# Patient Record
Sex: FEMALE | Race: WHITE | Employment: OTHER | URBAN - METROPOLITAN AREA
[De-identification: names, ages, dates, MRNs, and addresses within clinical notes are randomized per-mention and may not be internally consistent; named-entity substitution may affect disease eponyms.]

---

## 2018-09-11 ENCOUNTER — OFFICE VISIT (OUTPATIENT)
Dept: FAMILY MEDICINE CLINIC | Age: 68
End: 2018-09-11

## 2018-09-11 VITALS
BODY MASS INDEX: 28.94 KG/M2 | HEART RATE: 66 BPM | DIASTOLIC BLOOD PRESSURE: 84 MMHG | SYSTOLIC BLOOD PRESSURE: 128 MMHG | HEIGHT: 61 IN | WEIGHT: 153.3 LBS | TEMPERATURE: 98.4 F | RESPIRATION RATE: 18 BRPM | OXYGEN SATURATION: 96 %

## 2018-09-11 DIAGNOSIS — G47.00 INSOMNIA, UNSPECIFIED TYPE: Primary | ICD-10-CM

## 2018-09-11 RX ORDER — ZOLPIDEM TARTRATE 10 MG/1
10 TABLET ORAL
Qty: 30 TAB | Refills: 2 | Status: SHIPPED | OUTPATIENT
Start: 2018-09-11 | End: 2018-09-11 | Stop reason: SDUPTHER

## 2018-09-11 RX ORDER — ZOLPIDEM TARTRATE 10 MG/1
10 TABLET ORAL
Qty: 30 TAB | Refills: 2 | Status: SHIPPED | OUTPATIENT
Start: 2018-09-11 | End: 2018-09-11 | Stop reason: ALTCHOICE

## 2018-09-11 RX ORDER — TOPIRAMATE 200 MG/1
TABLET ORAL
COMMUNITY
Start: 2017-09-14 | End: 2018-09-28

## 2018-09-11 RX ORDER — RIZATRIPTAN BENZOATE 5 MG/1
TABLET, ORALLY DISINTEGRATING ORAL
COMMUNITY
Start: 2018-06-15 | End: 2019-06-05 | Stop reason: ALTCHOICE

## 2018-09-11 RX ORDER — ZOLPIDEM TARTRATE 10 MG/1
TABLET ORAL
COMMUNITY
Start: 2018-03-10 | End: 2018-09-11 | Stop reason: SDUPTHER

## 2018-09-11 RX ORDER — ZOLPIDEM TARTRATE 10 MG/1
10 TABLET ORAL
Qty: 30 TAB | Refills: 2 | Status: SHIPPED | OUTPATIENT
Start: 2018-09-11 | End: 2018-12-06 | Stop reason: SDUPTHER

## 2018-09-11 NOTE — MR AVS SNAPSHOT
[address] 
[phone] Patient: Twyla Ibarra MRN: [id number] :1950 Visit Information Date & Time Provider Department Dept. Phone Encounter #  
 2018  9:00 AM Braydon Parmar MD [facility] 144 [phone] [id number] Follow-up Instructions Return in about 3 months (around 2018). Your Appointments 2018 11:20 AM  
New Patient with Lexx Cat MD  
[address] [facility] ([facility]) Appt Note: NP; ref heidy Chan for juan pablo; [employer]; per pt no ref req [address] [facility] 99 [phone] 9191 [facility] [id number] Upcoming Health Maintenance Date Due Hepatitis C Screening 1950 DTaP/Tdap/Td series (1 - Tdap) 3/6/1969 ZOSTER VACCINE AGE 60> 2008 FOBT Q 1 YEAR AGE 50-75 10/12/2012 GLAUCOMA SCREENING Q2Y 3/6/2013 Bone Densitometry (Dexa) Screening 3/6/2013 Pneumococcal 65+ High/Highest Risk (2 of 2 - PPSV23) 2015 BREAST CANCER SCRN MAMMOGRAM 6/15/2018 Influenza Age 5 to Adult 2018 MEDICARE YEARLY EXAM 2018 Allergies as of 2018  Review Complete On: 2018 By: Nancy Garcia LPN Severity Noted Reaction Type Reactions Macrodantin [Nitrofurantoin Macrocrystalline]  2012    Other (comments) Low WBC Nitrofurantoin  10/03/2017    Unable to Obtain Nsaids (Non-steroidal Anti-inflammatory Drug)  2014    Other (comments) GI bleed. Other Plant, Animal, Environmental  2014    Other (comments) Mold, dust, cats, dog allergies. Tolmetin  10/03/2017    Unknown (comments) Toradol [Ketorolac Tromethamine]  2012    Rash Current Immunizations  Reviewed on 6/10/2012 Name Date  Influenza Vaccine Split 10/14/2011  4:26 PM  
 Pneumococcal Conjugate (PCV-13) 10/12/2015 Not reviewed this visit You Were Diagnosed With   
  
 Codes Comments Insomnia, unspecified type    -  Primary ICD-10-CM: G47.00 ICD-9-CM: 780.52 Vitals BP Pulse Temp Resp Height(growth percentile) Weight(growth percentile) 128/84 (BP 1 Location: Left arm, BP Patient Position: Sitting) 66 98.4 °F (36.9 °C) (Oral) 18 5' 1\" (1.549 m) 153 lb 4.8 oz (69.5 kg) SpO2 BMI OB Status Smoking Status 96% 28.97 kg/m2 Hysterectomy Never Smoker Vitals History BMI and BSA Data Body Mass Index Body Surface Area  
 28.97 kg/m 2 1.73 m 2 Preferred Pharmacy Pharmacy Name Phone [address], [place] [address] AT [facility] OF  [facility] [phone] Your Updated Medication List  
  
   
This list is accurate as of 9/11/18 10:41 AM.  Always use your most recent med list.  
  
  
  
  
 bisacodyl 5 mg Tab Take 10 mg by mouth as needed. CO Q-10 200 mg capsule Generic drug:  coenzyme Q-10 Take 200 mg by mouth daily. ezetimibe 10 mg tablet Commonly known as:  Narciso Kathleen Take 1 Tab by mouth daily. FLAXSEED OIL PO Take 2,600 mg by mouth daily. gemfibrozil 600 mg tablet Commonly known as:  LOPID Take 600 mg by mouth two (2) times a day. krill oil 500 mg Cap Take 500 mg by mouth daily. metoprolol tartrate 25 mg tablet Commonly known as:  LOPRESSOR Take 25 mg by mouth daily. MULTIVITAMIN PO Take  by mouth. Takes one po daily PRILOSEC OTC PO Take  by mouth as needed. PROBIOTIC PO Take  by mouth. Takes one po daily. rizatriptan 5 mg rapid dissolve tablet Commonly known as:  MAXALT-MLT  
DIS 1 T ON THE TONGUE 1 TIME PRF MIGRAINE HA  1 T QD PRN MIGRAINE  
  
 topiramate 200 mg tablet Commonly known as:  TOPAMAX TK 1 T PO BID ULTRAM 50 mg tablet Generic drug:  traMADol Take 50 mg by mouth two (2) times a day. 2 times daily and as needed 4 hrs later. zolpidem 10 mg tablet Commonly known as:  AMBIEN Take 1 Tab by mouth nightly. Max Daily Amount: 10 mg.  
  
 ZyrTEC 10 mg tablet Generic drug:  cetirizine Take 10 mg by mouth daily. Prescriptions Printed Refills  
 zolpidem (AMBIEN) 10 mg tablet 2 Sig: Take 1 Tab by mouth nightly. Max Daily Amount: 10 mg.  
 Class: Print Route: Oral  
  
Follow-up Instructions Return in about 3 months (around 12/11/2018). Patient Instructions Insomnia: Care Instructions Your Care Instructions Insomnia is the inability to sleep well. It is a common problem for most people at some time. Insomnia may make it hard for you to get to sleep, stay asleep, or sleep as long as you need to. This can make you tired and grouchy during the day. It can also make you forgetful, less effective at work, and unhappy. Insomnia can be caused by conditions such as depression or anxiety. Pain can also affect your ability to sleep. When these problems are solved, the insomnia usually clears up. But sometimes bad sleep habits can cause insomnia. If insomnia is affecting your work or your enjoyment of life, you can take steps to improve your sleep. Follow-up care is a key part of your treatment and safety. Be sure to make and go to all appointments, and call your doctor if you are having problems. It's also a good idea to know your test results and keep a list of the medicines you take. How can you care for yourself at home? What to avoid · Do not have drinks with caffeine, such as coffee or black tea, for 8 hours before bed. · Do not smoke or use other types of tobacco near bedtime. Nicotine is a stimulant and can keep you awake. · Avoid drinking alcohol late in the evening, because it can cause you to wake in the middle of the night. · Do not eat a big meal close to bedtime. If you are hungry, eat a light snack. · Do not drink a lot of water close to bedtime, because the need to urinate may wake you up during the night. · Do not read or watch TV in bed. Use the bed only for sleeping and sexual activity. What to try · Go to bed at the same time every night, and wake up at the same time every morning. Do not take naps during the day. · Keep your bedroom quiet, dark, and cool. · Sleep on a comfortable pillow and mattress. · If watching the clock makes you anxious, turn it facing away from you so you cannot see the time. · If you worry when you lie down, start a worry book. Well before bedtime, write down your worries, and then set the book and your concerns aside. · Try meditation or other relaxation techniques before you go to bed. · If you cannot fall asleep, get up and go to another room until you feel sleepy. Do something relaxing. Repeat your bedtime routine before you go to bed again. · Make your house quiet and calm about an hour before bedtime. Turn down the lights, turn off the TV, log off the computer, and turn down the volume on music. This can help you relax after a busy day. When should you call for help? Watch closely for changes in your health, and be sure to contact your doctor if: 
  · Your efforts to improve your sleep do not work.  
  · Your insomnia gets worse.  
  · You have been feeling down, depressed, or hopeless or have lost interest in things that you usually enjoy. Where can you learn more? Go to http://jeniffer-rogelio.info/. Enter P513 in the search box to learn more about \"Insomnia: Care Instructions. \" Current as of: October 10, 2017 Content Version: 11.7 © 1338-1523 [employer]. Care instructions adapted under license by [employer] (which disclaims liability or warranty for this information).  If you have questions about a medical condition or this instruction, always ask your healthcare professional. [address] any warranty or liability for your use of this information. Insomnia: Care Instructions Your Care Instructions Insomnia is the inability to sleep well. It is a common problem for most people at some time. Insomnia may make it hard for you to get to sleep, stay asleep, or sleep as long as you need to. This can make you tired and grouchy during the day. It can also make you forgetful, less effective at work, and unhappy. Insomnia can be caused by conditions such as depression or anxiety. Pain can also affect your ability to sleep. When these problems are solved, the insomnia usually clears up. But sometimes bad sleep habits can cause insomnia. If insomnia is affecting your work or your enjoyment of life, you can take steps to improve your sleep. Follow-up care is a key part of your treatment and safety. Be sure to make and go to all appointments, and call your doctor if you are having problems. It's also a good idea to know your test results and keep a list of the medicines you take. How can you care for yourself at home? What to avoid · Do not have drinks with caffeine, such as coffee or black tea, for 8 hours before bed. · Do not smoke or use other types of tobacco near bedtime. Nicotine is a stimulant and can keep you awake. · Avoid drinking alcohol late in the evening, because it can cause you to wake in the middle of the night. · Do not eat a big meal close to bedtime. If you are hungry, eat a light snack. · Do not drink a lot of water close to bedtime, because the need to urinate may wake you up during the night. · Do not read or watch TV in bed. Use the bed only for sleeping and sexual activity. What to try · Go to bed at the same time every night, and wake up at the same time every morning. Do not take naps during the day. · Keep your bedroom quiet, dark, and cool. · Sleep on a comfortable pillow and mattress. · If watching the clock makes you anxious, turn it facing away from you so you cannot see the time. · If you worry when you lie down, start a worry book. Well before bedtime, write down your worries, and then set the book and your concerns aside. · Try meditation or other relaxation techniques before you go to bed. · If you cannot fall asleep, get up and go to another room until you feel sleepy. Do something relaxing. Repeat your bedtime routine before you go to bed again. · Make your house quiet and calm about an hour before bedtime. Turn down the lights, turn off the TV, log off the computer, and turn down the volume on music. This can help you relax after a busy day. When should you call for help? Watch closely for changes in your health, and be sure to contact your doctor if: 
  · Your efforts to improve your sleep do not work.  
  · Your insomnia gets worse.  
  · You have been feeling down, depressed, or hopeless or have lost interest in things that you usually enjoy. Where can you learn more? Go to http://jeniffer-rogelio.info/. Enter P513 in the search box to learn more about \"Insomnia: Care Instructions. \" Current as of: October 10, 2017 Content Version: 11.7 © 8288-2000 [employer]. Care instructions adapted under license by [employer] (which disclaims liability or warranty for this information). If you have questions about a medical condition or this instruction, always ask your healthcare professional. [place] [address] any warranty or liability for your use of this information. Introducing [facility]! [facility] introduces Twinklr patient portal. Now you can access parts of your medical record, email your doctor's office, and request medication refills online. 1. In your internet browser, go to [email or web address] 2. Click on the First Time User? Click Here link in the Sign In box. You will see the New Member Sign Up page. 3. Enter your [employer] Access Code exactly as it appears below. You will not need to use this code after youve completed the sign-up process. If you do not sign up before the expiration date, you must request a new code. · [employer] Access Code: [email or web address] Expires: 12/10/2018  9:05 AM 
 
4. Enter the last four digits of your Social Security Number (xxxx) and Date of Birth (mm/dd/yyyy) as indicated and click Submit. You will be taken to the next sign-up page. 5. Create a [employer] ID. This will be your [employer] login ID and cannot be changed, so think of one that is secure and easy to remember. 6. Create a [employer] password. You can change your password at any time. 7. Enter your Password Reset Question and Answer. This can be used at a later time if you forget your password. 8. Enter your e-mail address. You will receive e-mail notification when new information is available in [address] 9. Click Sign Up. You can now view and download portions of your medical record. 10. Click the Download Summary menu link to download a portable copy of your medical information. If you have questions, please visit the Frequently Asked Questions section of the [employer] website. Remember, [employer] is NOT to be used for urgent needs. For medical emergencies, dial 911. Now available from your iPhone and Android! Please provide this summary of care documentation to your next provider. Your primary care clinician is listed as PROVIDER UNKNOWN. If you have any questions after today's visit, please call [phone].

## 2018-09-11 NOTE — PATIENT INSTRUCTIONS

## 2018-09-11 NOTE — PROGRESS NOTES
[facility]  Office [phone], Fax [phone] Subjective:  
 
CC: medication refill History provided by patient HPI: 
Twyla Ibarra is a 77 y.o. WHITE OR  female with history of Insmonia presenting for medication refill Insomnia : Patient reports that she has been taking Ambien 10 mg PO daily approx for the past 3 years. 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 States that she had seen psychiatrist and psychologist in the past and has been on different medication. Also reports chronic pain but had stopped taking Tramadol \" weeks\" ago. 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 Patient reports she has good family support and she is very 'happy person. Currently taking care of her  who was diagnosed with cancer. Patient reports that Ambien 10 mg helps her to get 8 hours of sleep at night time. Denies dizziness, weakness or LOC. Last dose of Ambien is last night. Review of Systems Constitutional: Negative for chills and fever. HENT: Negative for sore throat. Eyes: Negative for blurred vision and double vision. Respiratory: Negative for shortness of breath. Cardiovascular: Negative for chest pain and palpitations. Gastrointestinal: Negative for abdominal pain, nausea and vomiting. Musculoskeletal: Positive for joint pain. Negative for falls. Neurological: Positive for headaches. Negative for focal weakness, seizures and loss of consciousness. Psychiatric/Behavioral: Negative for depression and suicidal ideas. The patient has insomnia. Past Medical History:  
Diagnosis Date  Aneurysm ([address]  Arthritis   
 back  Chronic kidney disease BUN/creatinine elevated - being evaluated/may be due to diovan  Chronic pain   
 back  Chronic renal failure  Hyperlipidemia  Hypertension  Lumbar stenosis  Migraine  Other ill-defined conditions(799.89)   
 wheezing with allergies  Other ill-defined conditions(799.89) GI bleed - stomach - NSAID use  Overactive bladder  PUD (peptic ulcer disease)  Stenosis of lumbosacral spine  Thromboembolus ([facility] [address] PE  
 
 
Current Outpatient Prescriptions on File Prior to Visit Medication Sig Dispense Refill  metoprolol (LOPRESSOR) 25 mg tablet Take 25 mg by mouth daily.  coenzyme Q-10 (CO Q-10) 200 mg capsule Take 200 mg by mouth daily.  LACTOBACILLUS ACIDOPHILUS (PROBIOTIC PO) Take  by mouth. Takes one po daily.  MULTIVITAMIN PO Take  by mouth. Takes one po daily  krill oil 500 mg cap Take 500 mg by mouth daily.  cetirizine (ZYRTEC) 10 mg tablet Take 10 mg by mouth daily.  ezetimibe (ZETIA) 10 mg tablet Take 1 Tab by mouth daily. 30 Tab 0  
 gemfibrozil (LOPID) 600 mg tablet Take 600 mg by mouth two (2) times a day.  OMEPRAZOLE MAGNESIUM (PRILOSEC OTC PO) Take  by mouth as needed.  FLAXSEED OIL PO Take 2,600 mg by mouth daily.  bisacodyl 5 mg tab Take 10 mg by mouth as needed.  traMADol (ULTRAM) 50 mg tablet Take 50 mg by mouth two (2) times a day. 2 times daily and as needed 4 hrs later. No current facility-administered medications on file prior to visit. Allergies Allergen Reactions  Macrodantin [Nitrofurantoin Macrocrystalline] Other (comments) Low WBC  Nitrofurantoin Unable to Obtain  Nsaids (Non-Steroidal Anti-Inflammatory Drug) Other (comments) GI bleed.  Other Plant, Animal, Environmental Other (comments) Mold, dust, cats, dog allergies.  Tolmetin Unknown (comments)  Toradol [Ketorolac Tromethamine] Rash Past Surgical History:  
Procedure Laterality Date  HX HYSTERECTOMY  HX LUMBAR LAMINECTOMY  HX ORTHOPAEDIC  2009  
 spinal fusion/has not had a lumbar laminectomy  HX ORTHOPAEDIC Bilateral   
 bunionectomy  HX ORTHOPAEDIC Bilateral   
 carpal tunnel release  HX TONSILLECTOMY T & A  
 HX UROLOGICAL  2012  
 bladder repair Social History Social History  Marital status:  Spouse name: N/A  
 Number of children: N/A  
 Years of education: N/A Occupational History  Not on file. Social History Main Topics  Smoking status: Never Smoker  Smokeless tobacco: Never Used  Alcohol use No  
 Drug use: No  
 Sexual activity: Yes  
  Partners: Male Other Topics Concern  Not on file Social History Narrative Patient Active Problem List  
Diagnosis Code  Hypertension I10  
 Hyperlipidemia E78.5  Chronic pain G89.29  
 Leukocytosis, unspecified D72.829  
 Anemia, unspecified D64.9  Dehydration E86.0  Renal failure, acute (HCC) N17.9  Altered mental status R41.82  Stenosis of lumbosacral spine M48.07  
 Hyponatremia E87.1  Hyperkalemia E87.5  Acidosis E87.2  Metabolic encephalopathy P07.85 Objective:  
Vitals - reviewed Visit Vitals  /84 (BP 1 Location: Left arm, BP Patient Position: Sitting)  Pulse 66  Temp 98.4 °F (36.9 °C) (Oral)  Resp 18  Ht 5' 1\" (1.549 m)  Wt 153 lb 4.8 oz (69.5 kg)  SpO2 96%  BMI 28.97 kg/m2 Physical Exam  
Constitutional: She is oriented to person, place, and time. She appears well-developed and well-nourished. HENT:  
Head: Normocephalic and atraumatic. Eyes: Conjunctivae and EOM are normal. Pupils are equal, round, and reactive to light. Neck: Normal range of motion. Neck supple. Cardiovascular: Normal rate, regular rhythm and normal heart sounds. Pulmonary/Chest: Effort normal and breath sounds normal.  
Abdominal: Soft. Bowel sounds are normal.  
Musculoskeletal: Normal range of motion. Neurological: She is alert and oriented to person, place, and time. Psychiatric: She has a normal mood and affect.  Her behavior is normal.  
 
 
 
 
 
 Assessment and orders:  
Diagnoses and all orders for this visit: 
 
1. Insomnia, unspecified type:  reviewed: last refill for Ambien on 08/06 consistent with last visit here at [facility]. UDS on 09/04 positive for Zolpidem, and Topamax ( for headache) otherwise unremarkable. Tramadol not detected. Discussed the need to be tapered off Ambien and discussed the reduction of dose to 5 mg based on guidelines. Patient aware and stated that only the 10 mg Zolpidem works for insomnia. Had tried different medications including Melatonin and Trazodone in the past without any significant help. No signs of Depression. Declined to see Psychiatrist. Has an appointment with sleep medicine next month. -     zolpidem (AMBIEN) 10 mg tablet; Take 1 Tab by mouth nightly. Max Daily Amount: 10 mg. 
-     Consider weaning of Ambient to 5 mg at next visit. Pt was discussed with Dr. Dwayne Edwards (attending physician). I have reviewed patient medical and social history and medications. I have reviewed pertinent labs results and other data. I have discussed the diagnosis with the patient and the intended plan as seen in the above orders. The patient has received an after-visit summary and questions were answered concerning future plans. I have discussed medication side effects and warnings with the patient as well. Ruiz Vergara MD 
Resident [facility] 09/12/18

## 2018-09-25 RX ORDER — METOPROLOL TARTRATE 25 MG/1
25 TABLET, FILM COATED ORAL 2 TIMES DAILY
Qty: 180 TAB | Refills: 1 | Status: SHIPPED | OUTPATIENT
Start: 2018-09-25 | End: 2019-03-27 | Stop reason: SDUPTHER

## 2018-09-28 RX ORDER — GABAPENTIN 300 MG/1
300 CAPSULE ORAL AS NEEDED
COMMUNITY
End: 2019-09-05

## 2018-09-28 RX ORDER — TOPIRAMATE 200 MG/1
200 TABLET ORAL 2 TIMES DAILY
COMMUNITY
End: 2022-01-28 | Stop reason: ALTCHOICE

## 2018-09-28 RX ORDER — TOLMETIN SODIUM 400 MG
400 CAPSULE ORAL 3 TIMES DAILY
COMMUNITY
End: 2018-09-28

## 2018-09-28 RX ORDER — CHOLECALCIFEROL (VITAMIN D3) 125 MCG
2000 CAPSULE ORAL DAILY
COMMUNITY

## 2018-09-29 ENCOUNTER — ANESTHESIA EVENT (OUTPATIENT)
Dept: ENDOSCOPY | Age: 68
End: 2018-09-29
Payer: MEDICARE

## 2018-09-29 NOTE — ANESTHESIA PREPROCEDURE EVALUATION
Anesthetic History No history of anesthetic complications Review of Systems / Medical History Patient summary reviewed, nursing notes reviewed and pertinent labs reviewed Pulmonary Asthma : well controlled Comments: Hx PE Neuro/Psych Headaches Cardiovascular Hypertension Hyperlipidemia GI/Hepatic/Renal 
  
 
 
Renal disease: CRI Endo/Other Arthritis and anemia Other Findings Physical Exam 
 
Airway Mallampati: II 
TM Distance: > 6 cm Neck ROM: normal range of motion Mouth opening: Normal 
 
 Cardiovascular Regular rate and rhythm,  S1 and S2 normal,  no murmur, click, rub, or gallop Dental 
 
Dentition: Caps/crowns Pulmonary Breath sounds clear to auscultation Abdominal 
GI exam deferred Other Findings Anesthetic Plan ASA: 3 Anesthesia type: MAC Induction: Intravenous Anesthetic plan and risks discussed with: Patient

## 2018-10-01 ENCOUNTER — HOSPITAL ENCOUNTER (OUTPATIENT)
Age: 68
Setting detail: OUTPATIENT SURGERY
Discharge: HOME | End: 2018-10-01
Attending: SPECIALIST | Admitting: SPECIALIST
Payer: MEDICARE

## 2018-10-01 ENCOUNTER — ANESTHESIA (OUTPATIENT)
Dept: ENDOSCOPY | Age: 68
End: 2018-10-01
Payer: MEDICARE

## 2018-10-01 VITALS
HEIGHT: 61 IN | DIASTOLIC BLOOD PRESSURE: 58 MMHG | BODY MASS INDEX: 28.89 KG/M2 | RESPIRATION RATE: 18 BRPM | OXYGEN SATURATION: 98 % | WEIGHT: 153 LBS | HEART RATE: 58 BPM | TEMPERATURE: 97.9 F | SYSTOLIC BLOOD PRESSURE: 97 MMHG

## 2018-10-01 PROCEDURE — 76060000032 HC ANESTHESIA 0.5 TO 1 HR: Performed by: SPECIALIST

## 2018-10-01 PROCEDURE — 77030027957 HC TBNG IRR ENDOGTR BUSS -B: Performed by: SPECIALIST

## 2018-10-01 PROCEDURE — 76040000007: Performed by: SPECIALIST

## 2018-10-01 PROCEDURE — 74011250636 HC RX REV CODE- 250/636

## 2018-10-01 RX ORDER — SODIUM CHLORIDE 9 MG/ML
INJECTION, SOLUTION INTRAVENOUS
Status: DISCONTINUED | OUTPATIENT
Start: 2018-10-01 | End: 2018-10-01 | Stop reason: HOSPADM

## 2018-10-01 RX ORDER — SODIUM CHLORIDE 0.9 % (FLUSH) 0.9 %
5-10 SYRINGE (ML) INJECTION EVERY 8 HOURS
Status: DISCONTINUED | OUTPATIENT
Start: 2018-10-01 | End: 2018-10-01 | Stop reason: HOSPADM

## 2018-10-01 RX ORDER — SODIUM CHLORIDE 9 MG/ML
50 INJECTION, SOLUTION INTRAVENOUS CONTINUOUS
Status: DISCONTINUED | OUTPATIENT
Start: 2018-10-01 | End: 2018-10-01 | Stop reason: HOSPADM

## 2018-10-01 RX ORDER — LIDOCAINE HYDROCHLORIDE 20 MG/ML
INJECTION, SOLUTION EPIDURAL; INFILTRATION; INTRACAUDAL; PERINEURAL AS NEEDED
Status: DISCONTINUED | OUTPATIENT
Start: 2018-10-01 | End: 2018-10-01 | Stop reason: HOSPADM

## 2018-10-01 RX ORDER — SODIUM CHLORIDE 0.9 % (FLUSH) 0.9 %
5-10 SYRINGE (ML) INJECTION AS NEEDED
Status: DISCONTINUED | OUTPATIENT
Start: 2018-10-01 | End: 2018-10-01 | Stop reason: HOSPADM

## 2018-10-01 RX ORDER — EPINEPHRINE 0.1 MG/ML
1 INJECTION INTRACARDIAC; INTRAVENOUS
Status: DISCONTINUED | OUTPATIENT
Start: 2018-10-01 | End: 2018-10-01 | Stop reason: HOSPADM

## 2018-10-01 RX ORDER — LIDOCAINE HYDROCHLORIDE 20 MG/ML
INJECTION, SOLUTION EPIDURAL; INFILTRATION; INTRACAUDAL; PERINEURAL AS NEEDED
Status: DISCONTINUED | OUTPATIENT
Start: 2018-10-01 | End: 2018-10-01

## 2018-10-01 RX ORDER — DEXTROMETHORPHAN/PSEUDOEPHED 2.5-7.5/.8
1.2 DROPS ORAL
Status: DISCONTINUED | OUTPATIENT
Start: 2018-10-01 | End: 2018-10-01 | Stop reason: HOSPADM

## 2018-10-01 RX ORDER — ATROPINE SULFATE 0.1 MG/ML
0.5 INJECTION INTRAVENOUS
Status: DISCONTINUED | OUTPATIENT
Start: 2018-10-01 | End: 2018-10-01 | Stop reason: HOSPADM

## 2018-10-01 RX ORDER — PROPOFOL 10 MG/ML
INJECTION, EMULSION INTRAVENOUS AS NEEDED
Status: DISCONTINUED | OUTPATIENT
Start: 2018-10-01 | End: 2018-10-01 | Stop reason: HOSPADM

## 2018-10-01 RX ORDER — FENTANYL CITRATE 50 UG/ML
200 INJECTION, SOLUTION INTRAMUSCULAR; INTRAVENOUS
Status: DISCONTINUED | OUTPATIENT
Start: 2018-10-01 | End: 2018-10-01 | Stop reason: HOSPADM

## 2018-10-01 RX ORDER — FLUMAZENIL 0.1 MG/ML
0.2 INJECTION INTRAVENOUS
Status: DISCONTINUED | OUTPATIENT
Start: 2018-10-01 | End: 2018-10-01 | Stop reason: HOSPADM

## 2018-10-01 RX ORDER — LORAZEPAM 2 MG/ML
2 INJECTION INTRAMUSCULAR AS NEEDED
Status: DISCONTINUED | OUTPATIENT
Start: 2018-10-01 | End: 2018-10-01 | Stop reason: HOSPADM

## 2018-10-01 RX ORDER — NALOXONE HYDROCHLORIDE 0.4 MG/ML
0.4 INJECTION, SOLUTION INTRAMUSCULAR; INTRAVENOUS; SUBCUTANEOUS
Status: DISCONTINUED | OUTPATIENT
Start: 2018-10-01 | End: 2018-10-01 | Stop reason: HOSPADM

## 2018-10-01 RX ORDER — MIDAZOLAM HYDROCHLORIDE 1 MG/ML
.25-1 INJECTION, SOLUTION INTRAMUSCULAR; INTRAVENOUS
Status: DISCONTINUED | OUTPATIENT
Start: 2018-10-01 | End: 2018-10-01 | Stop reason: HOSPADM

## 2018-10-01 RX ADMIN — LIDOCAINE HYDROCHLORIDE 40 MG: 20 INJECTION, SOLUTION EPIDURAL; INFILTRATION; INTRACAUDAL; PERINEURAL at 10:26

## 2018-10-01 RX ADMIN — SODIUM CHLORIDE: 9 INJECTION, SOLUTION INTRAVENOUS at 10:21

## 2018-10-01 RX ADMIN — PROPOFOL 100 MG: 10 INJECTION, EMULSION INTRAVENOUS at 10:37

## 2018-10-01 RX ADMIN — PROPOFOL 100 MG: 10 INJECTION, EMULSION INTRAVENOUS at 10:26

## 2018-10-01 NOTE — ROUTINE PROCESS
Nicholas Phelps  1950  [id number]    Situation:  Verbal report received from: Naseem Johansen RN  Procedure: Procedure(s):  COLONOSCOPY    Background:    Preoperative diagnosis: ALTERED BOWEL FUNCTION/BLOATING SYMPTOMS/PAINFUL RECTAL BLEED  Postoperative diagnosis: 1.- Constipation    :  Dr. Josiane Allison  Assistant(s): Endoscopy Technician-1: Mary Alice Ovalle  Endoscopy RN-1: Anatoliy Peña RN    Specimens: * No specimens in log *  H. Pylori  no    Assessment:  Intra-procedure medications   Anesthesia gave intra-procedure sedation and medications, see anesthesia flow sheet yes    Intravenous fluids: NS@ KVO     Vital signs stable     Abdominal assessment: round and soft     Recommendation:  Discharge patient per MD order.   Family or Friend   Permission to share finding with family or friend yes

## 2018-10-01 NOTE — PROGRESS NOTES

## 2018-10-01 NOTE — H&P
Pre-endoscopy H and P for Colonoscopy    The patient was seen and examined. Date of last colonoscopy: 2014, Polyps  No      The airway was assessed and documented. The problem list, past medical history, and medications were reviewed. Patient Active Problem List   Diagnosis Code    Hypertension I10    Hyperlipidemia E78.5    Chronic pain G89.29    Leukocytosis, unspecified D72.829    Anemia, unspecified D64.9    Dehydration E86.0    Renal failure, acute (HCC) N17.9    Altered mental status R41.82    Stenosis of lumbosacral spine M48.07    Hyponatremia E87.1    Hyperkalemia E87.5    Acidosis I03.3    Metabolic encephalopathy N00.56     Social History     Social History    Marital status:      Spouse name: N/A    Number of children: N/A    Years of education: N/A     Occupational History    Not on file.      Social History Main Topics    Smoking status: Never Smoker    Smokeless tobacco: Never Used    Alcohol use Yes      Comment: very, very rare    Drug use: No    Sexual activity: Yes     Partners: Male     Other Topics Concern    Not on file     Social History Narrative     Past Medical History:   Diagnosis Date    Aneurysm ([facility] [address]     splenic artery aneurysm - no longer needs to be followed up - will never be a problem    Arthritis     back    Chronic kidney disease     BUN/creatinine elevated - being evaluated/may be due to diovan - occasional BUN and creatinine elevated but is mostly normal    Chronic pain     back    Chronic renal failure     Hyperlipidemia     Hypertension     Ill-defined condition     walks with a cane    Ill-defined condition     cardiac calcium test - some build up/stress test is \"fine\" per pt    Lumbar stenosis     Migraine     Other ill-defined conditions(799.89)     wheezing with allergies    Other ill-defined conditions(799.89)     GI bleed - stomach - NSAID use    Overactive bladder     PUD (peptic ulcer disease)     Stenosis of lumbosacral spine     Thromboembolus [facility])     PE     The patient has a family history of colon ca    Prior to Admission Medications   Prescriptions Last Dose Informant Patient Reported? Taking? LACTOBACILLUS ACIDOPHILUS (PROBIOTIC PO)   Yes Yes   Sig: Take  by mouth. Takes one po daily. MULTIVITAMIN PO   Yes Yes   Sig: Take  by mouth. Takes one po daily   acetaminophen (TYLENOL EXTRA STRENGTH PO)   Yes Yes   Sig: Take  by mouth. Takes 2 po as needed for leg or back pain. cetirizine (ZYRTEC) 10 mg tablet   Yes Yes   Sig: Take 10 mg by mouth daily. cholecalciferol, vitamin D3, 2,000 unit tab   Yes Yes   Sig: Take 2,000 Units by mouth daily. ezetimibe (ZETIA) 10 mg tablet   No Yes   Sig: Take 1 Tab by mouth daily. gabapentin (NEURONTIN) 300 mg capsule   Yes Yes   Sig: Take 300 mg by mouth nightly. gemfibrozil (LOPID) 600 mg tablet   Yes Yes   Sig: Take 600 mg by mouth two (2) times a day. metoprolol tartrate (LOPRESSOR) 25 mg tablet   No Yes   Sig: Take 1 Tab by mouth two (2) times a day. rizatriptan (MAXALT-MLT) 5 mg rapid dissolve tablet   Yes Yes   Sig: DIS 1 T ON THE TONGUE 1 TIME PRF MIGRAINE HA  1 T QD PRN MIGRAINE   topiramate (TOPAMAX) 200 mg tablet   Yes Yes   Sig: Take 200 mg by mouth daily. traMADol (ULTRAM) 50 mg tablet   Yes Yes   Sig: Take 50 mg by mouth daily as needed for Pain (back pain). turmeric (CURCUMIN)   Yes Yes   Sig: Take 600 mg by mouth daily. zolpidem (AMBIEN) 10 mg tablet   No Yes   Sig: Take 1 Tab by mouth nightly. Max Daily Amount: 10 mg. Facility-Administered Medications: None         The review of systems is:  negative for shortness of breath or chest pain      The heart, lungs and mental status were satisfactory for the administration of MAC sedation and for the procedure. Mallampati score: See Anesthesia. I discussed with the patient the objectives, risks, consequences and alternatives to the procedure.       Plan: Endoscopic procedure with MAC sedation.     Jt Landers MD  10/1/2018  8:16 AM

## 2018-10-01 NOTE — IP AVS SNAPSHOT
Summary of Care Report The Summary of Care report has been created to help improve care coordination. Users with access to [employer] or [address] (Web-based application) may access additional patient information including the Discharge Summary. If you are not currently a [address] user and need more information, please call the number listed below in the [place] [address] section and ask to be connected with Medical Records. Facility Information Name Address Phone [address] [facility] [address] [id number] [phone] Patient Information Patient Name Sex JEFF Aguilar Ask ([id number]) Female 1950 Discharge Information Admitting Provider Service Area Unit Ashutosh Rudolph MD /  [facility] [phone] Discharge Provider Discharge Date/Time Discharge Disposition Destination (none) (none) (none) (none) Patient Language Language ENGLISH [13] Hospital Problems as of 10/1/2018  Reviewed: 10/11/2011  7:35 PM by Jadon Figueroa MD  
 None Non-Hospital Problems as of 10/1/2018  Reviewed: 10/11/2011  7:35 PM by Jadon Figueroa MD  
  
  
  
 Class Noted - Resolved Last Modified Active Problems Hypertension  Unknown - Present 2012 Entered by Jadon Figueroa MD  
  Hyperlipidemia  Unknown - Present 2012 Entered by Jadon Figueroa MD  
  Chronic pain  Unknown - Present 2012 Entered by Jadon Figueroa MD  
  Leukocytosis, unspecified  10/11/2011 - Present 2012 Entered by Jadon Figueroa MD  
  Anemia, unspecified  10/11/2011 - Present 2012 Entered by Jadon Figueroa MD  
  Dehydration  10/11/2011 - Present 2012 Entered by Jadon Figueroa MD  
  Renal failure, acute ([facility] [address]  10/11/2011 - Present 2012   Entered by Jadon Figueroa MD  
 Altered mental status  10/11/2011 - Present 6/11/2012 Entered by Jack Naidu MD  
  Stenosis of lumbosacral spine  Unknown - Present 6/11/2012 Entered by Jack Naidu MD  
  Hyponatremia  6/9/2012 - Present 6/11/2012 Entered by Jack Naidu MD  
  Hyperkalemia  6/9/2012 - Present 6/11/2012 Entered by Jack Naidu MD  
  Acidosis  6/9/2012 - Present 6/11/2012 Entered by Jack Naidu MD  
  Metabolic encephalopathy  3/5/1019 - Present 6/11/2012 Entered by Jack Naidu MD  
  
You are allergic to the following Allergen Reactions Macrodantin (Nitrofurantoin Macrocrystalline) Other (comments) Low WBC Nitrofurantoin Other (comments) LOW WBC - 2000 Nsaids (Non-Steroidal Anti-Inflammatory Drug) Other (comments) GI bleed. Other Plant, Animal, Environmental Other (comments) Mold, dust, cats, dog allergies. ENVIRONMENTAL ALLERGIES. Tolmetin Unknown (comments) Toradol (Ketorolac Tromethamine) Rash Current Discharge Medication List  
  
CONTINUE these medications which have NOT CHANGED Dose & Instructions Dispensing Information Comments  
 cholecalciferol (vitamin D3) 2,000 unit Tab Dose:  2000 Units Take 2,000 Units by mouth daily. Refills:  0  
   
 CURCUMIN Dose:  600 mg Take 600 mg by mouth daily. Refills:  0  
   
 ezetimibe 10 mg tablet Commonly known as:  [place] Costain Dose:  10 mg Take 1 Tab by mouth daily. Quantity:  30 Tab Refills:  0  
   
 gabapentin 300 mg capsule Commonly known as:  NEURONTIN Dose:  300 mg Take 300 mg by mouth nightly. Refills:  0  
   
 gemfibrozil 600 mg tablet Commonly known as:  LOPID Dose:  600 mg Take 600 mg by mouth two (2) times a day. Refills:  0  
   
 metoprolol tartrate 25 mg tablet Commonly known as:  LOPRESSOR Dose:  25 mg Take 1 Tab by mouth two (2) times a day. Quantity:  180 Tab Refills:  1 MULTIVITAMIN PO Take  by mouth. Takes one po daily Refills:  0 PROBIOTIC PO Take  by mouth. Takes one po daily. Refills:  0  
   
 rizatriptan 5 mg rapid dissolve tablet Commonly known as:  MAXALT-MLT  
 DIS 1 T ON THE TONGUE 1 TIME PRF MIGRAINE HA  1 T QD PRN MIGRAINE Refills:  0  
   
 TOPAMAX 200 mg tablet Generic drug:  topiramate Dose:  200 mg Take 200 mg by mouth daily. Refills:  0  
   
 TYLENOL EXTRA STRENGTH PO Take  by mouth. Takes 2 po as needed for leg or back pain. Refills:  0  
   
 ULTRAM 50 mg tablet Generic drug:  traMADol Dose:  50 mg Take 50 mg by mouth daily as needed for Pain (back pain). Refills:  0  
   
 zolpidem 10 mg tablet Commonly known as:  AMBIEN Dose:  10 mg Take 1 Tab by mouth nightly. Max Daily Amount: 10 mg.  
 Quantity:  30 Tab Refills:  2 ZyrTEC 10 mg tablet Generic drug:  cetirizine Dose:  10 mg Take 10 mg by mouth daily. Refills:  0 Current Immunizations Name Date Influenza Vaccine Split 10/14/2011 Pneumococcal Conjugate (PCV-13) 10/12/2015 Surgery Information ID Date/Time Status Primary Surgeon All Procedures Location [id number] 10/1/2018 0930 Unposted Tan Rose MD COLONOSCOPY [facility] ENDOSCOPY Follow-up Information None Discharge Instructions [facility] [id number] 
1950 COLON DISCHARGE INSTRUCTIONS Discomfort: 
Redness at IV site- apply warm compress to area; if redness or soreness persist- contact your physician There may be a slight amount of blood passed from the rectum Gaseous discomfort- walking, belching will help relieve any discomfort You may not operate a vehicle for 12 hours You may not engage in an occupation involving machinery or appliances for rest of today You may not drink alcoholic beverages for at least 12 hours Avoid making any critical decisions for at least 24 hour DIET: 
 Regular diet.  however -  remember your colon is empty and a heavy meal will produce gas. Avoid these foods:  vegetables, fried / greasy foods, carbonated drinks for today. ACTIVITY: 
You may resume your normal daily activities it is recommended that you spend the remainder of the day resting -  avoid any strenuous activity. CALL M.D. ANY SIGN OF: Increasing pain, nausea, vomiting Abdominal distension (swelling) New increased bleeding (oral or rectal) Fever (chills) Pain in chest area Bloody discharge from nose or mouth Shortness of breath You may  take any Advil, Aspirin, Ibuprofen, Motrin, Aleve, Goodys, or any similar pain or arthritis products or Tylenol as needed for pain. Follow-up Instructions: 
 Call Dr. Daija Mcclain Office telephone for problems or questions [phone] 
 
  - For colon cancer screening in this average-risk patient, colonoscopy may be repeated in 10 years. - Follow up in office for recurrent bleeding or constipation [employer] Activation Thank you for requesting access to [employer]. Please follow the instructions below to securely access and download your online medical record. [employer] allows you to send messages to your doctor, view your test results, renew your prescriptions, schedule appointments, and more. How Do I Sign Up? 1. In your internet browser, go to [email or web address] 
2. Click on the First Time User? Click Here link in the Sign In box. You will be redirect to the New Member Sign Up page. 3. Enter your [employer] Access Code exactly as it appears below. You will not need to use this code after youve completed the sign-up process. If you do not sign up before the expiration date, you must request a new code. [employer] Access Code: [email or web address] Expires: 12/10/2018  9:05 AM (This is the date your [employer] access code will ) 4.  Enter the last four digits of your Social Security Number (xxxx) and Date of Birth (mm/dd/yyyy) as indicated and click Submit. You will be taken to the next sign-up page. 5. Create a DossierView ID. This will be your [employer] login ID and cannot be changed, so think of one that is secure and easy to remember. 6. Create a [employer] password. You can change your password at any time. 7. Enter your Password Reset Question and Answer. This can be used at a later time if you forget your password. 8. Enter your e-mail address. You will receive e-mail notification when new information is available in [address] 9. Click Sign Up. You can now view and download portions of your medical record. 10. Click the Download Summary menu link to download a portable copy of your medical information. Additional Information If you have questions, please visit the Frequently Asked Questions section of the [employer] website at [email or web address] Remember, [employer] is NOT to be used for urgent needs. For medical emergencies, dial 911. Chart Review Routing History No Routing History on File

## 2018-10-01 NOTE — PROCEDURES
Colonoscopy Procedure Note    Indications:   Constipation, Lower GI bleeding  Referring Physician: PROVIDER UNKNOWN   Anesthesia/Sedation:MAC  Endoscopist:  Dr. Morillo Staff  Assistant:  Endoscopy Technician-1: Aubrie Kennedy  Endoscopy RN-1: Dakota Terrazas RN    Preoperative diagnosis: ALTERED BOWEL FUNCTION/BLOATING SYMPTOMS/PAINFUL RECTAL BLEED    Postoperative diagnosis: 1.- Constipation      Procedure in Detail:  Informed consent was obtained for the procedure, including sedation. Risks of perforation, hemorrhage, adverse drug reaction, and aspiration were discussed. The patient was placed in the left lateral decubitus position. Based on the pre-procedure assessment, including review of the patient's medical history, medications, allergies, and review of systems, she had been deemed to be an appropriate candidate for moderate sedation; she was therefore sedated with the medications listed above. The patient was monitored continuously with ECG tracing, pulse oximetry, blood pressure monitoring, and direct observations. A rectal examination was performed. The [id number] was inserted into the rectum and advanced under direct vision to the terminal ileum. The quality of the colonic preparation was excellent. A careful inspection was made as the colonoscope was withdrawn, including a retroflexed view of the rectum; findings and interventions are described below. Findings:   Rectum: normal, no significant hemorrhoid disease  Sigmoid: normal, mild diverticulosis  Descending Colon: normal  Transverse Colon: normal  Ascending Colon: normal  Cecum: normal  Terminal Ileum: normal    Specimens:     none    EBL: None    Complications: None; patient tolerated the procedure well. Recommendations:     - For colon cancer screening in this average-risk patient, colonoscopy may be repeated in 10 years.      - Follow up in office for recurrent bleeding or constipation    Signed By: Claudio Ramirez MD                        October 1, 2018

## 2018-10-01 NOTE — ANESTHESIA POSTPROCEDURE EVALUATION
Post-Anesthesia Evaluation and Assessment Patient: Nuzhat Lassiter MRN: [id number]  SSN: [id number] YOB: 1950  Age: 77 y.o. Sex: female Cardiovascular Function/Vital Signs Visit Vitals  BP 97/58  Pulse (!) 58  Temp 36.6 °C (97.9 °F)  Resp 18  Ht 5' 1\" (1.549 m)  Wt 69.4 kg (153 lb)  SpO2 98%  Breastfeeding No  
 BMI 28.91 kg/m2 Patient is status post MAC anesthesia for Procedure(s): 
COLONOSCOPY. Nausea/Vomiting: None Postoperative hydration reviewed and adequate. Pain: 
Pain Scale 1: Numeric (0 - 10) (10/01/18 1125) Pain Intensity 1: 0 (10/01/18 1125) Managed Neurological Status: At baseline Mental Status and Level of Consciousness: Arousable Pulmonary Status:  
O2 Device: Room air (10/01/18 1125) Adequate oxygenation and airway patent Complications related to anesthesia: None Post-anesthesia assessment completed. No concerns Signed By: Colleen Perales MD   
 October 1, 2018

## 2018-10-01 NOTE — DISCHARGE INSTRUCTIONS
Gifty Manning  [id number]  1950    COLON DISCHARGE INSTRUCTIONS  Discomfort:  Redness at IV site- apply warm compress to area; if redness or soreness persist- contact your physician  There may be a slight amount of blood passed from the rectum  Gaseous discomfort- walking, belching will help relieve any discomfort  You may not operate a vehicle for 12 hours  You may not engage in an occupation involving machinery or appliances for rest of today  You may not drink alcoholic beverages for at least 12 hours  Avoid making any critical decisions for at least 24 hour  DIET:   Regular diet. - however -  remember your colon is empty and a heavy meal will produce gas. Avoid these foods:  vegetables, fried / greasy foods, carbonated drinks for today. ACTIVITY:  You may resume your normal daily activities it is recommended that you spend the remainder of the day resting -  avoid any strenuous activity. CALL M.D. ANY SIGN OF:  Increasing pain, nausea, vomiting  Abdominal distension (swelling)  New increased bleeding (oral or rectal)  Fever (chills)  Pain in chest area  Bloody discharge from nose or mouth  Shortness of breath    You may  take any Advil, Aspirin, Ibuprofen, Motrin, Aleve, Goodys, or any similar pain or arthritis products or Tylenol as needed for pain. Follow-up Instructions:   Call Dr. Brittney Howard telephone for problems or questions [phone]      - For colon cancer screening in this average-risk patient, colonoscopy may be repeated in 10 years. - Follow up in office for recurrent bleeding or constipation    [employer] Activation    Thank you for requesting access to [employer]. Please follow the instructions below to securely access and download your online medical record. [employer] allows you to send messages to your doctor, view your test results, renew your prescriptions, schedule appointments, and more. How Do I Sign Up? 1.  In your internet browser, go to [email or web address]  2. Click on the First Time User? Click Here link in the Sign In box. You will be redirect to the New Member Sign Up page. 3. Enter your [employer] Access Code exactly as it appears below. You will not need to use this code after youve completed the sign-up process. If you do not sign up before the expiration date, you must request a new code. [employer] Access Code: [email or web address]  Expires: 12/10/2018  9:05 AM (This is the date your [employer] access code will )    4. Enter the last four digits of your Social Security Number (xxxx) and Date of Birth (mm/dd/yyyy) as indicated and click Submit. You will be taken to the next sign-up page. 5. Create a [employer] ID. This will be your [employer] login ID and cannot be changed, so think of one that is secure and easy to remember. 6. Create a [employer] password. You can change your password at any time. 7. Enter your Password Reset Question and Answer. This can be used at a later time if you forget your password. 8. Enter your e-mail address. You will receive e-mail notification when new information is available in [address] 9. Click Sign Up. You can now view and download portions of your medical record. 10. Click the Download Summary menu link to download a portable copy of your medical information. Additional Information    If you have questions, please visit the Frequently Asked Questions section of the [employer] website at [email or web address] Remember, [employer] is NOT to be used for urgent needs. For medical emergencies, dial 911.

## 2018-10-01 NOTE — IP AVS SNAPSHOT
[address] 
[phone] Patient: Carlos Reyna MRN: [id number] :1950 About your hospitalization You were admitted on:  2018 You last received care in the:  [facility] ENDOSCOPY You were discharged on:  2018 Why you were hospitalized Your primary diagnosis was:  Not on File Follow-up Information None Your Scheduled Appointments  11:20 AM EDT New Patient with Citlaly Ignacio MD  
[address] [facility]  
 [address] [place] [address] Carine Joe [id number] [phone] Discharge Orders None A check michell indicates which time of day the medication should be taken. My Medications CONTINUE taking these medications Instructions Each Dose to Equal  
 Morning Noon Evening Bedtime  
 cholecalciferol (vitamin D3) 2,000 unit Tab Your last dose was: Your next dose is: Take 2,000 Units by mouth daily. 2000 Units CURCUMIN Your last dose was: Your next dose is: Take 600 mg by mouth daily. 600 mg  
    
   
   
   
  
 ezetimibe 10 mg tablet Commonly known as:  Milly Bloom Your last dose was: Your next dose is: Take 1 Tab by mouth daily. 10 mg  
    
   
   
   
  
 gabapentin 300 mg capsule Commonly known as:  NEURONTIN Your last dose was: Your next dose is: Take 300 mg by mouth nightly. 300 mg  
    
   
   
   
  
 gemfibrozil 600 mg tablet Commonly known as:  LOPID Your last dose was: Your next dose is: Take 600 mg by mouth two (2) times a day. 600 mg  
    
   
   
   
  
 metoprolol tartrate 25 mg tablet Commonly known as:  LOPRESSOR Your last dose was: Your next dose is: Take 1 Tab by mouth two (2) times a day. 25 mg  
    
   
   
   
  
 MULTIVITAMIN PO Your last dose was: Your next dose is: Take  by mouth. Takes one po daily PROBIOTIC PO Your last dose was: Your next dose is: Take  by mouth. Takes one po daily. rizatriptan 5 mg rapid dissolve tablet Commonly known as:  MAXALT-MLT Your last dose was: Your next dose is: DIS 1 T ON THE TONGUE 1 TIME PRF MIGRAINE HA  1 T QD PRN MIGRAINE  
     
   
   
   
  
 TOPAMAX 200 mg tablet Generic drug:  topiramate Your last dose was: Your next dose is: Take 200 mg by mouth daily. 200 mg  
    
   
   
   
  
 TYLENOL EXTRA STRENGTH PO Your last dose was: Your next dose is: Take  by mouth. Takes 2 po as needed for leg or back pain. ULTRAM 50 mg tablet Generic drug:  traMADol Your last dose was: Your next dose is: Take 50 mg by mouth daily as needed for Pain (back pain). 50 mg  
    
   
   
   
  
 zolpidem 10 mg tablet Commonly known as:  AMBIEN Your last dose was: Your next dose is: Take 1 Tab by mouth nightly. Max Daily Amount: 10 mg.  
 10 mg  
    
   
   
   
  
 ZyrTEC 10 mg tablet Generic drug:  cetirizine Your last dose was: Your next dose is: Take 10 mg by mouth daily. 10 mg Opioid Education Prescription Opioids: What You Need to Know: 
 
Prescription opioids can be used to help relieve moderate-to-severe pain and are often prescribed following a surgery or injury, or for certain health conditions. These medications can be an important part of treatment but also come with serious risks. Opioids are strong pain medicines.  Examples include hydrocodone, oxycodone, fentanyl, and morphine. Heroin is an example of an illegal opioid. It is important to work with your health care provider to make sure you are getting the safest, most effective care. WHAT ARE THE RISKS AND SIDE EFFECTS OF OPIOID USE? Prescription opioids carry serious risks of addiction and overdose, especially with prolonged use. An opioid overdose, often marked by slow breathing, can cause sudden death. The use of prescription opioids can have a number of side effects as well, even when taken as directed. · Tolerance-meaning you might need to take more of a medication for the same pain relief · Physical dependence-meaning you have symptoms of withdrawal when the medication is stopped. Withdrawal symptoms can include nausea, sweating, chills, diarrhea, stomach cramps, and muscle aches. Withdrawal can last up to several weeks, depending on which drug you took and how long you took it. · Increased sensitivity to pain · Constipation · Nausea, vomiting, and dry mouth · Sleepiness and dizziness · Confusion · Depression · Low levels of testosterone that can result in lower sex drive, energy, and strength · Itching and sweating RISKS ARE GREATER WITH:      
· History of drug misuse, substance use disorder, or overdose · Mental health conditions (such as depression or anxiety) · Sleep apnea · Older age (72 years or older) · Pregnancy Avoid alcohol while taking prescription opioids. Also, unless specifically advised by your health care provider, medications to avoid include: · Benzodiazepines (such as Xanax or Valium) · Muscle relaxants (such as Soma or Flexeril) · Hypnotics (such as Ambien or Lunesta) · Other prescription opioids KNOW YOUR OPTIONS Talk to your health care provider about ways to manage your pain that don't involve prescription opioids. Some of these options may actually work better and have fewer risks and side effects.  Consult your physician before adding or stopping any medications, treatments, or physical activity. Options may include: 
· Pain relievers such as acetaminophen, ibuprofen, and naproxen · Some medications that are also used for depression or seizures · Physical therapy and exercise · Counseling to help patients learn how to cope better with triggers of pain and stress. · Application of heat or cold compress · Massage therapy · Relaxation techniques Be Informed Make sure you know the name of your medication, how much and how often to take it, and its potential risks & side effects. IF YOU ARE PRESCRIBED OPIOIDS FOR PAIN: 
· Never take opioids in greater amounts or more often than prescribed. Remember the goal is not to be pain-free but to manage your pain at a tolerable level. · Follow up with your primary care provider to: · Work together to create a plan on how to manage your pain. · Talk about ways to help manage your pain that don't involve prescription opioids. · Talk about any and all concerns and side effects. · Help prevent misuse and abuse. · Never sell or share prescription opioids · Help prevent misuse and abuse. · Store prescription opioids in a secure place and out of reach of others (this may include visitors, children, friends, and family). · Safely dispose of unused/unwanted prescription opioids: Find your community drug take-back program or your pharmacy mail-back program, or flush them down the toilet, following guidance from the Food and Drug Administration (www.fda.gov/Drugs/ResourcesForYou). · Visit www.cdc.gov/drugoverdose to learn about the risks of opioid abuse and overdose. · If you believe you may be struggling with addiction, tell your health care provider and ask for guidance or call [address] [place] [address] at [phone]. Discharge Instructions Carlos Reyna [id number] 
1950 COLON DISCHARGE INSTRUCTIONS Discomfort: 
Redness at IV site- apply warm compress to area; if redness or soreness persist- contact your physician There may be a slight amount of blood passed from the rectum Gaseous discomfort- walking, belching will help relieve any discomfort You may not operate a vehicle for 12 hours You may not engage in an occupation involving machinery or appliances for rest of today You may not drink alcoholic beverages for at least 12 hours Avoid making any critical decisions for at least 24 hour DIET: 
 Regular diet.  however -  remember your colon is empty and a heavy meal will produce gas. Avoid these foods:  vegetables, fried / greasy foods, carbonated drinks for today. ACTIVITY: 
You may resume your normal daily activities it is recommended that you spend the remainder of the day resting -  avoid any strenuous activity. CALL M.D. ANY SIGN OF: Increasing pain, nausea, vomiting Abdominal distension (swelling) New increased bleeding (oral or rectal) Fever (chills) Pain in chest area Bloody discharge from nose or mouth Shortness of breath You may  take any Advil, Aspirin, Ibuprofen, Motrin, Aleve, Goodys, or any similar pain or arthritis products or Tylenol as needed for pain. Follow-up Instructions: 
 Call Dr. Felicia Thomas Office telephone for problems or questions [phone] 
 
  - For colon cancer screening in this average-risk patient, colonoscopy may be repeated in 10 years. - Follow up in office for recurrent bleeding or constipation [employer] Activation Thank you for requesting access to [employer]. Please follow the instructions below to securely access and download your online medical record. [employer] allows you to send messages to your doctor, view your test results, renew your prescriptions, schedule appointments, and more. How Do I Sign Up? 1. In your internet browser, go to [email or web address] 
 2. Click on the First Time User? Click Here link in the Sign In box. You will be redirect to the New Member Sign Up page. 3. Enter your [employer] Access Code exactly as it appears below. You will not need to use this code after youve completed the sign-up process. If you do not sign up before the expiration date, you must request a new code. [employer] Access Code: [email or web address] Expires: 12/10/2018  9:05 AM (This is the date your [employer] access code will ) 4. Enter the last four digits of your Social Security Number (xxxx) and Date of Birth (mm/dd/yyyy) as indicated and click Submit. You will be taken to the next sign-up page. 5. Create a [employer] ID. This will be your [employer] login ID and cannot be changed, so think of one that is secure and easy to remember. 6. Create a [employer] password. You can change your password at any time. 7. Enter your Password Reset Question and Answer. This can be used at a later time if you forget your password. 8. Enter your e-mail address. You will receive e-mail notification when new information is available in [address] 9. Click Sign Up. You can now view and download portions of your medical record. 10. Click the Download Summary menu link to download a portable copy of your medical information. Additional Information If you have questions, please visit the Frequently Asked Questions section of the [employer] website at [email or web address] Remember, [employer] is NOT to be used for urgent needs. For medical emergencies, dial 911. Introducing [facility]! [facility] introduces [employer] patient portal. Now you can access parts of your medical record, email your doctor's office, and request medication refills online. 1. In your internet browser, go to [email or web address] 2. Click on the First Time User? Click Here link in the Sign In box. You will see the New Member Sign Up page. 3. Enter your [employer] Access Code exactly as it appears below. You will not need to use this code after youve completed the sign-up process. If you do not sign up before the expiration date, you must request a new code. · [employer] Access Code: [email or web address] Expires: 12/10/2018  9:05 AM 
 
4. Enter the last four digits of your Social Security Number (xxxx) and Date of Birth (mm/dd/yyyy) as indicated and click Submit. You will be taken to the next sign-up page. 5. Create a [employer] ID. This will be your [employer] login ID and cannot be changed, so think of one that is secure and easy to remember. 6. Create a [employer] password. You can change your password at any time. 7. Enter your Password Reset Question and Answer. This can be used at a later time if you forget your password. 8. Enter your e-mail address. You will receive e-mail notification when new information is available in [address] 9. Click Sign Up. You can now view and download portions of your medical record. 10. Click the Download Summary menu link to download a portable copy of your medical information. If you have questions, please visit the Frequently Asked Questions section of the [employer] website. Remember, [employer] is NOT to be used for urgent needs. For medical emergencies, dial 911. Now available from your iPhone and Android! Introducing Jack Singleton As a [employer] patient, I wanted to make you aware of our electronic visit tool called Jack Wolfrajinder. [employer] 24/7 allows you to connect within minutes with a medical provider 24 hours a day, seven days a week via a mobile device or tablet or logging into a secure website from your computer. You can access Jack Singleton from anywhere in the [place].  
 
A virtual visit might be right for you when you have a simple condition and feel like you just dont want to get out of bed, or cant get away from work for an appointment, when your regular [employer] provider is not available (evenings, weekends or holidays), or when youre out of town and need minor care. Electronic visits cost only $49 and if the [employer] 24/7 provider determines a prescription is needed to treat your condition, one can be electronically transmitted to a nearby pharmacy*. Please take a moment to enroll today if you have not already done so. The enrollment process is free and takes just a few minutes. To enroll, please download the [employer] 24/7 imtiaz to your tablet or phone, or visit [email or web address] to enroll on your computer. And, as an [address] [place] [address] patient with a [employer] account, the results of your visits will be scanned into your electronic medical record and your primary care provider will be able to view the scanned results. We urge you to continue to see your regular [employer] provider for your ongoing medical care. And while your primary care provider may not be the one available when you seek a [employer] virtual visit, the peace of mind you get from getting a real diagnosis real time can be priceless. For more information on [employer], view our Frequently Asked Questions (FAQs) at [email or web address]. Sincerely, 
 
Zahira Dickinson MD 
Chief Medical Officer [facility] Smiley Abad *:  certain medications cannot be prescribed via [employer] Providers Seen During Your Hospitalization Provider Specialty Primary office phone Heath Parker MD Gastroenterology [phone] Your Primary Care Physician (PCP) Primary Care Physician Office Phone Office Fax UNKNOWN, PROVIDER ** None ** ** None ** You are allergic to the following Allergen Reactions Macrodantin (Nitrofurantoin Macrocrystalline) Other (comments) Low WBC Nitrofurantoin Other (comments) LOW WBC - 2000 Nsaids (Non-Steroidal Anti-Inflammatory Drug) Other (comments) GI bleed. Other Plant, Animal, Environmental Other (comments) Mold, dust, cats, dog allergies. ENVIRONMENTAL ALLERGIES. Tolmetin Unknown (comments) Toradol (Ketorolac Tromethamine) Rash Recent Documentation Height Weight Breastfeeding? BMI OB Status Smoking Status 1.549 m 69.4 kg No 28.91 kg/m2 Hysterectomy Never Smoker Emergency Contacts Name Discharge Info Relation Home Work Mobile Nikhil Donaldson DISCHARGE CAREGIVER [3] Friend [5]   [phone] Dejaleonor Guerrero  Child [2] [phone] Karson Migueln [3] [phone] Patient Belongings The following personal items are in your possession at time of discharge: 
  Dental Appliances: None  Visual Aid: Glasses Please provide this summary of care documentation to your next provider. Signatures-by signing, you are acknowledging that this After Visit Summary has been reviewed with you and you have received a copy. Patient Signature:  ____________________________________________________________ Date:  ____________________________________________________________  
  
Harriet [place] Provider Signature:  ____________________________________________________________ Date:  ____________________________________________________________

## 2018-11-05 RX ORDER — GEMFIBROZIL 600 MG/1
600 TABLET, FILM COATED ORAL 2 TIMES DAILY
Qty: 60 TAB | Refills: 5 | Status: SHIPPED | OUTPATIENT
Start: 2018-11-05 | End: 2018-11-05 | Stop reason: SDUPTHER

## 2018-11-05 RX ORDER — GEMFIBROZIL 600 MG/1
TABLET, FILM COATED ORAL
Qty: 180 TAB | Refills: 5 | Status: SHIPPED | OUTPATIENT
Start: 2018-11-05 | End: 2019-12-06 | Stop reason: SDUPTHER

## 2018-11-05 RX ORDER — EZETIMIBE 10 MG/1
10 TABLET ORAL DAILY
Qty: 30 TAB | Refills: 5 | Status: SHIPPED | OUTPATIENT
Start: 2018-11-05 | End: 2018-11-05 | Stop reason: SDUPTHER

## 2018-11-05 RX ORDER — EZETIMIBE 10 MG/1
TABLET ORAL
Qty: 90 TAB | Refills: 5 | Status: SHIPPED | OUTPATIENT
Start: 2018-11-05 | End: 2019-12-09 | Stop reason: SDUPTHER

## 2018-12-06 ENCOUNTER — OFFICE VISIT (OUTPATIENT)
Dept: FAMILY MEDICINE CLINIC | Age: 68
End: 2018-12-06

## 2018-12-06 ENCOUNTER — DOCUMENTATION ONLY (OUTPATIENT)
Dept: FAMILY MEDICINE CLINIC | Age: 68
End: 2018-12-06

## 2018-12-06 VITALS
SYSTOLIC BLOOD PRESSURE: 120 MMHG | HEART RATE: 70 BPM | HEIGHT: 61 IN | OXYGEN SATURATION: 99 % | WEIGHT: 157 LBS | TEMPERATURE: 98.5 F | DIASTOLIC BLOOD PRESSURE: 77 MMHG | BODY MASS INDEX: 29.64 KG/M2 | RESPIRATION RATE: 16 BRPM

## 2018-12-06 DIAGNOSIS — G47.00 INSOMNIA, UNSPECIFIED TYPE: ICD-10-CM

## 2018-12-06 RX ORDER — ZOLPIDEM TARTRATE 5 MG/1
5 TABLET ORAL
Qty: 30 TAB | Refills: 0 | Status: CANCELLED | OUTPATIENT
Start: 2018-12-09 | End: 2019-01-08

## 2018-12-06 RX ORDER — ZOLPIDEM TARTRATE 5 MG/1
5 TABLET ORAL
Qty: 30 TAB | Refills: 0 | Status: CANCELLED | OUTPATIENT
Start: 2019-02-09 | End: 2019-03-11

## 2018-12-06 RX ORDER — ZOLPIDEM TARTRATE 10 MG/1
10 TABLET ORAL
Qty: 30 TAB | Refills: 2 | Status: SHIPPED | OUTPATIENT
Start: 2018-12-08 | End: 2019-03-08 | Stop reason: SDUPTHER

## 2018-12-06 RX ORDER — ZOLPIDEM TARTRATE 5 MG/1
5 TABLET ORAL
Qty: 30 TAB | Refills: 0 | Status: CANCELLED | OUTPATIENT
Start: 2019-01-09 | End: 2019-02-08

## 2018-12-06 RX ORDER — CHOLECALCIFEROL (VITAMIN D3) 125 MCG
CAPSULE ORAL
COMMUNITY
End: 2019-03-08 | Stop reason: SDUPTHER

## 2018-12-06 NOTE — PROGRESS NOTES
Chief Complaint Patient presents with  Medication Refill AMBIEN REFILL Health Maintenance Due Topic  Hepatitis C Screening  DTaP/Tdap/Td series (1 - Tdap)  Shingrix Vaccine Age 50> (1 of 2)  FOBT Q 1 YEAR AGE 50-75  GLAUCOMA SCREENING Q2Y  Bone Densitometry (Dexa) Screening  Pneumococcal 65+ High/Highest Risk (2 of 2 - PPSV23)  BREAST CANCER SCRN MAMMOGRAM   
 Influenza Age 5 to Adult  MEDICARE YEARLY EXAM   
 
 
Wt Readings from Last 3 Encounters:  
12/06/18 157 lb (71.2 kg) 10/01/18 153 lb (69.4 kg) 09/11/18 153 lb 4.8 oz (69.5 kg) Temp Readings from Last 3 Encounters:  
12/06/18 98.5 °F (36.9 °C) (Oral) 10/01/18 97.9 °F (36.6 °C)  
09/11/18 98.4 °F (36.9 °C) (Oral) BP Readings from Last 3 Encounters:  
12/06/18 120/77  
10/01/18 97/58  
09/11/18 128/84 Pulse Readings from Last 3 Encounters:  
12/06/18 70  
10/01/18 (!) 58  
09/11/18 66 Learning Assessment: 
:  
 
Learning Assessment 9/11/2018 PRIMARY LEARNER Patient PRIMARY LANGUAGE ENGLISH  
LEARNER PREFERENCE PRIMARY READING  
ANSWERED BY patient RELATIONSHIP SELF Depression Screening: 
:  
 
PHQ over the last two weeks 12/6/2018 Little interest or pleasure in doing things Not at all Feeling down, depressed, irritable, or hopeless Not at all Total Score PHQ 2 0 Fall Risk Assessment: 
:  
 
Fall Risk Assessment, last 12 mths 12/6/2018 Able to walk? Yes Fall in past 12 months? No  
 
 
Abuse Screening: 
:  
 
Abuse Screening Questionnaire 9/11/2018 Do you ever feel afraid of your partner? Yvette Rota Are you in a relationship with someone who physically or mentally threatens you? Yvette Rota Is it safe for you to go home? Jessica Ledesma Coordination of Care Questionnaire: 
:  
 
1) Have you been to an emergency room, urgent care clinic since your last visit? NO Hospitalized since your last visit?  NO          
 
 2) Have you seen or consulted any other health care providers outside of [address] [place] [address] since your last visit? NO 
  (Include any pap smears or colon screenings in this section.)  COLONOSCOPY  2018 3) Do you have an Advance Directive on file? YES Patient is accompanied by self I have received verbal consent from Fidel Britt to discuss any/all medical information while they are present in the room.

## 2018-12-06 NOTE — PROGRESS NOTES
I have reviewed the notes, assessments, and/or procedures performed, I concur with the residents documentation of Sanjay Estrada.

## 2018-12-06 NOTE — PROGRESS NOTES
12/6/2018 Chief Complaint Patient presents with  Medication Refill AMBIEN REFILL  
 
 
HPI: 
Hailey Bellamy is a 77 y.o. female who presents to clinic today for controlled medication follow up. They were last seen on 9/11/18. Patient currently has diagnosis of insomnia. Patient's current medication includes Ambien 10mg qHS. Patient has a controlled medication contract with Dr. Rl Diallo. Patient states they are taking medications as prescribed and have not missed any doses of medications. Patient's symptoms have been well controlled. Psychiatric, addiction and substance abuse history of the patient/family:PTSD. Patient has been sleeping >8 hrs with medication. Has tried 5mg again, and has had worsening sleep when doing that. Patients past medical, surgical and family histories were reviewed. Allergies and Medications reviewed and updated. Past Medical History:  
Diagnosis Date  Aneurysm ([address]   
 splenic artery aneurysm - no longer needs to be followed up - will never be a problem  Arthritis   
 back  Chronic kidney disease BUN/creatinine elevated - being evaluated/may be due to diovan - occasional BUN and creatinine elevated but is mostly normal  
 Chronic pain   
 back  Chronic renal failure  Hyperlipidemia  Hypertension  Ill-defined condition   
 walks with a cane  Ill-defined condition   
 cardiac calcium test - some build up/stress test is \"fine\" per pt  Lumbar stenosis  Migraine  Other ill-defined conditions(799.89)   
 wheezing with allergies  Other ill-defined conditions(799.89) GI bleed - stomach - NSAID use  Overactive bladder  PUD (peptic ulcer disease)  Stenosis of lumbosacral spine  Thromboembolus ([address]  PE  
 
 
 
 
Current Outpatient Medications:  
  cholecalciferol, vitamin D3, 2,000 unit tab, Take  by mouth., Disp: , Rfl:  
  multivitamin (MULTIPLE VITAMINS PO), Take  by mouth., Disp: , Rfl:  
   Lactobacillus acidophilus (ACIDOPHILUS PO), Take  by mouth., Disp: , Rfl:  
  [START ON 12/8/2018] zolpidem (AMBIEN) 10 mg tablet, Take 1 Tab by mouth nightly. Max Daily Amount: 10 mg., Disp: 30 Tab, Rfl: 2 
  gemfibrozil (LOPID) 600 mg tablet, TAKE 1 TABLET BY MOUTH TWICE DAILY, Disp: 180 Tab, Rfl: 5 
  ezetimibe (ZETIA) 10 mg tablet, TAKE 1 TABLET BY MOUTH DAILY, Disp: 90 Tab, Rfl: 5 
  topiramate (TOPAMAX) 200 mg tablet, Take 200 mg by mouth daily. , Disp: , Rfl:  
  gabapentin (NEURONTIN) 300 mg capsule, Take 300 mg by mouth nightly., Disp: , Rfl:  
  cholecalciferol, vitamin D3, 2,000 unit tab, Take 2,000 Units by mouth daily. , Disp: , Rfl:  
  turmeric (CURCUMIN), Take 600 mg by mouth daily. , Disp: , Rfl:  
  acetaminophen (TYLENOL EXTRA STRENGTH PO), Take  by mouth. Takes 2 po as needed for leg or back pain., Disp: , Rfl:  
  metoprolol tartrate (LOPRESSOR) 25 mg tablet, Take 1 Tab by mouth two (2) times a day., Disp: 180 Tab, Rfl: 1   rizatriptan (MAXALT-MLT) 5 mg rapid dissolve tablet, DIS 1 T ON THE TONGUE 1 TIME PRF MIGRAINE HA  1 T QD PRN MIGRAINE, Disp: , Rfl:  
  LACTOBACILLUS ACIDOPHILUS (PROBIOTIC PO), Take  by mouth. Takes one po daily. , Disp: , Rfl:  
  MULTIVITAMIN PO, Take  by mouth. Takes one po daily, Disp: , Rfl:  
  cetirizine (ZYRTEC) 10 mg tablet, Take 10 mg by mouth daily. , Disp: , Rfl:  
 
 
Review of Systems - (Positive ROS in BOLD) Constitutional: fever, chills, fatigue Respiratory: cough, shortness of breath or wheezing Cardiovascular: chest pain or palpitations Gastrointestinal: abdominal pain, constipation, diarrhea, nausea, vomiting Neurological: dizziness or headaches Dermatological: rashes, skin lesions Vitals: 
Vitals:  
 12/06/18 0930 BP: 120/77 Pulse: 70 Resp: 16 Temp: 98.5 °F (36.9 °C) TempSrc: Oral  
SpO2: 99% Weight: 157 lb (71.2 kg) Height: 5' 1\" (1.549 m) Body mass index is 29.66 kg/m². Objective General: Patient alert and oriented and in NAD HEENT: PER/EOMI, no conjunctival pallor or scleral icterus. No thyromegaly or cervical lymphadenopathy Heart: Regular rate and rhythm, No murmurs, rubs or gallops. Lungs: Clear to auscultation bilaterally, no wheezing, rales or rhonchi Abd: +BS, non-tender, non-distended Ext: No edema Skin: No rashes or lesions noted on exposed skin Neuro: AAOx3 Psych: Appropriate mood and affect Last UDS on 9/4 showed zolpidem and Topamax Assessment and Plan: 
1. Insomnia, unspecified type This is a chronic problem that is stable. Per review of available records and patients , there are not signs of overuse, misuse, diversion, or concerning side effects. Today we reviewed: the risk of overdose, addiction, and dependency the goals of treatment (improve functionality, quality of life, and pain) tapering to a lower dose  The following changes were made to the patients current treatment plan: nothing, medications refilled. Advised that she can continue to try and wean down to 5mg, discussed FDA guidelines, she has no morning drowsiness and good quality of life. No UDS needed today 
 
- zolpidem (AMBIEN) 10 mg tablet; Take 1 Tab by mouth nightly. Max Daily Amount: 10 mg. Dispense: 30 Tab; Refill: 2. Prescribed by Dr. Saint Clair I have discussed the diagnosis with the patient and the intended plan as seen in the above orders. She has expressed understanding. The patient has received an after-visit summary and questions were answered concerning future plans. I have discussed medication side effects and warnings with the patient as well. Follow-up Disposition: 
Return in about 3 months (around 3/6/2019) for Insomnia follow up.  
 
Electronically Signed: Kiel Chery MD

## 2019-02-11 ENCOUNTER — TELEPHONE (OUTPATIENT)
Dept: FAMILY MEDICINE CLINIC | Age: 69
End: 2019-02-11

## 2019-02-13 ENCOUNTER — TELEPHONE (OUTPATIENT)
Dept: FAMILY MEDICINE CLINIC | Age: 69
End: 2019-02-13

## 2019-03-08 ENCOUNTER — OFFICE VISIT (OUTPATIENT)
Dept: FAMILY MEDICINE CLINIC | Age: 69
End: 2019-03-08

## 2019-03-08 VITALS
OXYGEN SATURATION: 98 % | RESPIRATION RATE: 18 BRPM | SYSTOLIC BLOOD PRESSURE: 138 MMHG | BODY MASS INDEX: 30.21 KG/M2 | HEIGHT: 61 IN | DIASTOLIC BLOOD PRESSURE: 81 MMHG | TEMPERATURE: 98.6 F | HEART RATE: 71 BPM | WEIGHT: 160 LBS

## 2019-03-08 DIAGNOSIS — G47.00 INSOMNIA, UNSPECIFIED TYPE: Primary | ICD-10-CM

## 2019-03-08 DIAGNOSIS — I10 ESSENTIAL HYPERTENSION: ICD-10-CM

## 2019-03-08 RX ORDER — ZOLPIDEM TARTRATE 10 MG/1
10 TABLET ORAL
Qty: 30 TAB | Refills: 2 | Status: SHIPPED | OUTPATIENT
Start: 2019-03-08 | End: 2019-06-05 | Stop reason: SDUPTHER

## 2019-03-08 NOTE — PROGRESS NOTES
3/8/2019   Chief Complaint   Patient presents with    Medication Refill       HPI:  Cari Mccabe is a 68 y.o. female who presents to clinic today for controlled medication follow up. They were last seen on 12/6/18. Patient currently has diagnosis of insomnia. Patient's current medication includes Ambien 10mg. Patient has a controlled medication contract with the clinic on 8/6/18. Patient states they are taking medications as prescribed, tries to sleep without taking it, but if having trouble will take Ambien, last pill was last night. Patient states she has tried to wean down to 5mg in the past, and this has brought back her symptoms of insmonia, so she has stayed at 10mg dosage. Patient's symptoms have been well controlled. Psychiatric, addiction and substance abuse history of the patient:None. She occasionally uses alcohol, never with ambien, no substance abuse    Patients past medical, surgical and family histories were reviewed and updated  Allergies and Medications reviewed and updated. Current Outpatient Medications:     zolpidem (AMBIEN) 10 mg tablet, Take 1 Tab by mouth nightly as needed for Sleep. Max Daily Amount: 10 mg., Disp: 30 Tab, Rfl: 2    KRILL OIL PO, Take  by mouth., Disp: , Rfl:     multivitamin (MULTIPLE VITAMINS PO), Take  by mouth., Disp: , Rfl:     Lactobacillus acidophilus (ACIDOPHILUS PO), Take  by mouth., Disp: , Rfl:     gemfibrozil (LOPID) 600 mg tablet, TAKE 1 TABLET BY MOUTH TWICE DAILY, Disp: 180 Tab, Rfl: 5    ezetimibe (ZETIA) 10 mg tablet, TAKE 1 TABLET BY MOUTH DAILY, Disp: 90 Tab, Rfl: 5    topiramate (TOPAMAX) 200 mg tablet, Take 200 mg by mouth daily. , Disp: , Rfl:     gabapentin (NEURONTIN) 300 mg capsule, Take 300 mg by mouth nightly., Disp: , Rfl:     cholecalciferol, vitamin D3, 2,000 unit tab, Take 2,000 Units by mouth daily. , Disp: , Rfl:     turmeric (CURCUMIN), Take 600 mg by mouth daily. , Disp: , Rfl:     acetaminophen (TYLENOL EXTRA STRENGTH PO), Take  by mouth. Takes 2 po as needed for leg or back pain., Disp: , Rfl:     metoprolol tartrate (LOPRESSOR) 25 mg tablet, Take 1 Tab by mouth two (2) times a day., Disp: 180 Tab, Rfl: 1    rizatriptan (MAXALT-MLT) 5 mg rapid dissolve tablet, DIS 1 T ON THE TONGUE 1 TIME PRF MIGRAINE HA  1 T QD PRN MIGRAINE, Disp: , Rfl:     MULTIVITAMIN PO, Take  by mouth. Takes one po daily, Disp: , Rfl:     cetirizine (ZYRTEC) 10 mg tablet, Take 10 mg by mouth daily. , Disp: , Rfl:       Review of Systems -   (Positive ROS in BOLD)    (Positive ROS in BOLD)    Constitutional: fever, chills, fatigue  Respiratory: cough, shortness of breath or wheezing  Cardiovascular: chest pain or palpitations  GI: Abd pain, nausea, vomiting      Vitals:  Vitals:    03/08/19 1019   BP: 138/81   Pulse: 71   Resp: 18   Temp: 98.6 °F (37 °C)   TempSrc: Oral   SpO2: 98%   Weight: 160 lb (72.6 kg)   Height: 5' 1\" (1.549 m)     Body mass index is 30.23 kg/m². Objective  General: Patient in NAD, sitting comfortably  HEENT: EOMI, no conjunctival pallor or scleral icterus. Cardiovascular: Regular rate and rhythm, No murmurs, rubs or gallops. No LE edema  Respiratory: Lungs clear to auscultation bilaterally, no wheezing, rales or rhonchi. No accessory muscle use. GI:  Normoactive BS. No TTP in all 4 quadrants  Psych: Normal mood and affect, good judgement, appropriate insight    Last UDS 09/2018, showed zolpidem, no other ilicit substances        Assessment and Plan:  1. Insomnia, unspecified type    - zolpidem (AMBIEN) 10 mg tablet; Take 1 Tab by mouth nightly as needed for Sleep. Max Daily Amount: 10 mg. Dispense: 30 Tab; Refill: 2      This is a chronic problem that is stable. Per review of available records and patients , there are not signs of overuse, misuse, diversion, or concerning side effects.  Today we reviewed:the risk of overdose, addiction, and dependency, proper storage and disposal of medications,  the goals of treatment (improve functionality, quality of life), tapering to a lower dose. The following changes were made to the patients current treatment plan: nothing, medications refilled. Discussed FDA guidelines and risks of females taking 10mg dose, states she has no morning drowsiness or grogginess, an understands risks of continuing to take this dose, but for her benefits of improvement of quality oflife, outweigh risks of side effects based on her usage of medication the past few years. She will continue to try and taper medication    Also of note, pt overdue for annual medicare wellness visit, will order labs and have patient schedule annual exam within the month      I have discussed the diagnosis with the patient and the intended plan as seen in the above orders. She has expressed understanding. The patient has received an after-visit summary and questions were answered concerning future plans. I have discussed medication side effects and warnings with the patient as well. Follow-up Disposition:  Return in about 3 months (around 6/8/2019) for Insomnia F/U.     Electronically Signed: Sarahi Rai MD

## 2019-03-08 NOTE — PROGRESS NOTES
1. Have you been to the ER, urgent care clinic since your last visit? Hospitalized since your last visit? No    2. Have you seen or consulted any other health care providers outside of the [address] [place] [address] since your last visit? Include any pap smears or colon screening.  No

## 2019-03-16 LAB
ALBUMIN SERPL-MCNC: 5 G/DL (ref 3.5–4.8)
ALBUMIN/CREAT UR: 24 MG/G CREAT (ref 0–30)
ALBUMIN/GLOB SERPL: 1.9 {RATIO} (ref 1.2–2.2)
ALP SERPL-CCNC: 102 IU/L (ref 39–117)
ALT SERPL-CCNC: 15 IU/L (ref 0–32)
AST SERPL-CCNC: 23 IU/L (ref 0–40)
BILIRUB SERPL-MCNC: 0.4 MG/DL (ref 0–1.2)
BUN SERPL-MCNC: 19 MG/DL (ref 8–27)
BUN/CREAT SERPL: 20 (ref 12–28)
CALCIUM SERPL-MCNC: 10 MG/DL (ref 8.7–10.3)
CHLORIDE SERPL-SCNC: 103 MMOL/L (ref 96–106)
CHOLEST SERPL-MCNC: 230 MG/DL (ref 100–199)
CO2 SERPL-SCNC: 21 MMOL/L (ref 20–29)
CREAT SERPL-MCNC: 0.96 MG/DL (ref 0.57–1)
CREAT UR-MCNC: 22.9 MG/DL
GLOBULIN SER CALC-MCNC: 2.7 G/DL (ref 1.5–4.5)
GLUCOSE SERPL-MCNC: 96 MG/DL (ref 65–99)
HDLC SERPL-MCNC: 66 MG/DL
LDLC SERPL CALC-MCNC: 149 MG/DL (ref 0–99)
MICROALBUMIN UR-MCNC: 5.5 UG/ML
POTASSIUM SERPL-SCNC: 4.9 MMOL/L (ref 3.5–5.2)
PROT SERPL-MCNC: 7.7 G/DL (ref 6–8.5)
SODIUM SERPL-SCNC: 139 MMOL/L (ref 134–144)
TRIGL SERPL-MCNC: 75 MG/DL (ref 0–149)
VLDLC SERPL CALC-MCNC: 15 MG/DL (ref 5–40)

## 2019-03-29 RX ORDER — METOPROLOL TARTRATE 25 MG/1
TABLET, FILM COATED ORAL
Qty: 180 TAB | Refills: 0 | Status: SHIPPED | OUTPATIENT
Start: 2019-03-29 | End: 2019-06-25 | Stop reason: SDUPTHER

## 2019-05-23 ENCOUNTER — HOSPITAL ENCOUNTER (OUTPATIENT)
Dept: MAMMOGRAPHY | Age: 69
Discharge: HOME | End: 2019-05-23
Attending: FAMILY MEDICINE
Payer: MEDICARE

## 2019-05-23 DIAGNOSIS — Z12.31 VISIT FOR SCREENING MAMMOGRAM: ICD-10-CM

## 2019-05-23 PROCEDURE — 77067 SCR MAMMO BI INCL CAD: CPT

## 2019-06-05 ENCOUNTER — OFFICE VISIT (OUTPATIENT)
Dept: FAMILY MEDICINE CLINIC | Age: 69
End: 2019-06-05

## 2019-06-05 VITALS
BODY MASS INDEX: 29.83 KG/M2 | RESPIRATION RATE: 14 BRPM | OXYGEN SATURATION: 96 % | SYSTOLIC BLOOD PRESSURE: 113 MMHG | HEIGHT: 61 IN | TEMPERATURE: 98 F | DIASTOLIC BLOOD PRESSURE: 80 MMHG | HEART RATE: 85 BPM | WEIGHT: 158 LBS

## 2019-06-05 DIAGNOSIS — G47.00 INSOMNIA, UNSPECIFIED TYPE: ICD-10-CM

## 2019-06-05 RX ORDER — ELETRIPTAN HYDROBROMIDE 40 MG/1
40 TABLET, FILM COATED ORAL
COMMUNITY
End: 2021-05-12 | Stop reason: ALTCHOICE

## 2019-06-05 RX ORDER — ZOLPIDEM TARTRATE 10 MG/1
10 TABLET ORAL
Qty: 30 TAB | Refills: 2 | Status: SHIPPED | OUTPATIENT
Start: 2019-06-05 | End: 2019-09-05 | Stop reason: SDUPTHER

## 2019-06-05 NOTE — PROGRESS NOTES
Assessment and Plan    1. Insomnia, unspecified type  Continue current dose. Benefit includes migraine prevention  Warned of amnestic events, she will discuss with family. - zolpidem (AMBIEN) 10 mg tablet; Take 1 Tab by mouth nightly as needed for Sleep. Max Daily Amount: 10 mg. Dispense: 30 Tab; Refill: 2      Follow-up and Dispositions    · Return in about 3 months (around 9/5/2019) for Medication follow up. Diagnosis and plan discussed with patient who verbillized understanding. History of present Zofia Lake is a 68 y.o. female presenting for Medication Refill (Ambien)      Insomnia  Taking ambien regularly  Has not gotten relief with 5 mg size  She has increased migraine frequency without the med  No side effects    Review of Systems   Neurological: Negative for headaches. Psychiatric/Behavioral: Negative for depression and hallucinations. The patient does not have insomnia.           Past Medical History:   Diagnosis Date    Aneurysm [facility])     splenic artery aneurysm - no longer needs to be followed up - will never be a problem    Arthritis     back    Chronic kidney disease     BUN/creatinine elevated - being evaluated/may be due to diovan - occasional BUN and creatinine elevated but is mostly normal    Chronic pain     back    Chronic renal failure     Hyperlipidemia     Hypertension     Ill-defined condition     walks with a cane    Ill-defined condition     cardiac calcium test - some build up/stress test is \"fine\" per pt    Lumbar stenosis     Migraine     Other ill-defined conditions(799.89)     wheezing with allergies    Other ill-defined conditions(799.89)     GI bleed - stomach - NSAID use    Overactive bladder     PUD (peptic ulcer disease)     Stenosis of lumbosacral spine     Thromboembolus ([address]     PE     Past Surgical History:   Procedure Laterality Date    COLONOSCOPY N/A 10/1/2018    COLONOSCOPY performed by Pushpa Collazo [facility] at [facility]    HX HYSTERECTOMY      HX ORTHOPAEDIC  2009    spinal fusion/has not had a lumbar laminectomy    HX ORTHOPAEDIC Bilateral     bunionectomy - bilateral once and unilateral once    HX ORTHOPAEDIC Bilateral     carpal tunnel release    HX TONSILLECTOMY      T & A    HX UROLOGICAL  2012    bladder repair     Family History   Problem Relation Age of Onset    Cancer Father         bladder    Cancer Brother         BCCA    Stroke Mother     Cancer Maternal Uncle         leukemia    Breast Cancer Paternal Aunt     Stroke Maternal Grandmother     Colon Cancer Paternal Grandfather     Cancer Maternal Uncle         throat     Social History     Socioeconomic History    Marital status:      Spouse name: Not on file    Number of children: Not on file    Years of education: Not on file    Highest education level: Not on file   Occupational History    Not on file   Social Needs    Financial resource strain: Not on file    Food insecurity:     Worry: Not on file     Inability: Not on file    Transportation needs:     Medical: Not on file     Non-medical: Not on file   Tobacco Use    Smoking status: Never Smoker    Smokeless tobacco: Never Used   Substance and Sexual Activity    Alcohol use: Yes     Comment: very, very rare    Drug use: No    Sexual activity: Yes     Partners: Male   Lifestyle    Physical activity:     Days per week: Not on file     Minutes per session: Not on file    Stress: Not on file   Relationships    Social connections:     Talks on phone: Not on file     Gets together: Not on file     Attends Scientologist service: Not on file     Active member of club or organization: Not on file     Attends meetings of clubs or organizations: Not on file     Relationship status: Not on file    Intimate partner violence:     Fear of current or ex partner: Not on file     Emotionally abused: Not on file     Physically abused: Not on file     Forced sexual activity: Not on file   Other Topics Concern    Not on file   Social History Narrative    Not on file         Current Outpatient Medications   Medication Sig Dispense Refill    zolpidem (AMBIEN) 10 mg tablet Take 1 Tab by mouth nightly as needed for Sleep. Max Daily Amount: 10 mg. 30 Tab 2    eletriptan (RELPAX) 20 mg tablet Take 20 mg by mouth once as needed. may repeat in 2 hours if necessary      metoprolol tartrate (LOPRESSOR) 25 mg tablet TAKE 1 TABLET BY MOUTH TWICE DAILY 180 Tab 0    KRILL OIL PO Take  by mouth.  multivitamin (MULTIPLE VITAMINS PO) Take  by mouth.  Lactobacillus acidophilus (ACIDOPHILUS PO) Take  by mouth.  gemfibrozil (LOPID) 600 mg tablet TAKE 1 TABLET BY MOUTH TWICE DAILY 180 Tab 5    ezetimibe (ZETIA) 10 mg tablet TAKE 1 TABLET BY MOUTH DAILY 90 Tab 5    topiramate (TOPAMAX) 200 mg tablet Take 200 mg by mouth two (2) times a day.  gabapentin (NEURONTIN) 300 mg capsule Take 300 mg by mouth as needed.  cholecalciferol, vitamin D3, 2,000 unit tab Take 2,000 Units by mouth daily.  turmeric (CURCUMIN) Take 600 mg by mouth daily.  acetaminophen (TYLENOL EXTRA STRENGTH PO) Take  by mouth. Takes 2 po as needed for leg or back pain.  MULTIVITAMIN PO Take  by mouth. Takes one po daily      cetirizine (ZYRTEC) 10 mg tablet Take 10 mg by mouth daily. Allergies   Allergen Reactions    Macrodantin [Nitrofurantoin Macrocrystalline] Other (comments)     Low WBC    Nitrofurantoin Other (comments)     LOW WBC - 2000    Nsaids (Non-Steroidal Anti-Inflammatory Drug) Other (comments)     GI bleed.  Other Plant, Animal, Environmental Other (comments)     Mold, dust, cats, dog allergies. ENVIRONMENTAL ALLERGIES.     Tolmetin Unknown (comments)    Toradol [Ketorolac Tromethamine] Rash       Vitals:    06/05/19 1549   BP: 113/80   Pulse: 85   Resp: 14   Temp: 98 °F (36.7 °C)   TempSrc: Oral   SpO2: 96%   Weight: 158 lb (71.7 kg)   Height: 5' 1\" (1.549 m)     Body mass index is 29.85 kg/m². Objective  General: Patient alert and oriented and in NAD  Neuro: AAOx3, normal gait and speech. No gross neurologic deficits. Psych: Appropriate mood and affect, no homicidal or suicidal ideation, no obsessions, delusions or hallucinations, normal psychomotor status.

## 2019-06-05 NOTE — PATIENT INSTRUCTIONS

## 2019-06-05 NOTE — PROGRESS NOTES
Hailey Green is a 68 y.o. female      Chief Complaint   Patient presents with    Medication Refill     Ambien       1. Have you been to the ER, urgent care clinic since your last visit? Hospitalized since your last visit? No    2. Have you seen or consulted any other health care providers outside of the [address] [place] [address] since your last visit? Include any pap smears or colon screening.  No

## 2019-06-27 RX ORDER — METOPROLOL TARTRATE 25 MG/1
TABLET, FILM COATED ORAL
Qty: 180 TAB | Refills: 0 | Status: SHIPPED | OUTPATIENT
Start: 2019-06-27 | End: 2019-07-16 | Stop reason: SDUPTHER

## 2019-07-18 RX ORDER — METOPROLOL TARTRATE 25 MG/1
TABLET, FILM COATED ORAL
Qty: 180 TAB | Refills: 0 | Status: SHIPPED | OUTPATIENT
Start: 2019-07-18 | End: 2019-10-15 | Stop reason: SDUPTHER

## 2019-09-05 ENCOUNTER — OFFICE VISIT (OUTPATIENT)
Dept: FAMILY MEDICINE CLINIC | Age: 69
End: 2019-09-05

## 2019-09-05 VITALS
BODY MASS INDEX: 30.58 KG/M2 | OXYGEN SATURATION: 97 % | TEMPERATURE: 98.6 F | SYSTOLIC BLOOD PRESSURE: 111 MMHG | HEIGHT: 61 IN | RESPIRATION RATE: 16 BRPM | DIASTOLIC BLOOD PRESSURE: 75 MMHG | WEIGHT: 162 LBS | HEART RATE: 62 BPM

## 2019-09-05 DIAGNOSIS — I10 ESSENTIAL HYPERTENSION: ICD-10-CM

## 2019-09-05 DIAGNOSIS — Z13.31 SCREENING FOR DEPRESSION: ICD-10-CM

## 2019-09-05 DIAGNOSIS — E78.5 HYPERLIPIDEMIA, UNSPECIFIED HYPERLIPIDEMIA TYPE: ICD-10-CM

## 2019-09-05 DIAGNOSIS — Z11.59 NEED FOR HEPATITIS C SCREENING TEST: ICD-10-CM

## 2019-09-05 DIAGNOSIS — M89.9 DISORDER OF BONE AND CARTILAGE: ICD-10-CM

## 2019-09-05 DIAGNOSIS — J30.1 NON-SEASONAL ALLERGIC RHINITIS DUE TO POLLEN: ICD-10-CM

## 2019-09-05 DIAGNOSIS — E66.9 CLASS 1 OBESITY WITH SERIOUS COMORBIDITY IN ADULT, UNSPECIFIED BMI, UNSPECIFIED OBESITY TYPE: ICD-10-CM

## 2019-09-05 DIAGNOSIS — Z13.39 SCREENING FOR ALCOHOLISM: ICD-10-CM

## 2019-09-05 DIAGNOSIS — E03.9 ACQUIRED HYPOTHYROIDISM: ICD-10-CM

## 2019-09-05 DIAGNOSIS — Z01.818 PREOPERATIVE EXAMINATION: ICD-10-CM

## 2019-09-05 DIAGNOSIS — Z00.00 MEDICARE ANNUAL WELLNESS VISIT, SUBSEQUENT: Primary | ICD-10-CM

## 2019-09-05 DIAGNOSIS — G47.00 INSOMNIA, UNSPECIFIED TYPE: ICD-10-CM

## 2019-09-05 DIAGNOSIS — M94.9 DISORDER OF BONE AND CARTILAGE: ICD-10-CM

## 2019-09-05 RX ORDER — MONTELUKAST SODIUM 10 MG/1
10 TABLET ORAL DAILY
Qty: 90 TAB | Refills: 1 | Status: SHIPPED | OUTPATIENT
Start: 2019-09-05 | End: 2020-03-12

## 2019-09-05 RX ORDER — ZOLPIDEM TARTRATE 10 MG/1
10 TABLET ORAL
Qty: 30 TAB | Refills: 2 | Status: SHIPPED | OUTPATIENT
Start: 2019-09-05 | End: 2019-12-11 | Stop reason: SDUPTHER

## 2019-09-05 NOTE — PATIENT INSTRUCTIONS
Medicare Wellness Visit, Female     The best way to live healthy is to have a lifestyle where you eat a well-balanced diet, exercise regularly, limit alcohol use, and quit all forms of tobacco/nicotine, if applicable. Regular preventive services are another way to keep healthy. Preventive services (vaccines, screening tests, monitoring & exams) can help personalize your care plan, which helps you manage your own care. Screening tests can find health problems at the earliest stages, when they are easiest to treat. [facility] follows the current, evidence-based guidelines published by the [facility] Spike Bryce ([facility]) when recommending preventive services for our patients. Because we follow these guidelines, sometimes recommendations change over time as research supports it. (For example, mammograms used to be recommended annually. Even though Medicare will still pay for an annual mammogram, the newer guidelines recommend a mammogram every two years for women of average risk.)  Of course, you and your doctor may decide to screen more often for some diseases, based on your risk and your health status. Preventive services for you include:  - Medicare offers their members a free annual wellness visit, which is time for you and your primary care provider to discuss and plan for your preventive service needs. Take advantage of this benefit every year!  -All adults over the age of 72 should receive the recommended pneumonia vaccines. Current USPSTF guidelines recommend a series of two vaccines for the best pneumonia protection.   -All adults should have a flu vaccine yearly and a tetanus vaccine every 10 years. All adults age 61 and older should receive a shingles vaccine once in their lifetime.    -A bone mass density test is recommended when a woman turns 65 to screen for osteoporosis. This test is only recommended one time, as a screening.  Some providers will use this same test as a disease monitoring tool if you already have osteoporosis. -All adults age 38-68 who are overweight should have a diabetes screening test once every three years.   -Other screening tests and preventive services for persons with diabetes include: an eye exam to screen for diabetic retinopathy, a kidney function test, a foot exam, and stricter control over your cholesterol.   -Cardiovascular screening for adults with routine risk involves an electrocardiogram (ECG) at intervals determined by your doctor.   -Colorectal cancer screenings should be done for adults age 54-65 with no increased risk factors for colorectal cancer. There are a number of acceptable methods of screening for this type of cancer. Each test has its own benefits and drawbacks. Discuss with your doctor what is most appropriate for you during your annual wellness visit. The different tests include: colonoscopy (considered the best screening method), a fecal occult blood test, a fecal DNA test, and sigmoidoscopy. -Breast cancer screenings are recommended every other year for women of normal risk, age 54-69.  -Cervical cancer screenings for women over age 72 are only recommended with certain risk factors.   -All adults born between [facility] should be screened once for Hepatitis C. Here is a list of your current Health Maintenance items (your personalized list of preventive services) with a due date:  Health Maintenance Due   Topic Date Due    Hepatitis C Test  1950    DTaP/Tdap/Td  (1 - Tdap) 03/06/1969    Shingles Vaccine (1 of 2) 03/06/1998    Glaucoma Screening   03/06/2013    Bone Mineral Density   03/06/2013    Pneumococcal Vaccine (2 of 2 - PPSV23) 01/06/2017    Annual Well Visit  09/06/2018    Flu Vaccine  08/01/2019       Skin cancer prevention    It's possible to prevent skin cancer.   Current recommendations include avoiding sun in the peak hours of the day, wearing hats and long sleeves in the sun and using sun blocks regularly. Patient with sun damaged skin or previously skin cancer should get yearly skin exams. Also, there is very good evidence that Vitamin B3 in the form of Nicotinamide 500mg twice a day prevents non melanoma skin cancers and lowers the rate of those cancers 20-30% over time. Nicotinamide (which is the same as Niacinamide) is available on line and is relatively inexpensive. It is also found in many B complex vitamins. No prescription is necessary for Nicotinamide. The goal blood pressure for most patients is 140/90. The whole point of treating blood pressure is to prevent strokes, heart attacks and kidney damage. Persistently elevated blood pressure damages blood vessels and can lead to catastrophic heart problems and strokes. Recommendations for Hypertension (elevated blood pressure):    · Purchase a blood pressure cuff that goes around your upper arm and check blood pressure at least 3 times per week. Write down your numbers for review. Check blood pressure in the morning and evening. Rest for 5 minutes with feet elevated and arm resting on a table (at mid-chest level) when checking blood pressure    · Exercise 30-60 minutes most days of the week, preferably with a mix of cardiovascular and strength training. Exercise can improve blood pressure, even if you do not lose weight! · Limit alcohol intake to less than 3-4 drinks per week. · Avoid tobacco products    · Avoid illegal drugs especially amphetamines  · DASH diet - includes fruits, vegetables, fiber, and less than 2000 mg sodium (salt) daily. · Try to eat a low sugar diet. Sugar worsens diabetes and activates kidney hormones that raise blood pressure.    · Avoid non-steroidal inflammatory medications (NSAIDS) such as ibuprofen, advil, motrin, aleve, and naproxyn as these may increase blood pressure if used long-term    · Avoid OTC decongestants such as pseudopherine, phenylephrine, Afrin  · Take your blood pressure medicine (and aspirin if prescribed) religiously        Diabetes:  Blood sugar goals:  Hemoglobin A1c under 7  Fasting blood sugar   Blood sugar 2 hours after a meal under 180, 4 hours after a meal under 120  No hypoglycemia (sugars under 70 and symptomatic low sugars)    Blood sugar control with diet and exercise:  Exercise 45 minutes per day. This makes your insulin work better. It also allows insulin levels to fall helping with weight loss. Every night, try to fast from your evening meal to breakfast (at least 12 hours) without eating anything. This uses stored energy in your liver and makes insulin work better. Avoid simples sugars such as table sugar in drinks (sodas, lemonade, sweet tea, wine), desserts, candy. Also avoid fruit juices and high fructose corn syrup. Avoid frequent consumption of fruit especially grapes and bananas. A single serving of fruit daily is all you should have. Finally, drink less milk which has milk sugar in it. Control your starch intake. Men should have 3-4 carb portions per meal.  Women should have 2-3 carb portions per meal.  A carb serving is the equivalent of a slice of bread. Control your blood pressure and cholesterol. These problems are common in diabetes. AND, don't smoke. All of these problems contribute to heart disease and stroke risk. Get a yearly eye exam and flu shot. Make sure your vaccines are up to date particularly the pneumonia vaccines. Inspect your feet daily. Wear comfortable protective shoes and clean socks. Seek medical care if there are sore, calluses or numbness and burning of your feet. See your doctor at least every 6 months if you are on oral medicines or more often if the diabetic control is not at goal or if you are on insulin. Take your medicines religiously.

## 2019-09-05 NOTE — PROGRESS NOTES
Cooper Anderson is a 68 y.o. female      Chief Complaint   Patient presents with   [facility] Annual Wellness Visit         Other     Need cataract form filled out           1. Have you been to the ER, urgent care clinic since your last visit? Hospitalized since your last visit? No      2. Have you seen or consulted any other health care providers outside of the [address] [place] [address] since your last visit? Include any pap smears or colon screening.   No

## 2019-09-19 LAB
ALBUMIN SERPL-MCNC: 5.3 G/DL (ref 3.5–4.8)
ALBUMIN/CREAT UR: <13 MG/G CREAT (ref 0–30)
ALP SERPL-CCNC: 89 IU/L (ref 39–117)
ALT SERPL-CCNC: 16 IU/L (ref 0–32)
AST SERPL-CCNC: 31 IU/L (ref 0–40)
BILIRUB DIRECT SERPL-MCNC: 0.09 MG/DL (ref 0–0.4)
BILIRUB SERPL-MCNC: 0.4 MG/DL (ref 0–1.2)
BUN SERPL-MCNC: 30 MG/DL (ref 8–27)
BUN/CREAT SERPL: 31 (ref 12–28)
CALCIUM SERPL-MCNC: 10.4 MG/DL (ref 8.7–10.3)
CHLORIDE SERPL-SCNC: 102 MMOL/L (ref 96–106)
CHOLEST SERPL-MCNC: 214 MG/DL (ref 100–199)
CO2 SERPL-SCNC: 20 MMOL/L (ref 20–29)
CREAT SERPL-MCNC: 0.98 MG/DL (ref 0.57–1)
CREAT UR-MCNC: 23 MG/DL
GLUCOSE SERPL-MCNC: 89 MG/DL (ref 65–99)
HCV AB S/CO SERPL IA: <0.1 S/CO RATIO (ref 0–0.9)
HDLC SERPL-MCNC: 66 MG/DL
LDLC SERPL CALC-MCNC: 134 MG/DL (ref 0–99)
MICROALBUMIN UR-MCNC: <3 UG/ML
POTASSIUM SERPL-SCNC: 5.1 MMOL/L (ref 3.5–5.2)
PROT SERPL-MCNC: 7.7 G/DL (ref 6–8.5)
SODIUM SERPL-SCNC: 139 MMOL/L (ref 134–144)
TRIGL SERPL-MCNC: 68 MG/DL (ref 0–149)
TSH SERPL DL<=0.005 MIU/L-ACNC: 1.86 UIU/ML (ref 0.45–4.5)
VLDLC SERPL CALC-MCNC: 14 MG/DL (ref 5–40)

## 2019-11-13 ENCOUNTER — HOSPITAL ENCOUNTER (OUTPATIENT)
Dept: MAMMOGRAPHY | Age: 69
Discharge: HOME | End: 2019-11-13
Attending: FAMILY MEDICINE
Payer: MEDICARE

## 2019-11-13 DIAGNOSIS — M94.9 DISORDER OF BONE AND CARTILAGE: ICD-10-CM

## 2019-11-13 DIAGNOSIS — M89.9 DISORDER OF BONE AND CARTILAGE: ICD-10-CM

## 2019-11-13 PROCEDURE — 77080 DXA BONE DENSITY AXIAL: CPT

## 2019-12-09 RX ORDER — GEMFIBROZIL 600 MG/1
TABLET, FILM COATED ORAL
Qty: 180 TAB | Refills: 0 | Status: SHIPPED | OUTPATIENT
Start: 2019-12-09 | End: 2020-02-21

## 2019-12-10 RX ORDER — EZETIMIBE 10 MG/1
TABLET ORAL
Qty: 90 TAB | Refills: 0 | Status: SHIPPED | OUTPATIENT
Start: 2019-12-10 | End: 2020-02-21

## 2019-12-11 ENCOUNTER — OFFICE VISIT (OUTPATIENT)
Dept: FAMILY MEDICINE CLINIC | Age: 69
End: 2019-12-11

## 2019-12-11 VITALS
DIASTOLIC BLOOD PRESSURE: 85 MMHG | HEART RATE: 71 BPM | BODY MASS INDEX: 30.43 KG/M2 | OXYGEN SATURATION: 98 % | RESPIRATION RATE: 16 BRPM | SYSTOLIC BLOOD PRESSURE: 130 MMHG | WEIGHT: 161.2 LBS | TEMPERATURE: 98.5 F | HEIGHT: 61 IN

## 2019-12-11 DIAGNOSIS — M81.0 OSTEOPOROSIS, UNSPECIFIED OSTEOPOROSIS TYPE, UNSPECIFIED PATHOLOGICAL FRACTURE PRESENCE: Primary | ICD-10-CM

## 2019-12-11 DIAGNOSIS — G47.00 INSOMNIA, UNSPECIFIED TYPE: ICD-10-CM

## 2019-12-11 RX ORDER — EVOLOCUMAB 140 MG/ML
140 INJECTION, SOLUTION SUBCUTANEOUS EVERY 2 WEEKS
Refills: 6 | COMMUNITY
Start: 2019-10-30

## 2019-12-11 RX ORDER — ZOLPIDEM TARTRATE 10 MG/1
10 TABLET ORAL
Qty: 30 TAB | Refills: 2 | Status: SHIPPED | OUTPATIENT
Start: 2019-12-11 | End: 2020-03-11 | Stop reason: SDUPTHER

## 2019-12-11 RX ORDER — ALENDRONATE SODIUM 70 MG/1
70 TABLET ORAL
Qty: 13 TAB | Refills: 3 | Status: SHIPPED | OUTPATIENT
Start: 2019-12-11 | End: 2020-12-05 | Stop reason: SDUPTHER

## 2019-12-11 NOTE — PROGRESS NOTES
Identified pt with two pt identifiers(name and ). Reviewed record in preparation for visit and have obtained necessary documentation. Chief Complaint   Patient presents with    Medication Refill     Ambien    Other     BMD results from [address] Maintenance Due   Topic    DTaP/Tdap/Td series (1 - Tdap)    GLAUCOMA SCREENING Q2Y     Pneumococcal 65+ years (2 of 2 - PPSV23)       Coordination of Care Questionnaire:  :   1) Have you been to an emergency room, urgent care, or hospitalized since your last visit? If yes, where when, and reason for visit? Yes  [facility]       2. Have seen or consulted any other health care provider since your last visit? If yes, where when, and reason for visit?  No

## 2019-12-11 NOTE — PATIENT INSTRUCTIONS
Deciding About Bisphosphonate Medicine for Osteoporosis How can you decide about taking bisphosphonate medicine for osteoporosis? This information is right for you if you are a woman who has been through menopause. If that does not describe you, or if you're not sure, talk with your doctor about this decision. What is osteoporosis? Osteoporosis is a disease that affects your bones. It means you have bones that are thin and brittle and have lots of holes inside them like a sponge. This makes them easy to break. It also increases your risk for spine and hip fractures. These fractures may make it hard for you to live on your own. Bisphosphonates are the most common medicines used to prevent bone loss. Most of the time, you take them as pills. They slow the way bone dissolves and is absorbed by your body. They can increase bone thickness and strength. What are key points about this decision? · If you are at a higher risk of having a fracture, taking bisphosphonates is more likely to help you prevent a fracture. If your risk of a fracture is lower, it's less likely that these medicines will help you. · Bisphosphonates can cause problems with the jaw or thigh bone. But most women do not have these side effects. · Whether you take medicine or not, healthy habits can help protect your bones. Get enough calcium and vitamin D. Get regular weight-bearing exercise. Cut back on alcohol. And if you smoke, quit. Who is helped the most by bisphosphonates? For women who have been through menopause: · If you have osteoporosis (your T-score is -2.5 or less) or you have had a fracture, taking bisphosphonates lowers your risk of having a fracture. · If you haven't had a fracture and you have low bone density (your T-score is between -1.0 and -2.5, sometimes called osteopenia), taking bisphosphonates might lower your risk of having a fracture. This evidence is not as strong. What are the side effects of bisphosphonates? These medicines can have side effects, such as heartburn and belly pain. Certain bone problems have also been reported in women taking bisphosphonates. Out of 1,000 people, about 1 person has a bone side effect during a year of taking bisphosphonates. That means 999 out of 1,000 people do not have a bone side effect. These bone side effects include problems with the jaw bone (called osteonecrosis). They also might include a certain kind of fracture of the thigh bone (called an atypical fracture), but more research is needed to find out if taking bisphosphonates is a cause of these fractures. Your decision Thinking about the facts and your feelings can help you make a decision that is right for you. Be sure you understand the benefits and risks of your options, and think about what else you need to do before you make the decision. Where can you learn more? Go to http://jeniffer-rogelio.info/. Enter T780 in the search box to learn more about \"Deciding About Bisphosphonate Medicine for Osteoporosis. \" Current as of: November 7, 2018 Content Version: 12.2 © 5792-4862 [employer]. Care instructions adapted under license by [employer] (which disclaims liability or warranty for this information). If you have questions about a medical condition or this instruction, always ask your healthcare professional. [address] any warranty or liability for your use of this information. Osteoporosis: Care Instructions Your Care Instructions Osteoporosis causes bones to become thin and weak. It is much more common in women than in men. Osteoporosis may be very advanced before you know you have it. Sometimes the first sign is a broken bone in the hip, spine, or wrist or sudden pain in your middle or lower back. Follow-up care is a key part of your treatment and safety.  Be sure to make and go to all appointments, and call your doctor if you are having problems. It's also a good idea to know your test results and keep a list of the medicines you take. How can you care for yourself at home? · Your doctor may prescribe a bisphosphonate, such as risedronate (Actonel) or alendronate (Fosamax), for osteoporosis. If you are taking one of these medicines by mouth: 
? Take your medicine with a full glass of water when you first get up in the morning. ? Do not lie down, eat, drink a beverage, or take any other medicine for at least 30 minutes after taking the drug. This helps prevent stomach problems. ? Do not take your medicine late in the day if you forgot to take it in the morning. Skip it, and take the usual dose the next morning. ? If you have side effects, tell your doctor. He or she may prescribe another medicine. · Get enough calcium and vitamin D. The [place] [employer] recommends adults younger than age 46 need 1,000 mg of calcium and 600 IU of vitamin D each day. Women ages 46 to 79 need 1,200 mg of calcium and 600 IU of vitamin D each day. Men ages 46 to 79 need 1,000 mg of calcium and 600 IU of vitamin D each day. Adults 71 and older need 1,200 mg of calcium and 800 IU of vitamin D each day. ? Eat foods rich in calcium, like yogurt, cheese, milk, and dark green vegetables. This is a good way to get the calcium you need. You can get vitamin D from eggs, fatty fish, cereal, and milk. ? Talk to your doctor about taking a calcium plus vitamin D supplement. Be careful, though. Adults ages 23 to 48 should not get more than 2,500 mg of calcium and 4,000 IU of vitamin D each day, whether it is from supplements and/or food. Adults ages 46 and older should not get more than 2,000 mg of calcium and 4,000 IU of vitamin D each day from supplements and/or food. · Limit alcohol to 2 drinks a day for men and 1 drink a day for women. Too much alcohol can cause health problems. · Do not smoke. Smoking puts you at a much higher risk for osteoporosis. If you need help quitting, talk to your doctor about stop-smoking programs and medicines. These can increase your chances of quitting for good. · Get regular bone-building exercise. Weight-bearing and resistance exercises keep bones healthy by working the muscles and bones against gravity. Start out at an exercise level that feels right for you. Add a little at a time until you can do the following: ? Do 30 minutes of weight-bearing exercise on most days of the week. Walking, jogging, stair climbing, and dancing are good choices. ? Do resistance exercises with weights or elastic bands 2 to 3 days a week. · Reduce your risk of falls: 
? Wear supportive shoes with low heels and nonslip soles. ? Use a cane or walker, if you need it. Use shower chairs and bath benches. Put in handrails on stairways, around your shower or tub area, and near the toilet. ? Keep stairs, porches, and walkways well lit. Use night-lights. ? Remove throw rugs and other objects that are in the way. ? Avoid icy, wet, or slippery surfaces. ? Keep a cordless phone and a flashlight with new batteries by your bed. When should you call for help? Watch closely for changes in your health, and be sure to contact your doctor if you have any problems. Where can you learn more? Go to http://jeniffer-rogelio.info/. Enter K100 in the search box to learn more about \"Osteoporosis: Care Instructions. \" Current as of: November 7, 2018 Content Version: 12.2 © 6934-3840 [employer]. Care instructions adapted under license by [employer] (which disclaims liability or warranty for this information). If you have questions about a medical condition or this instruction, always ask your healthcare professional. [address] any warranty or liability for your use of this information.

## 2019-12-11 NOTE — PROGRESS NOTES
Assessment and Plan    1. Insomnia, unspecified type  Refill Ambien  - zolpidem (AMBIEN) 10 mg tablet; Take 1 Tab by mouth nightly as needed for Sleep. Max Daily Amount: 10 mg. Dispense: 30 Tab; Refill: 2    2. Osteoporosis, unspecified osteoporosis type, unspecified pathological fracture presence  DEXA shows she is osteoporotic  Discussed fall prevention, Vitamin D and Calcium, weight bearing exercise and start bisphosphonate. - alendronate (FOSAMAX) 70 mg tablet; Take 1 Tab by mouth every seven (7) days. Dispense: 13 Tab; Refill: 3    Most of today's 25 minute appointment spent discussing medication to be taken and dx of osteoporosis. Follow-up and Dispositions    · Return in about 3 months (around 3/11/2020) for Medication follow up. Diagnosis and plan discussed with patient who verbillized understanding. History of present Adryan Mao is a 68 y.o. female presenting for Medication Refill (Ambien) and Other (BMD results from [facility])    Needs refill of Ambien  No side effects  No other narcotics or scheduled meds    FU DEXA  Hip has T score of -2.5  Already on Vitamin D    Review of Systems   Gastrointestinal: Negative. Genitourinary: Negative. Musculoskeletal: Negative for back pain, falls, joint pain, myalgias and neck pain. Psychiatric/Behavioral: The patient does not have insomnia.           Past Medical History:   Diagnosis Date    Aneurysm [facility])     splenic artery aneurysm - no longer needs to be followed up - will never be a problem    Arthritis     back    Chronic pain     back    CKD (chronic kidney disease) stage 2, GFR 60-89 ml/min     Hyperlipidemia     Hypertension     Ill-defined condition     walks with a cane    Ill-defined condition     cardiac calcium test - some build up/stress test is \"fine\" per pt    Lumbar stenosis     Migraine     Other ill-defined conditions(799.89)     wheezing with allergies    Other ill-defined conditions(799.89)     GI bleed - stomach - NSAID use    Overactive bladder     PUD (peptic ulcer disease)     Stenosis of lumbosacral spine     Thromboembolus [facility])     PE     Past Surgical History:   Procedure Laterality Date    COLONOSCOPY N/A 10/1/2018    COL[facility] by Scherrie Najjar[facility] at [facility] ENDOSCOPY    HX HYSTERECTOMY      HX ORTHOPAEDIC  2009    spinal fusion/has not had a lumbar laminectomy    HX ORTHOPAEDIC Bilateral     bunionectomy - bilateral once and unilateral once    HX ORTHOPAEDIC Bilateral     carpal tunnel release    HX TONSILLECTOMY      T & A    HX UROLOGICAL  2012    bladder repair     Family History   Problem Relation Age of Onset    Cancer Father         bladder    Cancer Brother         BCCA    Stroke Mother     Cancer Maternal Uncle         leukemia    Breast Cancer Paternal Aunt     Stroke Maternal Grandmother     Colon Cancer Paternal Grandfather     Cancer Maternal Uncle         throat     Social History     Socioeconomic History    Marital status:      Spouse name: Not on file    Number of children: Not on file    Years of education: Not on file    Highest education level: Not on file   Occupational History    Not on file   Social Needs    Financial resource strain: Not on file    Food insecurity:     Worry: Not on file     Inability: Not on file    Transportation needs:     Medical: Not on file     Non-medical: Not on file   Tobacco Use    Smoking status: Never Smoker    Smokeless tobacco: Never Used   Substance and Sexual Activity    Alcohol use: Yes     Comment: very, very rare    Drug use: No    Sexual activity: Yes     Partners: Male   Lifestyle    Physical activity:     Days per week: Not on file     Minutes per session: Not on file    Stress: Not on file   Relationships    Social connections:     Talks on phone: Not on file     Gets together: Not on file     Attends Shinto service: Not on file     Active member of club or organization: Not on file Attends meetings of clubs or organizations: Not on file     Relationship status: Not on file    Intimate partner violence:     Fear of current or ex partner: Not on file     Emotionally abused: Not on file     Physically abused: Not on file     Forced sexual activity: Not on file   Other Topics Concern    Not on file   Social History Narrative    Not on file         Prior to Admission medications    Medication Sig Start Date End Date Taking? Authorizing Provider   MAXIMINO SURECLICK pen injection 578 mg Once every 2 weeks. 10/30/19  Yes Provider, Historical   zolpidem (AMBIEN) 10 mg tablet Take 1 Tab by mouth nightly as needed for Sleep. Max Daily Amount: 10 mg. 12/11/19  Yes Júinor Urbano MD   alendronate (FOSAMAX) 70 mg tablet Take 1 Tab by mouth every seven (7) days. 12/11/19  Yes Júnior Urbano MD   ezetimibe (ZETIA) 10 mg tablet TAKE 1 TABLET BY MOUTH DAILY 12/10/19  Yes Netta Guzmán MD   gemfibrozil (LOPID) 600 mg tablet TAKE 1 TABLET BY MOUTH TWICE DAILY 12/9/19  Yes Netta Guzmán MD   metoprolol tartrate (LOPRESSOR) 25 mg tablet TAKE 1 TABLET BY MOUTH TWICE DAILY 10/15/19  Yes Júnior Urbano MD   montelukast (SINGULAIR) 10 mg tablet Take 1 Tab by mouth daily. 9/5/19  Yes Júnior Urbano MD   eletriptan (RELPAX) 20 mg tablet Take 20 mg by mouth once as needed. may repeat in 2 hours if necessary   Yes Provider, Historical   KRILL OIL PO Take  by mouth. Yes Provider, Historical   multivitamin (MULTIPLE VITAMINS PO) Take  by mouth. Yes Provider, Historical   Lactobacillus acidophilus (ACIDOPHILUS PO) Take  by mouth. Yes Provider, Historical   topiramate (TOPAMAX) 200 mg tablet Take 200 mg by mouth two (2) times a day. Yes Provider, Historical   cholecalciferol, vitamin D3, 2,000 unit tab Take 2,000 Units by mouth daily. Yes Provider, Historical   turmeric (CURCUMIN) Take 600 mg by mouth daily.    Yes Provider, Historical   acetaminophen (TYLENOL EXTRA STRENGTH PO) Take by mouth. Takes 2 po as needed for leg or back pain. Yes Provider, Historical   cetirizine (ZYRTEC) 10 mg tablet Take 10 mg by mouth daily. Yes Provider, Historical   varicella-zoster recombinant, PF, (SHINGRIX, PF,) 50 mcg/0.5 mL susr injection 0.5mL by IntraMUSCular route once now and then repeat in 2-6 months 9/5/19   Kristie Leigh MD   zolpidem (AMBIEN) 10 mg tablet Take 1 Tab by mouth nightly as needed for Sleep. Max Daily Amount: 10 mg. 9/5/19 12/11/19  Kristie Leigh MD        Allergies   Allergen Reactions    Macrodantin [Nitrofurantoin Macrocrystalline] Other (comments)     Low WBC    Nitrofurantoin Other (comments)     LOW WBC - 2000    Nsaids (Non-Steroidal Anti-Inflammatory Drug) Other (comments)     GI bleed.  Other Plant, Animal, Environmental Other (comments)     Mold, dust, cats, dog allergies. ENVIRONMENTAL ALLERGIES.  Tolmetin Unknown (comments)    Toradol [Ketorolac Tromethamine] Rash       Vitals:    12/11/19 1501   BP: 130/85   Pulse: 71   Resp: 16   Temp: 98.5 °F (36.9 °C)   TempSrc: Oral   SpO2: 98%   Weight: 161 lb 3.2 oz (73.1 kg)   Height: 5' 1\" (1.549 m)     Body mass index is 30.46 kg/m². Objective  Physical Exam    GENERAL: well developed; well nourished; well groomed; no apparent distress  PSYCHIATRIC: Orientation: alert and oriented x 3; ; Mood/Affect: normal mood and affect;  Speech Pattern: normal;  Thought Processes: normal rate and content of thoughts; good abstract reasoning;  Associations: intact thought associations; Abnormal Thoughts: no hallucinations, delusions, obsessions, or suicidal/homicidal ideation;  Insight/Judgment: good insight; good judgment;

## 2020-02-11 ENCOUNTER — OFFICE VISIT (OUTPATIENT)
Dept: FAMILY MEDICINE CLINIC | Age: 70
End: 2020-02-11

## 2020-02-11 VITALS
BODY MASS INDEX: 29.83 KG/M2 | WEIGHT: 158 LBS | DIASTOLIC BLOOD PRESSURE: 73 MMHG | HEIGHT: 61 IN | OXYGEN SATURATION: 98 % | HEART RATE: 80 BPM | TEMPERATURE: 98.3 F | RESPIRATION RATE: 16 BRPM | SYSTOLIC BLOOD PRESSURE: 102 MMHG

## 2020-02-11 DIAGNOSIS — J11.1 INFLUENZA: Primary | ICD-10-CM

## 2020-02-11 DIAGNOSIS — R68.89 FLU-LIKE SYMPTOMS: ICD-10-CM

## 2020-02-11 LAB
FLUAV+FLUBV AG NOSE QL IA.RAPID: NEGATIVE POS/NEG
FLUAV+FLUBV AG NOSE QL IA.RAPID: POSITIVE POS/NEG
VALID INTERNAL CONTROL?: YES

## 2020-02-11 RX ORDER — OSELTAMIVIR PHOSPHATE 75 MG/1
75 CAPSULE ORAL 2 TIMES DAILY
Qty: 10 CAP | Refills: 0 | Status: SHIPPED | OUTPATIENT
Start: 2020-02-11 | End: 2020-02-16

## 2020-02-11 NOTE — PROGRESS NOTES
Harry Berg  68 y.o. female  1950  [id number]    [facility] MEDICINE  Progress Note     Encounter Date: 2/11/2020    Assessment and Plan:     Encounter Diagnoses     ICD-10-CM ICD-9-CM   1. Influenza J11.1 487.1   2. Flu-like symptoms R68.89 780.99       1. Influenza  Flu A positive. - oseltamivir (TAMIFLU) 75 mg capsule; Take 1 Cap by mouth two (2) times a day for 5 days. Dispense: 10 Cap; Refill: 0    2. Flu-like symptoms  - AMB POC ZURDO INFLUENZA A/B TEST      I have discussed the diagnosis with the patient and the intended plan as seen in the above orders. she has expressed understanding. The patient has received an after-visit summary and questions were answered concerning future plans. I have discussed medication side effects and warnings with the patient as well. Electronically Signed: Lillie Ellis MD    Current Medications after this visit     Current Outpatient Medications   Medication Sig    oseltamivir (TAMIFLU) 75 mg capsule Take 1 Cap by mouth two (2) times a day for 5 days.  REPATHA SURECLICK pen injection 317 mg Once every 2 weeks.  zolpidem (AMBIEN) 10 mg tablet Take 1 Tab by mouth nightly as needed for Sleep. Max Daily Amount: 10 mg.    alendronate (FOSAMAX) 70 mg tablet Take 1 Tab by mouth every seven (7) days.  ezetimibe (ZETIA) 10 mg tablet TAKE 1 TABLET BY MOUTH DAILY    gemfibrozil (LOPID) 600 mg tablet TAKE 1 TABLET BY MOUTH TWICE DAILY    metoprolol tartrate (LOPRESSOR) 25 mg tablet TAKE 1 TABLET BY MOUTH TWICE DAILY    montelukast (SINGULAIR) 10 mg tablet Take 1 Tab by mouth daily.  varicella-zoster recombinant, PF, (SHINGRIX, PF,) 50 mcg/0.5 mL susr injection 0.5mL by IntraMUSCular route once now and then repeat in 2-6 months    eletriptan (RELPAX) 20 mg tablet Take 20 mg by mouth once as needed. may repeat in 2 hours if necessary    KRILL OIL PO Take  by mouth.  multivitamin (MULTIPLE VITAMINS PO) Take  by mouth.     Lactobacillus acidophilus (ACIDOPHILUS PO) Take  by mouth.  topiramate (TOPAMAX) 200 mg tablet Take 200 mg by mouth two (2) times a day.  cholecalciferol, vitamin D3, 2,000 unit tab Take 2,000 Units by mouth daily.  turmeric (CURCUMIN) Take 600 mg by mouth daily.  acetaminophen (TYLENOL EXTRA STRENGTH PO) Take  by mouth. Takes 2 po as needed for leg or back pain.  cetirizine (ZYRTEC) 10 mg tablet Take 10 mg by mouth daily. No current facility-administered medications for this visit. There are no discontinued medications. ~~~~~~~~~~~~~~~~~~~~~~~~~~~~~~~~~~~~~~~~~~~~~~    Chief Complaint   Patient presents with    Flu Like Symptoms     Pt states that for x1 day; pt experienced head congestion, coughing, feeling slightly nausea       History provided by patient  History of Present Illness   Yary Orozco is a 68 y.o. female who presents to clinic today for:    Flu    The history is provided by the patient. This is a new problem. The current episode started yesterday. The problem occurs daily. The problem has not changed ( was diagnosed with flu) since onset. Associated symptoms include congestion (sinus pressure), headaches, sore throat, muscle aches and cough. Pertinent negatives include no chest pain, no sleepiness, no diarrhea ( +nausea), no vomiting, no shortness of breath, no neck pain and no rash. She has tried OTC med (antihistamines, decongestant) for the symptoms. Health Maintenance  Health Maintenance Due   Topic Date Due    DTaP/Tdap/Td series (1 - Tdap) 03/06/1961    GLAUCOMA SCREENING Q2Y  03/06/2013    Pneumococcal 65+ years (2 of 2 - PPSV23) 01/06/2017    Shingrix Vaccine Age 50> (2 of 2) 01/11/2020     Review of Systems   Review of Systems   Constitutional: Negative for chills and fever. HENT: Positive for congestion (sinus pressure) and sore throat. Respiratory: Positive for cough. Negative for shortness of breath.     Cardiovascular: Negative for chest pain.   Gastrointestinal: Negative for abdominal pain, diarrhea ( +nausea), nausea and vomiting. Musculoskeletal: Negative for neck pain. Skin: Negative for itching and rash. Neurological: Positive for headaches. Negative for dizziness. Vitals/Objective:     Vitals:    02/11/20 1008   BP: 102/73   Pulse: 80   Resp: 16   Temp: 98.3 °F (36.8 °C)   TempSrc: Oral   SpO2: 98%   Weight: 158 lb (71.7 kg)   Height: 5' 1\" (1.549 m)     Body mass index is 29.85 kg/m². Wt Readings from Last 3 Encounters:   02/11/20 158 lb (71.7 kg)   12/11/19 161 lb 3.2 oz (73.1 kg)   09/05/19 162 lb (73.5 kg)       Physical Exam  Constitutional:       General: She is not in acute distress. Appearance: Normal appearance. She is well-developed. She is ill-appearing. She is not diaphoretic. HENT:      Head: Normocephalic and atraumatic. Right Ear: Tympanic membrane, ear canal and external ear normal.      Left Ear: Tympanic membrane, ear canal and external ear normal.      Nose: Congestion and rhinorrhea present. Mouth/Throat:      Mouth: Mucous membranes are moist.      Pharynx: Posterior oropharyngeal erythema present. No oropharyngeal exudate. Eyes:      General:         Right eye: No discharge. Left eye: No discharge. Conjunctiva/sclera: Conjunctivae normal.   Cardiovascular:      Rate and Rhythm: Normal rate and regular rhythm. Heart sounds: S1 normal and S2 normal. No murmur. Pulmonary:      Effort: Pulmonary effort is normal.      Breath sounds: Normal breath sounds. No rales. Abdominal:      General: Abdomen is flat. There is no distension. Palpations: There is no mass. Tenderness: There is no abdominal tenderness. There is no guarding or rebound. Hernia: No hernia is present. Musculoskeletal:      Right lower leg: No edema. Left lower leg: No edema. Skin:     General: Skin is warm and dry.    Neurological:      Mental Status: She is alert and oriented to person, place, and time. No results found for this or any previous visit (from the past 24 hour(s)). Disposition     No future appointments. History   Patient's past medical, surgical and family histories were reviewed and updated.     Past Medical History:   Diagnosis Date    Aneurysm [facility])     splenic artery aneurysm - no longer needs to be followed up - will never be a problem    Arthritis     back    Chronic pain     back    CKD (chronic kidney disease) stage 2, GFR 60-89 ml/min     Hyperlipidemia     Hypertension     Ill-defined condition     walks with a cane    Ill-defined condition     cardiac calcium test - some build up/stress test is \"fine\" per pt    Lumbar stenosis     Migraine     Other ill-defined conditions(799.89)     wheezing with allergies    Other ill-defined conditions(799.89)     GI bleed - stomach - NSAID use    Overactive bladder     PUD (peptic ulcer disease)     Stenosis of lumbosacral spine     Thromboembolus ([address]     PE     Past Surgical History:   Procedure Laterality Date    COLONOSCOPY N/A 10/1/2018    COLON[facility]COPY performed by [facility]ina [facility] at [facility] ENDOSCOPY    HX HYSTERECTOMY      HX ORTHOPAEDIC  2009    spinal fusion/has not had a lumbar laminectomy    HX ORTHOPAEDIC Bilateral     bunionectomy - bilateral once and unilateral once    HX ORTHOPAEDIC Bilateral     carpal tunnel release    HX TONSILLECTOMY      T & A    HX UROLOGICAL  2012    bladder repair     Family History   Problem Relation Age of Onset    Cancer Father         bladder    Cancer Brother         BCCA    Stroke Mother     Cancer Maternal Uncle         leukemia    Breast Cancer Paternal Aunt     Stroke Maternal Grandmother     Colon Cancer Paternal Grandfather     Cancer Maternal Uncle         throat     Social History     Tobacco Use    Smoking status: Never Smoker    Smokeless tobacco: Never Used   Substance Use Topics    Alcohol use: Yes     Comment: very, very rare    Drug use: No       Allergies     Allergies   Allergen Reactions    Nitrofurantoin Other (comments)     LOW WBC - 2000    Nsaids (Non-Steroidal Anti-Inflammatory Drug) Other (comments)     GI bleed.  Other Plant, Animal, Environmental Other (comments)     Mold, dust, cats, dog allergies. ENVIRONMENTAL ALLERGIES.     Tolmetin Unknown (comments)    Toradol [Ketorolac Tromethamine] Rash

## 2020-02-11 NOTE — PROGRESS NOTES
Identified pt with two pt identifiers(name and ). Reviewed record in preparation for visit and have obtained necessary documentation. Chief Complaint   Patient presents with    Flu Like Symptoms     Pt states that for x1 day; pt experienced head congestion, coughing, feeling slightly nausea        Health Maintenance Due   Topic    DTaP/Tdap/Td series (1 - Tdap)    GLAUCOMA SCREENING Q2Y     Pneumococcal 65+ years (2 of 2 - PPSV23)    Shingrix Vaccine Age 50> (2 of 2)       Coordination of Care Questionnaire:  :   1) Have you been to an emergency room, urgent care, or hospitalized since your last visit? If yes, where when, and reason for visit? no      2. Have seen or consulted any other health care provider since your last visit? If yes, where when, and reason for visit? no        Patient is accompanied by self I have received verbal consent from [facility] to discuss any/all medical information while they are present in the room.

## 2020-02-11 NOTE — PATIENT INSTRUCTIONS
Influenza (Flu): Care Instructions  Your Care Instructions    Influenza (flu) is an infection in the lungs and breathing passages. It is caused by the influenza virus. There are different strains, or types, of the flu virus from year to year. Unlike the common cold, the flu comes on suddenly and the symptoms, such as a cough, congestion, fever, chills, fatigue, aches, and pains, are more severe. These symptoms may last up to 10 days. Although the flu can make you feel very sick, it usually doesn't cause serious health problems. Home treatment is usually all you need for flu symptoms. But your doctor may prescribe antiviral medicine to prevent other health problems, such as pneumonia, from developing. Older people and those who have a long-term health condition, such as lung disease, are most at risk for having pneumonia or other health problems. Follow-up care is a key part of your treatment and safety. Be sure to make and go to all appointments, and call your doctor if you are having problems. It's also a good idea to know your test results and keep a list of the medicines you take. How can you care for yourself at home? · Get plenty of rest.  · Drink plenty of fluids, enough so that your urine is light yellow or clear like water. If you have kidney, heart, or liver disease and have to limit fluids, talk with your doctor before you increase the amount of fluids you drink. · Take an over-the-counter pain medicine if needed, such as acetaminophen (Tylenol), ibuprofen (Advil, Motrin), or naproxen (Aleve), to relieve fever, headache, and muscle aches. Read and follow all instructions on the label. No one younger than 20 should take aspirin. It has been linked to Reye syndrome, a serious illness. · Do not smoke. Smoking can make the flu worse. If you need help quitting, talk to your doctor about stop-smoking programs and medicines. These can increase your chances of quitting for good.   · Breathe moist air from a hot shower or from a sink filled with hot water to help clear a stuffy nose. · Before you use cough and cold medicines, check the label. These medicines may not be safe for young children or for people with certain health problems. · If the skin around your nose and lips becomes sore, put some petroleum jelly on the area. · To ease coughing:  ? Drink fluids to soothe a scratchy throat. ? Suck on cough drops or plain hard candy. ? Take an over-the-counter cough medicine that contains dextromethorphan to help you get some sleep. Read and follow all instructions on the label. ? Raise your head at night with an extra pillow. This may help you rest if coughing keeps you awake. · Take any prescribed medicine exactly as directed. Call your doctor if you think you are having a problem with your medicine. To avoid spreading the flu  · Wash your hands regularly, and keep your hands away from your face. · Stay home from school, work, and other public places until you are feeling better and your fever has been gone for at least 24 hours. The fever needs to have gone away on its own without the help of medicine. · Ask people living with you to talk to their doctors about preventing the flu. They may get antiviral medicine to keep from getting the flu from you. · To prevent the flu in the future, get a flu vaccine every fall. Encourage people living with you to get the vaccine. · Cover your mouth when you cough or sneeze. When should you call for help? Call 911 anytime you think you may need emergency care.  For example, call if:    · You have severe trouble breathing.    Call your doctor now or seek immediate medical care if:    · You have new or worse trouble breathing.     · You seem to be getting much sicker.     · You feel very sleepy or confused.     · You have a new or higher fever.     · You get a new rash.    Watch closely for changes in your health, and be sure to contact your doctor if:    · You begin to get better and then get worse.     · You are not getting better after 1 week. Where can you learn more? Go to http://jeniffer-rogelio.info/. Enter T483 in the search box to learn more about \"Influenza (Flu): Care Instructions. \"  Current as of: June 9, 2019  Content Version: 12.2  © 4522-1776 [employer]. Care instructions adapted under license by [employer] (which disclaims liability or warranty for this information). If you have questions about a medical condition or this instruction, always ask your healthcare professional. [place] [address] any warranty or liability for your use of this information.

## 2020-02-21 RX ORDER — EZETIMIBE 10 MG/1
TABLET ORAL
Qty: 90 TAB | Refills: 0 | Status: SHIPPED | OUTPATIENT
Start: 2020-02-21 | End: 2020-05-21 | Stop reason: SDUPTHER

## 2020-02-21 RX ORDER — GEMFIBROZIL 600 MG/1
TABLET, FILM COATED ORAL
Qty: 180 TAB | Refills: 0 | Status: SHIPPED | OUTPATIENT
Start: 2020-02-21 | End: 2020-05-20 | Stop reason: SDUPTHER

## 2020-03-11 ENCOUNTER — OFFICE VISIT (OUTPATIENT)
Dept: FAMILY MEDICINE CLINIC | Age: 70
End: 2020-03-11

## 2020-03-11 VITALS
WEIGHT: 156 LBS | DIASTOLIC BLOOD PRESSURE: 81 MMHG | HEIGHT: 61 IN | BODY MASS INDEX: 29.45 KG/M2 | SYSTOLIC BLOOD PRESSURE: 130 MMHG | OXYGEN SATURATION: 96 % | RESPIRATION RATE: 16 BRPM | HEART RATE: 70 BPM | TEMPERATURE: 99.1 F

## 2020-03-11 DIAGNOSIS — I10 ESSENTIAL HYPERTENSION: Primary | ICD-10-CM

## 2020-03-11 DIAGNOSIS — J01.00 SUBACUTE MAXILLARY SINUSITIS: ICD-10-CM

## 2020-03-11 DIAGNOSIS — G47.00 INSOMNIA, UNSPECIFIED TYPE: ICD-10-CM

## 2020-03-11 DIAGNOSIS — J30.1 NON-SEASONAL ALLERGIC RHINITIS DUE TO POLLEN: ICD-10-CM

## 2020-03-11 RX ORDER — ZOLPIDEM TARTRATE 10 MG/1
10 TABLET ORAL
Qty: 30 TAB | Refills: 2 | Status: SHIPPED | OUTPATIENT
Start: 2020-03-11 | End: 2020-05-27 | Stop reason: SDUPTHER

## 2020-03-11 RX ORDER — AZITHROMYCIN 250 MG/1
TABLET, FILM COATED ORAL
Qty: 6 TAB | Refills: 0 | Status: SHIPPED | OUTPATIENT
Start: 2020-03-11 | End: 2020-03-16

## 2020-03-11 NOTE — PROGRESS NOTES
Identified pt with two pt identifiers(name and ). Reviewed record in preparation for visit and have obtained necessary documentation. Chief Complaint   Patient presents with    Hypertension    Medication Refill     [facility] Due   Topic    DTaP/Tdap/Td series (1 - Tdap)    GLAUCOMA SCREENING Q2Y     Pneumococcal 65+ years (2 of 2 - PPSV23)    Shingrix Vaccine Age 50> (2 of 2)       Coordination of Care Questionnaire:  :   1) Have you been to an emergency room, urgent care, or hospitalized since your last visit? If yes, where when, and reason for visit? NO       2. Have seen or consulted any other health care provider since your last visit? If yes, where when, and reason for visit?   No

## 2020-03-11 NOTE — PROGRESS NOTES
Assessment and Plan    1. Essential hypertension  At goal, continue meds    2. Insomnia, unspecified type  Continue zolpidem. Will try to get to 5 mg size in future appointment. - zolpidem (AMBIEN) 10 mg tablet; Take 1 Tab by mouth nightly as needed for Sleep. Max Daily Amount: 10 mg. Dispense: 30 Tab; Refill: 2    3. Subacute maxillary sinusitis  Recent flu and likely subsequent sinus infection. - azithromycin (ZITHROMAX) 250 mg tablet; Take 2 tablets today, then take 1 tablet daily  Dispense: 6 Tab; Refill: 0      Follow-up and Dispositions    · Return in about 3 months (around 6/11/2020) for Medication follow up. Diagnosis and plan discussed with patient who verbillized understanding. History of present Klaudia Vargas is a 65 y.o. female presenting for Hypertension and Medication Refill (Ambien)    Influenza last month  Still having pain right maxillary sinus, thick white drainage. Felt feverish last night  No recent travel or exposure to corona virus. Insomnia  Still on ambien sometime getting by with 5 mg size. Hypertension  At goal  Metoprolol  Sees Cardiology for cholesterol and [facility]  No prior MI's or stents. Review of Systems   HENT: Positive for sinus pain. Negative for congestion and sore throat. Respiratory: Negative. Cardiovascular: Negative. Psychiatric/Behavioral: The patient has insomnia.           Past Medical History:   Diagnosis Date    Aneurysm [facility])     splenic artery aneurysm - no longer needs to be followed up - will never be a problem    Arthritis     back    Chronic pain     back    CKD (chronic kidney disease) stage 2, GFR 60-89 ml/min     Hyperlipidemia     Hypertension     Ill-defined condition     walks with a cane    Ill-defined condition     cardiac calcium test - some build up/stress test is \"fine\" per pt    Lumbar stenosis     Migraine     Other ill-defined conditions(879.89)     wheezing with allergies    Other ill-defined conditions(799.89)     GI bleed - stomach - NSAID use    Overactive bladder     PUD (peptic ulcer disease)     Stenosis of lumbosacral spine     Thromboembolus [facility])     PE     Past Surgical History:   Procedure Laterality Date    COLONOSCOPY N/A 10/1/2018    COLON[facility]COPY performed [facility] ENDOSCOPY    HX HYSTERECTOMY      HX ORTHOPAEDIC  2009    spinal fusion/has not had a lumbar laminectomy    HX ORTHOPAEDIC Bilateral     bunionectomy - bilateral once and unilateral once    HX ORTHOPAEDIC Bilateral     carpal tunnel release    HX TONSILLECTOMY      T & A    HX UROLOGICAL  2012    bladder repair     Family History   Problem Relation Age of Onset    Cancer Father         bladder    Cancer Brother         BCCA    Stroke Mother     Cancer Maternal Uncle         leukemia    Breast Cancer Paternal Aunt     Stroke Maternal Grandmother     Colon Cancer Paternal Grandfather     Cancer Maternal Uncle         throat     Social History     Socioeconomic History    Marital status:      Spouse name: Not on file    Number of children: Not on file    Years of education: Not on file    Highest education level: Not on file   Occupational History    Not on file   Social Needs    Financial resource strain: Not on file    Food insecurity     Worry: Not on file     Inability: Not on file    Transportation needs     Medical: Not on file     Non-medical: Not on file   Tobacco Use    Smoking status: Never Smoker    Smokeless tobacco: Never Used   Substance and Sexual Activity    Alcohol use: Yes     Comment: very, very rare    Drug use: No    Sexual activity: Yes     Partners: Male   Lifestyle    Physical activity     Days per week: Not on file     Minutes per session: Not on file    Stress: Not on file   Relationships    Social connections     Talks on phone: Not on file     Gets together: Not on file     Attends Jewish service: Not on file     Active member of club or organization: Not on file     Attends meetings of clubs or organizations: Not on file     Relationship status: Not on file    Intimate partner violence     Fear of current or ex partner: Not on file     Emotionally abused: Not on file     Physically abused: Not on file     Forced sexual activity: Not on file   Other Topics Concern    Not on file   Social History Narrative    Not on file         Prior to Admission medications    Medication Sig Start Date End Date Taking? Authorizing Provider   zolpidem (AMBIEN) 10 mg tablet Take 1 Tab by mouth nightly as needed for Sleep. Max Daily Amount: 10 mg. 3/11/20  Yes Conner Alicea MD   azithromycin [facility]) 250 mg tablet Take 2 tablets today, then take 1 tablet daily 3/11/20 3/16/20 Yes Conner Alicea MD   ezetimibe (ZETIA) 10 mg tablet TAKE 1 TABLET BY MOUTH DAILY 2/21/20  Yes Joni Masters MD   gemfibroziL (LOPID) 600 mg tablet TAKE 1 TABLET BY MOUTH TWICE DAILY 2/21/20  Yes Joni Masters MD   metoprolol tartrate (LOPRESSOR) 25 mg tablet TAKE 1 TABLET BY MOUTH TWICE DAILY 2/20/20  Yes Gail Guerra MD REPATHA SURECLICK pen injection 897 mg Once every 2 weeks. 10/30/19  Yes Provider, Historical   alendronate (FOSAMAX) 70 mg tablet Take 1 Tab by mouth every seven (7) days. 12/11/19  Yes Conner Alicea MD   montelukast (SINGULAIR) 10 mg tablet Take 1 Tab by mouth daily. 9/5/19  Yes Conner Alicea MD   eletriptan (RELPAX) 20 mg tablet Take 20 mg by mouth once as needed. may repeat in 2 hours if necessary   Yes Provider, Historical   KRILL OIL PO Take  by mouth. Yes Provider, Historical   multivitamin (MULTIPLE VITAMINS PO) Take  by mouth. Yes Provider, Historical   Lactobacillus acidophilus (ACIDOPHILUS PO) Take  by mouth. Yes Provider, Historical   topiramate (TOPAMAX) 200 mg tablet Take 200 mg by mouth two (2) times a day.    Yes Provider, Historical   cholecalciferol, vitamin D3, 2,000 unit tab Take 2,000 Units by mouth daily. Yes Provider, Historical   turmeric (CURCUMIN) Take 600 mg by mouth daily. Yes Provider, Historical   acetaminophen (TYLENOL EXTRA STRENGTH PO) Take  by mouth. Takes 2 po as needed for leg or back pain. Yes Provider, Historical   cetirizine (ZYRTEC) 10 mg tablet Take 10 mg by mouth daily. Yes Provider, Historical   zolpidem (AMBIEN) 10 mg tablet Take 1 Tab by mouth nightly as needed for Sleep. Max Daily Amount: 10 mg. 12/11/19 3/11/20  Lonny Clarke MD   varicella-zoster recombinant, , [facility], ,) 50 mcg/0.5 mL susr injection 0.5mL by IntraMUSCular route once now and then repeat in 2-6 months 9/5/19 3/11/20  Lonny Clarke MD        Allergies   Allergen Reactions    Nitrofurantoin Other (comments)     LOW WBC - 2000    Nsaids (Non-Steroidal Anti-Inflammatory Drug) Other (comments)     GI bleed.  Other Plant, Animal, Environmental Other (comments)     Mold, dust, cats, dog allergies. ENVIRONMENTAL ALLERGIES.  Tolmetin Unknown (comments)    Toradol [Ketorolac Tromethamine] Rash       Vitals:    03/11/20 1048   BP: 130/81   Pulse: 70   Resp: 16   Temp: 99.1 °F (37.3 °C)   TempSrc: Oral   SpO2: 96%   Weight: 156 lb (70.8 kg)   Height: 5' 1\" (1.549 m)     Body mass index is 29.48 kg/m². Objective  Physical Exam  Constitutional:       General: She is not in acute distress. Appearance: She is well-developed. She is not diaphoretic. HENT:      Head: Normocephalic. Right Ear: External ear normal.      Left Ear: External ear normal.      Nose: Nose normal.   Eyes:      General:         Right eye: No discharge. Left eye: No discharge. Conjunctiva/sclera: Conjunctivae normal.   Neck:      Musculoskeletal: Neck supple. Thyroid: No thyromegaly. Cardiovascular:      Rate and Rhythm: Normal rate and regular rhythm. Heart sounds: Normal heart sounds. No murmur. No friction rub. No gallop.     Pulmonary:      Effort: Pulmonary effort is normal. No respiratory distress. Breath sounds: Normal breath sounds. No wheezing or rales. Lymphadenopathy:      Cervical: No cervical adenopathy. Neurological:      Mental Status: She is alert and oriented to person, place, and time. Psychiatric:         Behavior: Behavior normal.         Thought Content:  Thought content normal.         Judgment: Judgment normal.

## 2020-03-12 RX ORDER — MONTELUKAST SODIUM 10 MG/1
TABLET ORAL
Qty: 90 TAB | Refills: 1 | Status: SHIPPED | OUTPATIENT
Start: 2020-03-12 | End: 2020-10-12

## 2020-05-20 RX ORDER — GEMFIBROZIL 600 MG/1
TABLET, FILM COATED ORAL
Qty: 180 TAB | Refills: 0 | Status: SHIPPED | OUTPATIENT
Start: 2020-05-20 | End: 2020-09-30 | Stop reason: SDUPTHER

## 2020-05-21 RX ORDER — EZETIMIBE 10 MG/1
TABLET ORAL
Qty: 90 TAB | Refills: 0 | Status: SHIPPED | OUTPATIENT
Start: 2020-05-21 | End: 2020-09-30 | Stop reason: SDUPTHER

## 2020-05-27 ENCOUNTER — VIRTUAL VISIT (OUTPATIENT)
Dept: FAMILY MEDICINE CLINIC | Age: 70
End: 2020-05-27

## 2020-05-27 VITALS — BODY MASS INDEX: 29.07 KG/M2 | WEIGHT: 154 LBS | HEIGHT: 61 IN

## 2020-05-27 DIAGNOSIS — G47.00 INSOMNIA, UNSPECIFIED TYPE: ICD-10-CM

## 2020-05-27 RX ORDER — ZOLPIDEM TARTRATE 10 MG/1
10 TABLET ORAL
Qty: 30 TAB | Refills: 2 | Status: SHIPPED | OUTPATIENT
Start: 2020-05-27 | End: 2020-10-12 | Stop reason: SDUPTHER

## 2020-05-27 NOTE — PROGRESS NOTES
Becka Carmona is a 65 y.o. female      Chief Complaint   Patient presents with    Medication Evaluation     Ambien         1. Have you been to the ER, urgent care clinic since your last visit? Hospitalized since your last visit? no      2. Have you seen or consulted any other health care providers outside of the [address] [place] [address] since your last visit? Include any pap smears or colon screening.   no

## 2020-05-27 NOTE — PROGRESS NOTES
Alen Alston is a 65 y.o. female evaluated via telephone on 5/27/2020. Consent:  She and/or health care decision maker is aware that she may receive a bill for this telephone service, depending on her insurance coverage, and has provided verbal consent to proceed: Yes      Documentation:  I communicated with the patient and/or health care decision maker about insomnia. Details of this discussion including any medical advice provided:     More stress due to covid. Sometimes still taking 10 mg size instead of 1/2 pill  Otherwise doing ok  Able to get groceries and meds. But remains anxious    Assessment and Plan:    1. Insomnia, unspecified type  - zolpidem (AMBIEN) 10 mg tablet; Take 1 Tab by mouth nightly as needed for Sleep. Max Daily Amount: 10 mg. Dispense: 30 Tab; Refill: 2  Stressors discussed. Follow-up and Dispositions    · Return in about 3 months (around 8/27/2020) for Medication follow up. I affirm this is a Patient Initiated Episode with a Patient who has not had a related appointment within my department in the past 7 days or scheduled within the next 24 hours. Total Time: minutes: 11-20 minutes    Note: not billable if this call serves to triage the patient into an appointment for the relevant concern    The patient was at home and I was in office for this phone visit.     Keina Collazo MD

## 2020-07-17 ENCOUNTER — OFFICE VISIT (OUTPATIENT)
Dept: NEUROLOGY | Age: 70
End: 2020-07-17

## 2020-07-17 VITALS — TEMPERATURE: 97.2 F

## 2020-07-29 ENCOUNTER — HOSPITAL ENCOUNTER (OUTPATIENT)
Dept: MAMMOGRAPHY | Age: 70
Discharge: HOME | End: 2020-07-29
Attending: FAMILY MEDICINE
Payer: MEDICARE

## 2020-07-29 DIAGNOSIS — Z12.31 VISIT FOR SCREENING MAMMOGRAM: ICD-10-CM

## 2020-07-29 PROCEDURE — 77067 SCR MAMMO BI INCL CAD: CPT

## 2020-10-12 ENCOUNTER — OFFICE VISIT (OUTPATIENT)
Dept: FAMILY MEDICINE CLINIC | Age: 70
End: 2020-10-12
Payer: MEDICARE

## 2020-10-12 VITALS
DIASTOLIC BLOOD PRESSURE: 80 MMHG | OXYGEN SATURATION: 93 % | HEART RATE: 79 BPM | WEIGHT: 160 LBS | SYSTOLIC BLOOD PRESSURE: 126 MMHG | BODY MASS INDEX: 30.21 KG/M2 | TEMPERATURE: 98.2 F | RESPIRATION RATE: 16 BRPM | HEIGHT: 61 IN

## 2020-10-12 DIAGNOSIS — E74.39 GLUCOSE INTOLERANCE: ICD-10-CM

## 2020-10-12 DIAGNOSIS — D64.9 ANEMIA, UNSPECIFIED TYPE: ICD-10-CM

## 2020-10-12 DIAGNOSIS — M81.0 OSTEOPOROSIS, UNSPECIFIED OSTEOPOROSIS TYPE, UNSPECIFIED PATHOLOGICAL FRACTURE PRESENCE: ICD-10-CM

## 2020-10-12 DIAGNOSIS — I10 ESSENTIAL HYPERTENSION: Primary | ICD-10-CM

## 2020-10-12 DIAGNOSIS — E78.5 HYPERLIPIDEMIA, UNSPECIFIED HYPERLIPIDEMIA TYPE: ICD-10-CM

## 2020-10-12 DIAGNOSIS — Z23 NEEDS FLU SHOT: ICD-10-CM

## 2020-10-12 DIAGNOSIS — G47.00 INSOMNIA, UNSPECIFIED TYPE: ICD-10-CM

## 2020-10-12 PROCEDURE — 90694 VACC AIIV4 NO PRSRV 0.5ML IM: CPT | Performed by: FAMILY MEDICINE

## 2020-10-12 PROCEDURE — 99214 OFFICE O/P EST MOD 30 MIN: CPT | Performed by: FAMILY MEDICINE

## 2020-10-12 PROCEDURE — G8417 CALC BMI ABV UP PARAM F/U: HCPCS | Performed by: FAMILY MEDICINE

## 2020-10-12 PROCEDURE — G0008 ADMIN INFLUENZA VIRUS VAC: HCPCS | Performed by: FAMILY MEDICINE

## 2020-10-12 PROCEDURE — 3017F COLORECTAL CA SCREEN DOC REV: CPT | Performed by: FAMILY MEDICINE

## 2020-10-12 PROCEDURE — G9899 SCRN MAM PERF RSLTS DOC: HCPCS | Performed by: FAMILY MEDICINE

## 2020-10-12 PROCEDURE — G8536 NO DOC ELDER MAL SCRN: HCPCS | Performed by: FAMILY MEDICINE

## 2020-10-12 PROCEDURE — 1101F PT FALLS ASSESS-DOCD LE1/YR: CPT | Performed by: FAMILY MEDICINE

## 2020-10-12 PROCEDURE — G8752 SYS BP LESS 140: HCPCS | Performed by: FAMILY MEDICINE

## 2020-10-12 PROCEDURE — G8510 SCR DEP NEG, NO PLAN REQD: HCPCS | Performed by: FAMILY MEDICINE

## 2020-10-12 PROCEDURE — G8754 DIAS BP LESS 90: HCPCS | Performed by: FAMILY MEDICINE

## 2020-10-12 PROCEDURE — G8427 DOCREV CUR MEDS BY ELIG CLIN: HCPCS | Performed by: FAMILY MEDICINE

## 2020-10-12 PROCEDURE — 1090F PRES/ABSN URINE INCON ASSESS: CPT | Performed by: FAMILY MEDICINE

## 2020-10-12 RX ORDER — ZOLPIDEM TARTRATE 10 MG/1
10 TABLET ORAL
Qty: 30 TAB | Refills: 2 | Status: SHIPPED | OUTPATIENT
Start: 2020-10-12 | End: 2020-12-26

## 2020-10-12 NOTE — PROGRESS NOTES
Assessment and Plan    1. Needs flu shot  given  - INFLUENZA, HIGH DOSE SEASONAL    2. Essential hypertension  At goal  - METABOLIC PANEL, BASIC; Future  - MICROALBUMIN, UR, RAND W/ MICROALB/CREAT RATIO; Future    3. Osteoporosis, unspecified osteoporosis type, unspecified pathological fracture presence  On fosamax 11 months  - VITAMIN D, 25 HYDROXY; Future    4. Hyperlipidemia, unspecified hyperlipidemia type  On repatha, zetia and lopid  - HEPATIC FUNCTION PANEL; Future  - LIPID PANEL; Future    5. Anemia, unspecified type  prevoiusly anemic  - CBC WITH AUTOMATED DIFF; Future  - VITAMIN B12; Future    6. Glucose intolerance  Check status  - HEMOGLOBIN A1C WITH EAG; Future    7. Insomnia, unspecified type  Refilled ambien  Checked , no sign of misuse or abuse  - zolpidem (AMBIEN) 10 mg tablet; Take 1 Tab by mouth nightly as needed for Sleep. Max Daily Amount: 10 mg. Dispense: 30 Tab; Refill: 2      Follow-up and Dispositions    · Return in about 6 months (around 4/12/2021) for Medication follow up. Diagnosis and plan discussed with patient who verbillized understanding. History of present Adryan Turner is a 65 y.o. female presenting for Medication Refill; Labs; and Immunization/Injection    Hypertension  At goal.  Takes meds consistently    Hypercholesterolemia  On repatha from Dr. Amanda Youssef  On lopid and zetia from us as well    Needs refill ambien  Takes most nights without ill effect    Osteoporosis  On alendronate  No Vitamin D on chart  CKD2    Hx of anemia and glucose intolerance, would like rechecked. Review of Systems   Constitutional: Negative for chills and fever. HENT: Negative for congestion and sore throat. Respiratory: Negative for cough and shortness of breath. Cardiovascular: Negative for chest pain and palpitations. Musculoskeletal: Positive for joint pain. Psychiatric/Behavioral: Negative.           Past Medical History:   Diagnosis Date    Aneurysm ([facility] [address] splenic artery aneurysm - no longer needs to be followed up - will never be a problem    Arthritis     back    Chronic pain     back    CKD (chronic kidney disease) stage 2, GFR 60-89 ml/min     Hyperlipidemia     Hypertension     Ill-defined condition     walks with a cane    Ill-defined condition     cardiac calcium test - some build up/stress test is \"fine\" per pt    Lumbar stenosis     Migraine     Other ill-defined conditions(799.89)     wheezing with allergies    Other ill-defined conditions(799.89)     GI bleed - stomach - NSAID use    Overactive bladder     PUD (peptic ulcer disease)     Stenosis of lumbosacral spine     Thromboembolus ([address]     PE     Past Surgical History:   Procedure Laterality Date    COLONOSCOPY N/A 10/1/2018    COLONOSCOPY performed [facility]ustos [facility] at [facility] ENDOSCOPY    HX HYSTERECTOMY      HX ORTHOPAEDIC  2009    spinal fusion/has not had a lumbar laminectomy    HX ORTHOPAEDIC Bilateral     bunionectomy - bilateral once and unilateral once    HX ORTHOPAEDIC Bilateral     carpal tunnel release    HX TONSILLECTOMY      T & A    HX UROLOGICAL  2012    bladder repair     Family History   Problem Relation Age of Onset    Cancer Father         bladder    Cancer Brother         BCCA    Stroke Mother     Cancer Maternal Uncle         leukemia    Breast Cancer Paternal Aunt     Stroke Maternal Grandmother     Colon Cancer Paternal Grandfather     Cancer Maternal Uncle         throat     Social History     Socioeconomic History    Marital status:      Spouse name: Not on file    Number of children: Not on file    Years of education: Not on file    Highest education level: Not on file   Occupational History    Not on file   Social Needs    Financial resource strain: Not on file    Food insecurity     Worry: Not on file     Inability: Not on file    Transportation needs     Medical: Not on file     Non-medical: Not on file   Tobacco Use    Smoking status: Never Smoker    Smokeless tobacco: Never Used   Substance and Sexual Activity    Alcohol use: Yes     Comment: very, very rare    Drug use: No    Sexual activity: Yes     Partners: Male   Lifestyle    Physical activity     Days per week: Not on file     Minutes per session: Not on file    Stress: Not on file   Relationships    Social connections     Talks on phone: Not on file     Gets together: Not on file     Attends Mosque service: Not on file     Active member of club or organization: Not on file     Attends meetings of clubs or organizations: Not on file     Relationship status: Not on file    Intimate partner violence     Fear of current or ex partner: Not on file     Emotionally abused: Not on file     Physically abused: Not on file     Forced sexual activity: Not on file   Other Topics Concern    Not on file   Social History Narrative    Not on file         Prior to Admission medications    Medication Sig Start Date End Date Taking? Authorizing Provider   zolpidem (AMBIEN) 10 mg tablet Take 1 Tab by mouth nightly as needed for Sleep. Max Daily Amount: 10 mg. 10/12/20  Yes Sam Lassiter MD   ezetimibe (ZETIA) 10 mg tablet TAKE 1 TABLET BY MOUTH DAILY 9/30/20  Yes Sam Lassiter MD   gemfibroziL (LOPID) 600 mg tablet TAKE 1 TABLET BY MOUTH TWICE DAILY 9/30/20  Yes Sam Lassiter MD   metoprolol tartrate (LOPRESSOR) 25 mg tablet TAKE 1 TABLET BY MOUTH TWICE DAILY 9/1/20  Yes Sam Lassiter MD   REPATHA SURECLICK pen injection 142 mg Once every 2 weeks. 10/30/19  Yes Provider, Historical   alendronate (FOSAMAX) 70 mg tablet Take 1 Tab by mouth every seven (7) days. 12/11/19  Yes Sam Lassiter MD   eletriptan (RELPAX) 20 mg tablet Take 40 mg by mouth once as needed. may repeat in 2 hours if necessary   Yes Provider, Historical   KRILL OIL PO Take  by mouth. Yes Provider, Historical   multivitamin (MULTIPLE VITAMINS PO) Take  by mouth.    Yes Provider, Historical   Lactobacillus acidophilus (ACIDOPHILUS PO) Take  by mouth. Yes Provider, Historical   topiramate (TOPAMAX) 200 mg tablet Take 200 mg by mouth two (2) times a day. Yes Provider, Historical   cholecalciferol, vitamin D3, 2,000 unit tab Take 2,000 Units by mouth daily. Yes Provider, Historical   turmeric (CURCUMIN) Take 600 mg by mouth daily. Yes Provider, Historical   acetaminophen (TYLENOL EXTRA STRENGTH PO) Take  by mouth. Takes 2 po as needed for leg or back pain. Yes Provider, Historical   cetirizine (ZYRTEC) 10 mg tablet Take 10 mg by mouth daily. Yes Provider, Historical   zolpidem (AMBIEN) 10 mg tablet Take 1 Tab by mouth nightly as needed for Sleep. Max Daily Amount: 10 mg. 5/27/20 10/12/20  Selmer Mortimer, MD   montelukast (SINGULAIR) 10 mg tablet TAKE 1 TABLET BY MOUTH DAILY 3/12/20 10/12/20  Selmer Mortimer, MD        Allergies   Allergen Reactions    Nitrofurantoin Other (comments)     LOW WBC - 2000    Nsaids (Non-Steroidal Anti-Inflammatory Drug) Other (comments)     GI bleed.  Other Plant, Animal, Environmental Other (comments)     Mold, dust, cats, dog allergies. ENVIRONMENTAL ALLERGIES.  Tolmetin Unknown (comments)    Toradol [Ketorolac Tromethamine] Rash       Vitals:    10/12/20 1026   BP: 126/80   Pulse: 79   Resp: 16   Temp: 98.2 °F (36.8 °C)   TempSrc: Oral   SpO2: 93%   Weight: 160 lb (72.6 kg)   Height: 5' 1\" (1.549 m)     Body mass index is 30.23 kg/m². Objective  Physical Exam  Vitals signs and nursing note reviewed. Constitutional:       Appearance: Normal appearance. She is not toxic-appearing. HENT:      Head: Normocephalic and atraumatic. Neck:      Musculoskeletal: No muscular tenderness. Cardiovascular:      Rate and Rhythm: Normal rate and regular rhythm. Heart sounds: Normal heart sounds. No murmur. No gallop. Pulmonary:      Effort: Pulmonary effort is normal. No respiratory distress.       Breath sounds: Normal breath sounds. No wheezing, rhonchi or rales. Lymphadenopathy:      Cervical: No cervical adenopathy. Neurological:      Mental Status: She is alert. Psychiatric:         Mood and Affect: Mood normal.         Behavior: Behavior normal.         Thought Content:  Thought content normal.         Judgment: Judgment normal.

## 2020-10-12 NOTE — PATIENT INSTRUCTIONS
Vaccine Information Statement Influenza (Flu) Vaccine (Inactivated or Recombinant): What You Need to Know Many Vaccine Information Statements are available in Portuguese and other languages. See www.immunize.org/vis Hojas de información sobre vacunas están disponibles en español y en muchos otros idiomas. Visite www.immunize.org/vis 1. Why get vaccinated? Influenza vaccine can prevent influenza (flu). Flu is a contagious disease that spreads around the [facility] every year, usually between October and May. Anyone can get the flu, but it is more dangerous for some people. Infants and young children, people 72years of age and older, pregnant women, and people with certain health conditions or a weakened immune system are at greatest risk of flu complications. Pneumonia, bronchitis, sinus infections and ear infections are examples of flu-related complications. If you have a medical condition, such as heart disease, cancer or diabetes, flu can make it worse. Flu can cause fever and chills, sore throat, muscle aches, fatigue, cough, headache, and runny or stuffy nose. Some people may have vomiting and diarrhea, though this is more common in children than adults. Each year thousands of people in the [facility] die from flu, and many more are hospitalized. Flu vaccine prevents millions of illnesses and flu-related visits to the doctor each year. 2. Influenza vaccines CDC recommends everyone 10months of age and older get vaccinated every flu season. Children 6 months through 6years of age may need 2 doses during a single flu season. Everyone else needs only 1 dose each flu season. It takes about 2 weeks for protection to develop after vaccination. There are many flu viruses, and they are always changing. Each year a new flu vaccine is made to protect against three or four viruses that are likely to cause disease in the upcoming flu season.  Even when the vaccine doesnt exactly match these viruses, it may still provide some protection. Influenza vaccine does not cause flu. Influenza vaccine may be given at the same time as other vaccines. 3. Talk with your health care provider Tell your vaccine provider if the person getting the vaccine: 
 Has had an allergic reaction after a previous dose of influenza vaccine, or has any severe, life-threatening allergies.  Has ever had Guillain-Barré Syndrome (also called GBS). In some cases, your health care provider may decide to postpone influenza vaccination to a future visit. People with minor illnesses, such as a cold, may be vaccinated. People who are moderately or severely ill should usually wait until they recover before getting influenza vaccine. Your health care provider can give you more information. 4. Risks of a reaction  Soreness, redness, and swelling where shot is given, fever, muscle aches, and headache can happen after influenza vaccine.  There may be a very small increased risk of Guillain-Barré Syndrome (GBS) after inactivated influenza vaccine (the flu shot). [facility] children who get the flu shot along with pneumococcal vaccine (PCV13), and/or DTaP vaccine at the same time might be slightly more likely to have a seizure caused by fever. Tell your health care provider if a child who is getting flu vaccine has ever had a seizure. People sometimes faint after medical procedures, including vaccination. Tell your provider if you feel dizzy or have vision changes or ringing in the ears. As with any medicine, there is a very remote chance of a vaccine causing a severe allergic reaction, other serious injury, or death. 5. What if there is a serious problem? An allergic reaction could occur after the vaccinated person leaves the clinic.  If you see signs of a severe allergic reaction (hives, swelling of the face and throat, difficulty breathing, a fast heartbeat, dizziness, or weakness), call 9-1-1 and get the person to the nearest hospital. 
 
For other signs that concern you, call your health care provider. Adverse reactions should be reported to the Vaccine Adverse Event Reporting System (VAERS). Your health care provider will usually file this report, or you can do it yourself. Visit the VAERS website at www.vaers. hhs.gov or call 1-986.847.4763. VAERS is only for reporting reactions, and VAERS staff do not give medical advice. 6. The National Vaccine Injury Compensation Program 
 
The [facility] Vaccine Injury Compensation Program (VICP) is a federal program that was created to compensate people who may have been injured by certain vaccines. Visit the VICP website at www.hrsa.gov/vaccinecompensation or call 4-586.562.4903 to learn about the program and about filing a claim. There is a time limit to file a claim for compensation. 7. How can I learn more?  Ask your health care provider.  Call your local or state health department.  Contact the Centers for Disease Control and Prevention (CDC): 
- Call 7-876.509.3640 (1-800-CDC-INFO) or 
- Visit CDCs influenza website at www.cdc.gov/flu Vaccine Information Statement (Interim) Inactivated Influenza Vaccine 8/15/2019 
[address] SUDHE[address] Jacques [id number] Department of [facility] and [employer] Centers for Disease Control and Prevention Office Use Only

## 2020-10-12 NOTE — PROGRESS NOTES
1. Have you been to the ER, urgent care clinic since your last visit? Hospitalized since your last visit? No    2. Have you seen or consulted any other health care providers outside of the [address] [place] [address] since your last visit? Include any pap smears or colon screening. No     Pharmacy verified. [id number], [address] AT [address]    Chief Complaint   Patient presents with    Medication Refill    Labs    Immunization/Injection     Visit Vitals  /80 (BP 1 Location: Left arm, BP Patient Position: Sitting)   Pulse 79   Temp 98.2 °F (36.8 °C) (Oral)   Resp 16   Ht 5' 1\" (1.549 m)   Wt 160 lb (72.6 kg)   SpO2 93%   BMI 30.23 kg/m²     3 most recent PHQ Screens 10/12/2020   Little interest or pleasure in doing things Not at all   Feeling down, depressed, irritable, or hopeless Not at all   Total Score PHQ 2 0     Health Maintenance Due   Topic Date Due    DTaP/Tdap/Td series (1 - Tdap) 03[birth year]06/1969    GLAUCOMA SCREENING Q2Y  03/06/2013    Pneumococcal 65+ years (2 of 2 - PPSV23) 01/06/2017    Shingrix Vaccine Age 50> (2 of 2) 01/11/2020    Flu Vaccine (1) 09/01/2020    Medicare Yearly Exam  09/05/2020     Patient reports Pneumococcal and Zoster vaccine completed.

## 2020-12-05 DIAGNOSIS — M81.0 OSTEOPOROSIS, UNSPECIFIED OSTEOPOROSIS TYPE, UNSPECIFIED PATHOLOGICAL FRACTURE PRESENCE: ICD-10-CM

## 2020-12-07 RX ORDER — ALENDRONATE SODIUM 70 MG/1
70 TABLET ORAL
Qty: 13 TAB | Refills: 3 | Status: SHIPPED | OUTPATIENT
Start: 2020-12-07 | End: 2020-12-08 | Stop reason: SDUPTHER

## 2020-12-08 DIAGNOSIS — M81.0 OSTEOPOROSIS, UNSPECIFIED OSTEOPOROSIS TYPE, UNSPECIFIED PATHOLOGICAL FRACTURE PRESENCE: ICD-10-CM

## 2020-12-08 RX ORDER — ALENDRONATE SODIUM 70 MG/1
70 TABLET ORAL
Qty: 13 TAB | Refills: 3 | Status: SHIPPED | OUTPATIENT
Start: 2020-12-08 | End: 2022-02-19

## 2020-12-14 ENCOUNTER — NURSE TRIAGE (OUTPATIENT)
Dept: OTHER | Facility: CLINIC | Age: 70
End: 2020-12-14

## 2020-12-14 NOTE — TELEPHONE ENCOUNTER
Reason for Disposition   [1] HIGH RISK patient (e.g., age > 59 years, diabetes, heart or lung disease, weak immune system) AND [2] new or worsening symptoms    Answer Assessment - Initial Assessment Questions  1. COVID-19 DIAGNOSIS: \"Who made your Coronavirus (COVID-19) diagnosis? \" \"Was it confirmed by a positive lab test?\" If not diagnosed by a HCP, ask \"Are there lots of cases (community spread) where you live? \" (See public health department website, if unsure)        2. COVID-19 EXPOSURE: \"Was there any known exposure to COVID before the symptoms began? \" CDC Definition of close contact: within 6 feet (2 meters) for a total of 15 minutes or more over a 24-hour period. covid exposure to her friend. Was together in a car no mask worn in car    3. ONSET: \"When did the COVID-19 symptoms start? \"       Symptoms started last night    4. WORST SYMPTOM: \"What is your worst symptom? \" (e.g., cough, fever, shortness of breath, muscle aches)        5. COUGH: \"Do you have a cough? \" If so, ask: \"How bad is the cough? \"        Coughed throughout the night. Cough off and on today    6. FEVER: \"Do you have a fever? \" If so, ask: \"What is your temperature, how was it measured, and when did it start? \"      No fever    7. RESPIRATORY STATUS: \"Describe your breathing? \" (e.g., shortness of breath, wheezing, unable to speak)       No sob, no wheezing    8. BETTER-SAME-WORSE: Terrell Parker you getting better, staying the same or getting worse compared to yesterday? \"  If getting worse, ask, \"In what way?\"        9. HIGH RISK DISEASE: \"Do you have any chronic medical problems? \" (e.g., asthma, heart or lung disease, weak immune system, obesity, etc.)      Age, has quite a few medical problems    10. PREGNANCY: \"Is there any chance you are pregnant? \" \"When was your last menstrual period? \"          11. OTHER SYMPTOMS: \"Do you have any other symptoms? \"  (e.g., chills, fatigue, headache, loss of smell or taste, muscle pain, sore throat; new loss of smell or taste especially support the diagnosis of COVID-19)       Sinus pressure, scratchy throat, sore chest from coughing, slight headache    Protocols used: CORONAVIRUS (COVID-19) DIAGNOSED OR SUSPECTED-ADULT-AH    Patient called pre-service center [facility]) to schedule appointment, with red flag complaint, transferred to RN access for triage. Pt calling with covid exposure on Thursday to her friend. Her symptoms started yesterday. Has cough, sinus pressure and slight headache. No sob, no fever. Care advise given, call sooner if worsens. Call soft transferred to [address] [place] [address] at [facility] to schedule appt.

## 2020-12-24 ENCOUNTER — VIRTUAL VISIT (OUTPATIENT)
Dept: FAMILY MEDICINE CLINIC | Age: 70
End: 2020-12-24
Payer: MEDICARE

## 2020-12-24 DIAGNOSIS — U07.1 COVID-19: Primary | ICD-10-CM

## 2020-12-24 DIAGNOSIS — R06.02 SHORTNESS OF BREATH: ICD-10-CM

## 2020-12-24 PROCEDURE — 99443 PR PHYS/QHP TELEPHONE EVALUATION 21-30 MIN: CPT | Performed by: FAMILY MEDICINE

## 2020-12-24 RX ORDER — PREDNISONE 20 MG/1
TABLET ORAL
Qty: 42 TAB | Refills: 0 | Status: SHIPPED | OUTPATIENT
Start: 2020-12-24 | End: 2020-12-26 | Stop reason: ALTCHOICE

## 2020-12-24 NOTE — PROGRESS NOTES
Robbie Leary is a 65 y.o. female evaluated via telephone on 12/24/2020. Consent:  She and/or health care decision maker is aware that that she may receive a bill for this telephone service, depending on her insurance coverage, and has provided verbal consent to proceed: Yes      Documentation:  I communicated with the patient and/or health care decision maker about see below. Details of this discussion including any medical advice provided:      Assessment & Plan:   Diagnoses and all orders for this visit:    1. COVID-19  -     predniSONE (DELTASONE) 20 mg tablet; Take 60 mg by mouth two (2) times a day for 3 days, THEN 40 mg two (2) times a day for 3 days, THEN 40 mg daily for 3 days, THEN 20 mg daily for 3 days. THEN 1/2 TAB X 3DAYS    2. Shortness of breath  -     predniSONE (DELTASONE) 20 mg tablet; Take 60 mg by mouth two (2) times a day for 3 days, THEN 40 mg two (2) times a day for 3 days, THEN 40 mg daily for 3 days, THEN 20 mg daily for 3 days. THEN 1/2 TAB X 3DAYS          Follow-up and Dispositions    · Return if symptoms worsen or fail to improve. I ADVISED PATIENT TO GO TO ER IF SYMPTOMS WORSEN , CHANGE OR FAILS TO IMPROVE. [address] [place] [address] Evita Kowalski is a 65 y.o. female who was seen for Shortness of Breath      1. COVID-19  2. Shortness of breath  Patient reports having been diagnosed with COVID-19. She was exposed on 12/10/2020, her symptoms started on 12/13/2020. Initially she had symptoms of cough and fever. She went to [facility] urgent care when she had shortness of breath. She was prescribed albuterol inhaler at [facility]. She was also given a pulse oximeter. Patient has been checking her pulse ox daily. She reports having chest tightness which is resolved with use of inhaler. Currently she denies cough or fever or any other symptoms except for shortness of breath. She reports her O2 sats go to [phone] and then she takes deep breaths to bring it back up.   Patient reports she was advised to go to ER with O2 sats less than 92. Patient does not want to go to ER/hospital.  She is requesting antibiotic to help relieve some of her symptoms as she has a nurse friend who recommended antibiotic. I advised patient that starting antibiotic at this time would be much useful, we can start prednisone to see if that helps some of her symptoms. I also advised patient that she must call 911 or go to ER if she notices her O2 sats dropping less than 92 or if she develops worsening shortness of breath. Patient understands and agrees to go to ER if her symptoms worsen. Prior to Admission medications    Medication Sig Start Date End Date Taking? Authorizing Provider   predniSONE (DELTASONE) 20 mg tablet Take 60 mg by mouth two (2) times a day for 3 days, THEN 40 mg two (2) times a day for 3 days, THEN 40 mg daily for 3 days, THEN 20 mg daily for 3 days. THEN 1/2 TAB X 3DAYS 12/24/20 1/5/21 Yes Javad Waller MD   alendronate (FOSAMAX) 70 mg tablet Take 1 Tab by mouth every seven (7) days. 12/8/20   Patsy Zuniga MD   zolpidem (AMBIEN) 10 mg tablet Take 1 Tab by mouth nightly as needed for Sleep. Max Daily Amount: 10 mg. 10/12/20   Patsy Zuniga MD   ezetimibe (ZETIA) 10 mg tablet TAKE 1 TABLET BY MOUTH DAILY 9/30/20   Patsy Zuniga MD   gemfibroziL (LOPID) 600 mg tablet TAKE 1 TABLET BY MOUTH TWICE DAILY 9/30/20   Patsy Zuniga MD   metoprolol tartrate (LOPRESSOR) 25 mg tablet TAKE 1 TABLET BY MOUTH TWICE DAILY 9/1/20   Patsy Zuniga MD   REPATHA SURECLICK pen injection 578 mg Once every 2 weeks. 10/30/19   Provider, Historical   eletriptan (RELPAX) 20 mg tablet Take 40 mg by mouth once as needed. may repeat in 2 hours if necessary    Provider, Historical   KRILL OIL PO Take  by mouth. Provider, Historical   multivitamin (MULTIPLE VITAMINS PO) Take  by mouth. Provider, Historical   Lactobacillus acidophilus (ACIDOPHILUS PO) Take  by mouth.     Provider, Historical topiramate (TOPAMAX) 200 mg tablet Take 200 mg by mouth two (2) times a day. Provider, Historical   cholecalciferol, vitamin D3, 2,000 unit tab Take 2,000 Units by mouth daily. Provider, Historical   turmeric (CURCUMIN) Take 600 mg by mouth daily. Provider, Historical   acetaminophen (TYLENOL EXTRA STRENGTH PO) Take  by mouth. Takes 2 po as needed for leg or back pain. Provider, Historical   cetirizine (ZYRTEC) 10 mg tablet Take 10 mg by mouth daily. Provider, Historical     Allergies   Allergen Reactions    Nitrofurantoin Other (comments)     LOW WBC - 2000    Nsaids (Non-Steroidal Anti-Inflammatory Drug) Other (comments)     GI bleed.  Other Plant, Animal, Environmental Other (comments)     Mold, dust, cats, dog allergies. ENVIRONMENTAL ALLERGIES.     Tolmetin Unknown (comments)    Toradol [Ketorolac Tromethamine] Rash       Patient Active Problem List   Diagnosis Code    Hypertension I10    Hyperlipidemia E78.5    Chronic pain G89.29    Leukocytosis, unspecified D72.829    Anemia, unspecified D64.9    Dehydration E86.0    Renal failure, acute ([facility]) N17.9    Altered mental status R41.82    Stenosis of lumbosacral spine M48.07    Hyponatremia E87.1    Hyperkalemia E87.5    Acidosis F46.2    Metabolic encephalopathy F50.31    Insomnia G47.00     Patient Active Problem List    Diagnosis Date Noted    Insomnia 12/06/2018    Hyponatremia 06/09/2012    Hyperkalemia 06/09/2012    Acidosis 65/06/9208    Metabolic encephalopathy 02/41/7693    Leukocytosis, unspecified 10/11/2011    Anemia, unspecified 10/11/2011    Dehydration 10/11/2011    Renal failure, acute ([facility] [address] 10/11/2011    Altered mental status 10/11/2011    Hypertension     Hyperlipidemia     Chronic pain     Stenosis of lumbosacral spine      Current Outpatient Medications   Medication Sig Dispense Refill    predniSONE (DELTASONE) 20 mg tablet Take 60 mg by mouth two (2) times a day for 3 days, THEN 40 mg two (2) times a day for 3 days, THEN 40 mg daily for 3 days, THEN 20 mg daily for 3 days. THEN 1/2 TAB X 3DAYS 42 Tab 0    alendronate (FOSAMAX) 70 mg tablet Take 1 Tab by mouth every seven (7) days. 13 Tab 3    zolpidem (AMBIEN) 10 mg tablet Take 1 Tab by mouth nightly as needed for Sleep. Max Daily Amount: 10 mg. 30 Tab 2    ezetimibe (ZETIA) 10 mg tablet TAKE 1 TABLET BY MOUTH DAILY 90 Tab 1    gemfibroziL (LOPID) 600 mg tablet TAKE 1 TABLET BY MOUTH TWICE DAILY 180 Tab 1    metoprolol tartrate (LOPRESSOR) 25 mg tablet TAKE 1 TABLET BY MOUTH TWICE DAILY 180 Tab 1    REPATHA SURECLICK pen injection 156 mg Once every 2 weeks. 6    eletriptan (RELPAX) 20 mg tablet Take 40 mg by mouth once as needed. may repeat in 2 hours if necessary      KRILL OIL PO Take  by mouth.  multivitamin (MULTIPLE VITAMINS PO) Take  by mouth.  Lactobacillus acidophilus (ACIDOPHILUS PO) Take  by mouth.  topiramate (TOPAMAX) 200 mg tablet Take 200 mg by mouth two (2) times a day.  cholecalciferol, vitamin D3, 2,000 unit tab Take 2,000 Units by mouth daily.  turmeric (CURCUMIN) Take 600 mg by mouth daily.  acetaminophen (TYLENOL EXTRA STRENGTH PO) Take  by mouth. Takes 2 po as needed for leg or back pain.  cetirizine (ZYRTEC) 10 mg tablet Take 10 mg by mouth daily. Allergies   Allergen Reactions    Nitrofurantoin Other (comments)     LOW WBC - 2000    Nsaids (Non-Steroidal Anti-Inflammatory Drug) Other (comments)     GI bleed.  Other Plant, Animal, Environmental Other (comments)     Mold, dust, cats, dog allergies. ENVIRONMENTAL ALLERGIES.     Tolmetin Unknown (comments)    Toradol [Ketorolac Tromethamine] Rash     Past Medical History:   Diagnosis Date    [facility]     splenic artery aneurysm - no longer needs to be followed up - will never be a problem    Arthritis     back    Chronic pain     back    CKD (chronic kidney disease) stage 2, GFR 60-89 ml/min     Hyperlipidemia  Hypertension     Ill-defined condition     walks with a cane    Ill-defined condition     cardiac calcium test - some build up/stress test is \"fine\" per pt    Lumbar stenosis     Migraine     Other ill-defined conditions(799.89)     wheezing with allergies    Other ill-defined conditions(799.89)     GI bleed - stomach - NSAID use    Overactive bladder     PUD (peptic ulcer disease)     Stenosis of lumbosacral spine     Thromboembolus ([address]     PE     Past Surgical History:   Procedure Laterality Date    COLONOSCOPY N/A 10/1/2018    COLONOSCOPY performed by Emir Montaño MD at [facility] ENDOSCOPY    HX HYSTERECTOMY      HX ORTHOPAEDIC  2009    spinal fusion/has not had a lumbar laminectomy    HX ORTHOPAEDIC Bilateral     bunionectomy - bilateral once and unilateral once    HX ORTHOPAEDIC Bilateral     carpal tunnel release    HX TONSILLECTOMY      T & A    HX UROLOGICAL  2012    bladder repair     Family History   Problem Relation Age of Onset    Cancer Father         bladder    Cancer Brother         BCCA    Stroke Mother     Cancer Maternal Uncle         leukemia    Breast Cancer Paternal Aunt     Stroke Maternal Grandmother     Colon Cancer Paternal Grandfather     Cancer Maternal Uncle         throat     Social History     Tobacco Use    Smoking status: Never Smoker    Smokeless tobacco: Never Used   Substance Use Topics    Alcohol use: Yes     Comment: very, very rare       Review of Systems   Constitutional: Negative. Negative for fever. HENT: Negative. Eyes: Negative. Respiratory: Positive for shortness of breath. Negative for cough and sputum production. Cardiovascular: Negative. Gastrointestinal: Negative. Genitourinary: Negative. Musculoskeletal: Negative. Skin: Negative. Neurological: Negative. Endo/Heme/Allergies: Negative. Psychiatric/Behavioral: Negative. Patient was located at her home.  I was at office while conducting this encounter. I affirm this is a Patient Initiated Episode with an Established Patient who has not had a related appointment within my department in the past 7 days or scheduled within the next 24 hours.     Total Time: minutes: 21-30 minutes    Note: not billable if this call serves to triage the patient into an appointment for the relevant concern      Connie Mcmillan MD

## 2020-12-26 ENCOUNTER — HOSPITAL ENCOUNTER (INPATIENT)
Age: 70
LOS: 3 days | Discharge: HOME | DRG: 177 | End: 2020-12-29
Attending: STUDENT IN AN ORGANIZED HEALTH CARE EDUCATION/TRAINING PROGRAM | Admitting: FAMILY MEDICINE
Payer: MEDICARE

## 2020-12-26 ENCOUNTER — APPOINTMENT (OUTPATIENT)
Dept: CT IMAGING | Age: 70
DRG: 177 | End: 2020-12-26
Attending: STUDENT IN AN ORGANIZED HEALTH CARE EDUCATION/TRAINING PROGRAM
Payer: MEDICARE

## 2020-12-26 ENCOUNTER — APPOINTMENT (OUTPATIENT)
Dept: GENERAL RADIOLOGY | Age: 70
DRG: 177 | End: 2020-12-26
Attending: STUDENT IN AN ORGANIZED HEALTH CARE EDUCATION/TRAINING PROGRAM
Payer: MEDICARE

## 2020-12-26 DIAGNOSIS — R31.9 URINARY TRACT INFECTION WITH HEMATURIA, SITE UNSPECIFIED: ICD-10-CM

## 2020-12-26 DIAGNOSIS — U07.1 COVID-19: Primary | ICD-10-CM

## 2020-12-26 DIAGNOSIS — U07.1 ACUTE HYPOXEMIC RESPIRATORY FAILURE DUE TO COVID-19 (HCC): ICD-10-CM

## 2020-12-26 DIAGNOSIS — E78.5 HYPERLIPIDEMIA, UNSPECIFIED HYPERLIPIDEMIA TYPE: ICD-10-CM

## 2020-12-26 DIAGNOSIS — J96.01 ACUTE HYPOXEMIC RESPIRATORY FAILURE DUE TO COVID-19 (HCC): ICD-10-CM

## 2020-12-26 DIAGNOSIS — N39.0 URINARY TRACT INFECTION WITH HEMATURIA, SITE UNSPECIFIED: ICD-10-CM

## 2020-12-26 DIAGNOSIS — R06.09 DYSPNEA ON EXERTION: ICD-10-CM

## 2020-12-26 DIAGNOSIS — J18.9 MULTIFOCAL PNEUMONIA: ICD-10-CM

## 2020-12-26 DIAGNOSIS — G47.00 INSOMNIA, UNSPECIFIED TYPE: ICD-10-CM

## 2020-12-26 DIAGNOSIS — R09.02 HYPOXIA: ICD-10-CM

## 2020-12-26 DIAGNOSIS — I10 ESSENTIAL HYPERTENSION: ICD-10-CM

## 2020-12-26 LAB
ALBUMIN SERPL-MCNC: 3.4 G/DL (ref 3.5–5)
ALBUMIN/GLOB SERPL: 0.9 {RATIO} (ref 1.1–2.2)
ALP SERPL-CCNC: 100 U/L (ref 45–117)
ALT SERPL-CCNC: 23 U/L (ref 12–78)
ANION GAP SERPL CALC-SCNC: 6 MMOL/L (ref 5–15)
APPEARANCE UR: ABNORMAL
AST SERPL-CCNC: 30 U/L (ref 15–37)
BACTERIA URNS QL MICRO: ABNORMAL /HPF
BASOPHILS # BLD: 0 K/UL (ref 0–0.1)
BASOPHILS NFR BLD: 0 % (ref 0–1)
BILIRUB SERPL-MCNC: 0.3 MG/DL (ref 0.2–1)
BILIRUB UR QL: NEGATIVE
BUN SERPL-MCNC: 24 MG/DL (ref 6–20)
BUN/CREAT SERPL: 21 (ref 12–20)
CALCIUM SERPL-MCNC: 9.5 MG/DL (ref 8.5–10.1)
CHLORIDE SERPL-SCNC: 110 MMOL/L (ref 97–108)
CO2 SERPL-SCNC: 23 MMOL/L (ref 21–32)
COLOR UR: ABNORMAL
COMMENT, HOLDF: NORMAL
CREAT SERPL-MCNC: 1.12 MG/DL (ref 0.55–1.02)
CRP SERPL HS-MCNC: >9.5 MG/L
D DIMER PPP FEU-MCNC: 0.94 MG/L FEU (ref 0–0.65)
DIFFERENTIAL METHOD BLD: ABNORMAL
EOSINOPHIL # BLD: 0 K/UL (ref 0–0.4)
EOSINOPHIL NFR BLD: 0 % (ref 0–7)
EPITH CASTS URNS QL MICRO: ABNORMAL /LPF
ERYTHROCYTE [DISTWIDTH] IN BLOOD BY AUTOMATED COUNT: 13.2 % (ref 11.5–14.5)
FERRITIN SERPL-MCNC: 357 NG/ML (ref 26–388)
FIBRINOGEN PPP-MCNC: 635 MG/DL (ref 200–475)
GLOBULIN SER CALC-MCNC: 3.8 G/DL (ref 2–4)
GLUCOSE SERPL-MCNC: 122 MG/DL (ref 65–100)
GLUCOSE UR STRIP.AUTO-MCNC: NEGATIVE MG/DL
HCT VFR BLD AUTO: 37.2 % (ref 35–47)
HGB BLD-MCNC: 12.4 G/DL (ref 11.5–16)
HGB UR QL STRIP: ABNORMAL
IMM GRANULOCYTES # BLD AUTO: 0.2 K/UL (ref 0–0.04)
IMM GRANULOCYTES NFR BLD AUTO: 1 % (ref 0–0.5)
KETONES UR QL STRIP.AUTO: NEGATIVE MG/DL
LACTATE BLD-SCNC: 1.29 MMOL/L (ref 0.4–2)
LDH SERPL L TO P-CCNC: 243 U/L (ref 81–246)
LEUKOCYTE ESTERASE UR QL STRIP.AUTO: ABNORMAL
LYMPHOCYTES # BLD: 1 K/UL (ref 0.8–3.5)
LYMPHOCYTES NFR BLD: 7 % (ref 12–49)
MCH RBC QN AUTO: 29.1 PG (ref 26–34)
MCHC RBC AUTO-ENTMCNC: 33.3 G/DL (ref 30–36.5)
MCV RBC AUTO: 87.3 FL (ref 80–99)
MONOCYTES # BLD: 0.7 K/UL (ref 0–1)
MONOCYTES NFR BLD: 5 % (ref 5–13)
NEUTS SEG # BLD: 12.8 K/UL (ref 1.8–8)
NEUTS SEG NFR BLD: 87 % (ref 32–75)
NITRITE UR QL STRIP.AUTO: NEGATIVE
NRBC # BLD: 0 K/UL (ref 0–0.01)
NRBC BLD-RTO: 0 PER 100 WBC
PH UR STRIP: 6.5 [PH] (ref 5–8)
PLATELET # BLD AUTO: 356 K/UL (ref 150–400)
PMV BLD AUTO: 11.3 FL (ref 8.9–12.9)
POTASSIUM SERPL-SCNC: 4.6 MMOL/L (ref 3.5–5.1)
PROCALCITONIN SERPL-MCNC: <0.05 NG/ML
PROT SERPL-MCNC: 7.2 G/DL (ref 6.4–8.2)
PROT UR STRIP-MCNC: 30 MG/DL
RBC # BLD AUTO: 4.26 M/UL (ref 3.8–5.2)
RBC #/AREA URNS HPF: ABNORMAL /HPF (ref 0–5)
SAMPLES BEING HELD,HOLD: NORMAL
SODIUM SERPL-SCNC: 139 MMOL/L (ref 136–145)
SP GR UR REFRACTOMETRY: 1.01 (ref 1–1.03)
TROPONIN I SERPL-MCNC: <0.05 NG/ML
UA: UC IF INDICATED,UAUC: ABNORMAL
UROBILINOGEN UR QL STRIP.AUTO: 0.2 EU/DL (ref 0.2–1)
WBC # BLD AUTO: 14.7 K/UL (ref 3.6–11)
WBC URNS QL MICRO: >100 /HPF (ref 0–4)

## 2020-12-26 PROCEDURE — 99222 1ST HOSP IP/OBS MODERATE 55: CPT | Performed by: FAMILY MEDICINE

## 2020-12-26 PROCEDURE — 93005 ELECTROCARDIOGRAM TRACING: CPT

## 2020-12-26 PROCEDURE — 87077 CULTURE AEROBIC IDENTIFY: CPT

## 2020-12-26 PROCEDURE — 74011000250 HC RX REV CODE- 250: Performed by: STUDENT IN AN ORGANIZED HEALTH CARE EDUCATION/TRAINING PROGRAM

## 2020-12-26 PROCEDURE — 74011000258 HC RX REV CODE- 258: Performed by: STUDENT IN AN ORGANIZED HEALTH CARE EDUCATION/TRAINING PROGRAM

## 2020-12-26 PROCEDURE — 71045 X-RAY EXAM CHEST 1 VIEW: CPT

## 2020-12-26 PROCEDURE — 74011250636 HC RX REV CODE- 250/636: Performed by: STUDENT IN AN ORGANIZED HEALTH CARE EDUCATION/TRAINING PROGRAM

## 2020-12-26 PROCEDURE — 87186 SC STD MICRODIL/AGAR DIL: CPT

## 2020-12-26 PROCEDURE — 85379 FIBRIN DEGRADATION QUANT: CPT

## 2020-12-26 PROCEDURE — 86141 C-REACTIVE PROTEIN HS: CPT

## 2020-12-26 PROCEDURE — 81001 URINALYSIS AUTO W/SCOPE: CPT

## 2020-12-26 PROCEDURE — 83615 LACTATE (LD) (LDH) ENZYME: CPT

## 2020-12-26 PROCEDURE — 65660000000 HC RM CCU STEPDOWN

## 2020-12-26 PROCEDURE — 85384 FIBRINOGEN ACTIVITY: CPT

## 2020-12-26 PROCEDURE — 74011000636 HC RX REV CODE- 636: Performed by: STUDENT IN AN ORGANIZED HEALTH CARE EDUCATION/TRAINING PROGRAM

## 2020-12-26 PROCEDURE — 84484 ASSAY OF TROPONIN QUANT: CPT

## 2020-12-26 PROCEDURE — 82728 ASSAY OF FERRITIN: CPT

## 2020-12-26 PROCEDURE — 85025 COMPLETE CBC W/AUTO DIFF WBC: CPT

## 2020-12-26 PROCEDURE — 74011250637 HC RX REV CODE- 250/637: Performed by: STUDENT IN AN ORGANIZED HEALTH CARE EDUCATION/TRAINING PROGRAM

## 2020-12-26 PROCEDURE — 71275 CT ANGIOGRAPHY CHEST: CPT

## 2020-12-26 PROCEDURE — 87086 URINE CULTURE/COLONY COUNT: CPT

## 2020-12-26 PROCEDURE — 99285 EMERGENCY DEPT VISIT HI MDM: CPT

## 2020-12-26 PROCEDURE — 84145 PROCALCITONIN (PCT): CPT

## 2020-12-26 PROCEDURE — 80053 COMPREHEN METABOLIC PANEL: CPT

## 2020-12-26 PROCEDURE — 36415 COLL VENOUS BLD VENIPUNCTURE: CPT

## 2020-12-26 PROCEDURE — 83605 ASSAY OF LACTIC ACID: CPT

## 2020-12-26 RX ORDER — ONDANSETRON 2 MG/ML
4 INJECTION INTRAMUSCULAR; INTRAVENOUS
Status: DISCONTINUED | OUTPATIENT
Start: 2020-12-26 | End: 2020-12-29 | Stop reason: HOSPADM

## 2020-12-26 RX ORDER — CETIRIZINE HCL 10 MG
10 TABLET ORAL DAILY
Status: DISCONTINUED | OUTPATIENT
Start: 2020-12-27 | End: 2020-12-29 | Stop reason: HOSPADM

## 2020-12-26 RX ORDER — TOPIRAMATE 100 MG/1
200 TABLET, FILM COATED ORAL 2 TIMES DAILY
Status: DISCONTINUED | OUTPATIENT
Start: 2020-12-26 | End: 2020-12-29 | Stop reason: HOSPADM

## 2020-12-26 RX ORDER — ZOLPIDEM TARTRATE 5 MG/1
5 TABLET ORAL
COMMUNITY
End: 2021-02-11 | Stop reason: SDUPTHER

## 2020-12-26 RX ORDER — ACETAMINOPHEN 650 MG/1
650 SUPPOSITORY RECTAL
Status: DISCONTINUED | OUTPATIENT
Start: 2020-12-26 | End: 2020-12-29 | Stop reason: HOSPADM

## 2020-12-26 RX ORDER — MELATONIN
2000 DAILY
Status: DISCONTINUED | OUTPATIENT
Start: 2020-12-27 | End: 2020-12-29 | Stop reason: HOSPADM

## 2020-12-26 RX ORDER — GEMFIBROZIL 600 MG/1
600 TABLET, FILM COATED ORAL 2 TIMES DAILY
Status: DISCONTINUED | OUTPATIENT
Start: 2020-12-26 | End: 2020-12-29 | Stop reason: HOSPADM

## 2020-12-26 RX ORDER — ZOLPIDEM TARTRATE 5 MG/1
5 TABLET ORAL
Status: DISCONTINUED | OUTPATIENT
Start: 2020-12-26 | End: 2020-12-29 | Stop reason: HOSPADM

## 2020-12-26 RX ORDER — ACETAMINOPHEN 325 MG/1
650 TABLET ORAL
Status: DISCONTINUED | OUTPATIENT
Start: 2020-12-26 | End: 2020-12-29 | Stop reason: HOSPADM

## 2020-12-26 RX ORDER — ZINC SULFATE 50(220)MG
1 CAPSULE ORAL DAILY
Status: DISCONTINUED | OUTPATIENT
Start: 2020-12-27 | End: 2020-12-29 | Stop reason: HOSPADM

## 2020-12-26 RX ORDER — ELETRIPTAN HYDROBROMIDE 40 MG/1
40 TABLET, FILM COATED ORAL
Status: DISCONTINUED | OUTPATIENT
Start: 2020-12-26 | End: 2020-12-27

## 2020-12-26 RX ORDER — EZETIMIBE 10 MG/1
10 TABLET ORAL DAILY
Status: DISCONTINUED | OUTPATIENT
Start: 2020-12-27 | End: 2020-12-29 | Stop reason: HOSPADM

## 2020-12-26 RX ORDER — ENOXAPARIN SODIUM 100 MG/ML
40 INJECTION SUBCUTANEOUS DAILY
Status: DISCONTINUED | OUTPATIENT
Start: 2020-12-27 | End: 2020-12-29 | Stop reason: HOSPADM

## 2020-12-26 RX ORDER — SODIUM CHLORIDE 0.9 % (FLUSH) 0.9 %
5-40 SYRINGE (ML) INJECTION EVERY 8 HOURS
Status: DISCONTINUED | OUTPATIENT
Start: 2020-12-26 | End: 2020-12-29 | Stop reason: HOSPADM

## 2020-12-26 RX ORDER — ASCORBIC ACID 500 MG
500 TABLET ORAL 2 TIMES DAILY
Status: DISCONTINUED | OUTPATIENT
Start: 2020-12-26 | End: 2020-12-29 | Stop reason: HOSPADM

## 2020-12-26 RX ORDER — SODIUM CHLORIDE 0.9 % (FLUSH) 0.9 %
5-40 SYRINGE (ML) INJECTION AS NEEDED
Status: DISCONTINUED | OUTPATIENT
Start: 2020-12-26 | End: 2020-12-29 | Stop reason: HOSPADM

## 2020-12-26 RX ORDER — METOPROLOL TARTRATE 25 MG/1
25 TABLET, FILM COATED ORAL 2 TIMES DAILY
Status: DISCONTINUED | OUTPATIENT
Start: 2020-12-26 | End: 2020-12-29 | Stop reason: HOSPADM

## 2020-12-26 RX ORDER — PROMETHAZINE HYDROCHLORIDE 25 MG/1
12.5 TABLET ORAL
Status: DISCONTINUED | OUTPATIENT
Start: 2020-12-26 | End: 2020-12-29 | Stop reason: HOSPADM

## 2020-12-26 RX ORDER — POLYETHYLENE GLYCOL 3350 17 G/17G
17 POWDER, FOR SOLUTION ORAL DAILY PRN
Status: DISCONTINUED | OUTPATIENT
Start: 2020-12-26 | End: 2020-12-29 | Stop reason: HOSPADM

## 2020-12-26 RX ADMIN — Medication 500 MG: at 18:25

## 2020-12-26 RX ADMIN — TOPIRAMATE 200 MG: 100 TABLET, FILM COATED ORAL at 18:25

## 2020-12-26 RX ADMIN — METOPROLOL TARTRATE 25 MG: 25 TABLET, FILM COATED ORAL at 18:25

## 2020-12-26 RX ADMIN — DOXYCYCLINE 100 MG: 100 INJECTION, POWDER, LYOPHILIZED, FOR SOLUTION INTRAVENOUS at 18:26

## 2020-12-26 RX ADMIN — GEMFIBROZIL 600 MG: 600 TABLET ORAL at 18:25

## 2020-12-26 RX ADMIN — ZOLPIDEM TARTRATE 5 MG: 5 TABLET ORAL at 23:00

## 2020-12-26 RX ADMIN — Medication 10 ML: at 19:45

## 2020-12-26 RX ADMIN — IOPAMIDOL 60 ML: 755 INJECTION, SOLUTION INTRAVENOUS at 13:41

## 2020-12-26 RX ADMIN — Medication 10 ML: at 18:29

## 2020-12-26 RX ADMIN — CEFTRIAXONE 1 G: 1 INJECTION, POWDER, FOR SOLUTION INTRAMUSCULAR; INTRAVENOUS at 19:45

## 2020-12-26 NOTE — ED TRIAGE NOTES
Patient reports testing positive for covid 19 12/16 states she is on prednisone. states not helping much, on day 3. Patient states began having a fever this am, unsure of what the temperature was.

## 2020-12-26 NOTE — ED PROVIDER NOTES
73-year-old woman presenting with gradually worsening dyspnea. She was diagnosed 10 days ago with COVID-19, she had worsening dyspnea over the past 8 days, she started spiking fevers in the past 2. She has had persistent cough which has been nonproductive as well as poor appetite. She however denies any significant abdominal discomfort, leg swelling, hemoptysis, pleurisy. She presents today because she has had limitation of ADLs due to dyspnea and her pulse oximeter was reading in the 80s persistently over the past several days at times.            Past Medical History:   Diagnosis Date    Aneurysm [facility])     splenic artery aneurysm - no longer needs to be followed up - will never be a problem    Arthritis     back    Chronic pain     back    CKD (chronic kidney disease) stage 2, GFR 60-89 ml/min     Hyperlipidemia     Hypertension     Ill-defined condition     walks with a cane    Ill-defined condition     cardiac calcium test - some build up/stress test is \"fine\" per pt    Lumbar stenosis     Migraine     Other ill-defined conditions(799.89)     wheezing with allergies    Other ill-defined conditions(799.89)     GI bleed - stomach - NSAID use    Overactive bladder     PUD (peptic ulcer disease)     Stenosis of lumbosacral spine     Thromboembolus ([address]     PE       Past Surgical History:   Procedure Laterality Date    COLONOSCOPY N/A 10/1/2018    COLONOSCOPY performed by Stefany Schneider MD at [address] [place] [address] ENDOSCOPY    HX HYSTERECTOMY      HX ORTHOPAEDIC  2009    spinal fusion/has not had a lumbar laminectomy    HX ORTHOPAEDIC Bilateral     bunionectomy - bilateral once and unilateral once    HX ORTHOPAEDIC Bilateral     carpal tunnel release    HX TONSILLECTOMY      T & A    HX UROLOGICAL  2012    bladder repair         Family History:   Problem Relation Age of Onset    Cancer Father         bladder    Cancer Brother         BCCA    Stroke Mother     Cancer Maternal Uncle         leukemia  Breast Cancer Paternal Aunt     Stroke Maternal Grandmother     Colon Cancer Paternal Grandfather     Cancer Maternal Uncle         throat       Social History     Socioeconomic History    Marital status:      Spouse name: Not on file    Number of children: Not on file    Years of education: Not on file    Highest education level: Not on file   Occupational History    Not on file   Social Needs    Financial resource strain: Not on file    Food insecurity     Worry: Not on file     Inability: Not on file    Transportation needs     Medical: Not on file     Non-medical: Not on file   Tobacco Use    Smoking status: Never Smoker    Smokeless tobacco: Never Used   Substance and Sexual Activity    Alcohol use: Yes     Comment: very, very rare    Drug use: No    Sexual activity: Yes     Partners: Male   Lifestyle    Physical activity     Days per week: Not on file     Minutes per session: Not on file    Stress: Not on file   Relationships    Social connections     Talks on phone: Not on file     Gets together: Not on file     Attends [employer] service: Not on file     Active member of club or organization: Not on file     Attends meetings of clubs or organizations: Not on file     Relationship status: Not on file    Intimate partner violence     Fear of current or ex partner: Not on file     Emotionally abused: Not on file     Physically abused: Not on file     Forced sexual activity: Not on file   Other Topics Concern    Not on file   Social History Narrative    Not on file         ALLERGIES: Nitrofurantoin; Nsaids (non-steroidal anti-inflammatory drug); Other plant, animal, environmental; Tolmetin; and Toradol [ketorolac tromethamine]    Review of Systems   Constitutional: Positive for chills and fatigue. Negative for fever. Eyes: Negative for photophobia. Respiratory: Positive for cough and shortness of breath. Cardiovascular: Negative for chest pain and leg swelling. Gastrointestinal: Positive for abdominal pain and nausea. Genitourinary: Negative for dysuria. Musculoskeletal: Negative for back pain. Neurological: Positive for light-headedness. Negative for headaches. Psychiatric/Behavioral: Negative for confusion. All other systems reviewed and are negative. Vitals:    12/26/20 1137 12/26/20 1236 12/26/20 1241 12/26/20 1345   BP: 136/82   127/83   Pulse: 85 74 86 75   Resp: 22 21 22 18   Temp: 98.3 °F (36.8 °C)      SpO2: 93% 95% 92% 92%   Weight: 72.6 kg (160 lb)      Height: 5' 1\" (1.549 m)               Physical Exam  Constitutional:       General: She is not in acute distress. Appearance: She is not ill-appearing or toxic-appearing. HENT:      Head: Normocephalic. Nose: Nose normal.      Mouth/Throat:      Mouth: Mucous membranes are dry. Eyes:      Pupils: Pupils are equal, round, and reactive to light. Neck:      Musculoskeletal: Normal range of motion. Cardiovascular:      Pulses: Normal pulses. Pulmonary:      Breath sounds: No stridor. Comments: Mild tachypnea, appears to be somewhat dyspneic on exam.  Due to using PAPR unable to auscultate. Abdominal:      General: Abdomen is flat. Musculoskeletal: Normal range of motion. Right lower leg: No edema. Left lower leg: No edema. Skin:     General: Skin is warm. Capillary Refill: Capillary refill takes less than 2 seconds. Neurological:      General: No focal deficit present. Mental Status: She is alert and oriented to person, place, and time. Psychiatric:         Mood and Affect: Mood normal.         Behavior: Behavior normal.          MDM  Number of Diagnoses or Management Options    Perfect Serve Consult for Admission  3:10 PM    ED Room Number: KS52/29  Patient Name and age:  Jose Vargas 65 y.o.  female  Working Diagnosis:   1. COVID-19    2. Hypoxia    3.  Dyspnea on exertion        COVID-19 Suspicion:  yes  Sepsis present:  no  Reassessment needed: no  Code Status:  Full Code  Readmission: no  Isolation Requirements:  yes  Recommended Level of Care:  med/surg  Department:[facility] ED - [phone]  Other: 64-year-old with COVID-19, persistently below 93% in the emergency department, in the 80s at home. Dyspneic with minimal exertion. She has multilobar pneumonia on CT. There is no evidence of pulmonary embolism. Remains criteria for severe disease based on oxygenation. Higher risk based on age, comorbids.     ED Course as of Dec 26 1508   Sat Dec 26, 2020   1311 EK:50 PMNormal sinus rhythm, jugular 73, normal axis, no ST ovation or depression.    [NS]      ED Course User Index  [NS] Pillo Quiroz MD       Procedures

## 2020-12-26 NOTE — PROGRESS NOTES
[facility]: MED RECONCILIATION    Comments/Recommendations:    Medication information was provided by the patient.  Last doses of medications were taken this morning.  Steroid taper: today is was day 3 of 3 of the taper. The second dose was taken.  Patient has her own supply of Relpax    Medication(s) ADDED to PTA list:  1. none    Medication(s) REMOVED from PTA list:  1. none    Medication(s) ADJUSTED on PTA list:  1. Ambien dose was decreased to 5 mg (from 10 mg)      Allergies: Nitrofurantoin; Nsaids (non-steroidal anti-inflammatory drug); Other plant, animal, environmental; Tolmetin; and Toradol [ketorolac tromethamine]    Prior to Admission Medications:     Prior to Admission Medications   Prescriptions Last Dose Informant Patient Reported? Taking? KRILL OIL PO 2020 at Unknown time  Yes Yes   Sig: Take 500 mg by mouth daily. Lactobacillus acidophilus (ACIDOPHILUS PO) 2020 at Unknown time  Yes Yes   Sig: Take 1 Cap by mouth daily. REPATHA SURECLICK pen injection   Yes Yes   Si mg Once every 2 weeks. acetaminophen (TYLENOL EXTRA STRENGTH PO)   Yes Yes   Sig: Take 500 mg by mouth as needed. Takes 2 po as needed for leg or back pain. alendronate (FOSAMAX) 70 mg tablet 2020 at Unknown time  No Yes   Sig: Take 1 Tab by mouth every seven (7) days. cetirizine (ZYRTEC) 10 mg tablet 2020 at Unknown time  Yes Yes   Sig: Take 10 mg by mouth daily. cholecalciferol, vitamin D3, 2,000 unit tab 2020 at Unknown time  Yes Yes   Sig: Take 2,000 Units by mouth daily. eletriptan (RELPAX) 40 mg tablet 2020 at Unknown time  Yes Yes   Sig: Take 40 mg by mouth once as needed.  may repeat in 2 hours if necessary   ezetimibe (ZETIA) 10 mg tablet 2020 at Unknown time  No Yes   Sig: TAKE 1 TABLET BY MOUTH DAILY   gemfibroziL (LOPID) 600 mg tablet 2020 at Unknown time  No Yes   Sig: TAKE 1 TABLET BY MOUTH TWICE DAILY   metoprolol tartrate (LOPRESSOR) 25 mg tablet 12/26/2020 at Unknown time  No Yes   Sig: TAKE 1 TABLET BY MOUTH TWICE DAILY   multivitamin (MULTIPLE VITAMINS PO) 12/26/2020 at Unknown time  Yes Yes   Sig: Take 1 Tab by mouth daily. predniSONE (DELTASONE) 20 mg tablet 12/26/2020 at Unknown time  No Yes   Sig: Take 60 mg by mouth two (2) times a day for 3 days, THEN 40 mg two (2) times a day for 3 days, THEN 40 mg daily for 3 days, THEN 20 mg daily for 3 days. THEN 1/2 TAB X 3DAYS   topiramate (TOPAMAX) 200 mg tablet 12/26/2020 at Unknown time  Yes Yes   Sig: Take 200 mg by mouth two (2) times a day. turmeric (CURCUMIN) 12/26/2020 at Unknown time  Yes Yes   Sig: Take 500 mg by mouth daily. zolpidem (AMBIEN) 5 mg tablet   Yes Yes   Sig: Take 5 mg by mouth nightly as needed for Sleep.       Facility-Administered Medications: None              Duane David, PHARMD

## 2020-12-26 NOTE — ED NOTES
TRANSFER - OUT REPORT:    Verbal report given to kevin rn(name) on Tootie Sood  being transferred to [facility](unit) for routine progression of care       Report consisted of patients Situation, Background, Assessment and   Recommendations(SBAR). Information from the following report(s) ED Summary was reviewed with the receiving nurse. Lines:   Peripheral IV 12/26/20 Right Antecubital (Active)        Opportunity for questions and clarification was provided.       Patient transported with:   Monitor

## 2020-12-26 NOTE — H&P
[address] [facility] [address] [place] [address]   Office [phone]  Fax [phone]       Admission H&P     Name: Onofre Parmar MRN: [id number]  Sex: Female   YOB: 1950  Age: 65 y.o. PCP: Los Garces MD     Source of Information: patient, medical records    Chief complaint: Worsening COVID    History of Present Illness  Onofre Parmar is a 65 y.o. female with PMHx of HTN, HLD, lumbar spinal stenosis, osteoporosis, H/o GI bleed, migraine headaches who presents to the ED complaining of worsening dyspnea and hypoxia after being diagnosed with COVID 10 days ago. Patients friend tested positive for COVID recently and patient was having mild cough and sneezing, promting her to get tested 10 days ago at urgent care where she was positive. She was given an albuterol inhaler and told to purchase a pulse oximeter. She was given instructions to go the ED if her O2 sat drops below 94%. She had been improving since then, with resolution of her cough and sneezing, when 3 days ago she had a virtual visit with Dr Brittaney Amor who prescribed a prednisone taper. Patient acutely worsened today (12/26) when she began feeling short of breath, spiked a reported fever of 100.4 and had oxygen saturation in the 70s on her home pulse oximeter prompting her to present to the ED. Otherwise she endorses loss of taste and smell for 7 days. No Hx of COPD, pulm fibrosis, MI, CVA, CHF    COVID Questions:   Experiencing any of the following symptoms: fever, chills, cough, SOB, diarrhea, URI symptoms. Any Sick contacts with fever, cough, diarrhea, SOB, URI symptoms. Traveled out of state or out of country. None  Lives with Spouse. Has been staying at home. Yes    In the ED:  Vitals: Temp 98.3   /82   HR 85   RR 22   SatO2  93% on RA  Labs: WBC 14.7, DD 0.94, LA 1.29, trop negx1, procal neg, CRP collected, DD 0.94, , ferritin pending, fibrinogen 635  Imaging: CXR L subsegmental atalectasis.  CTA: multilobar PNA, no PE, incidental cardiomegaly, mild T12 comression deformity  Treatment: none      Patient Vitals for the past 12 hrs:   Temp Pulse Resp BP SpO2   12/26/20 1430 -- 66 20 98/65 94 %   12/26/20 1345 -- 75 18 127/83 92 %   12/26/20 1241 -- 86 22 -- 92 %   12/26/20 1236 -- 74 21 -- 95 %   12/26/20 1137 98.3 °F (36.8 °C) 85 22 136/82 93 %       Review of Systems  Review of Systems   Constitutional: Negative for chills and fever. HENT: Negative for hearing loss and sneezing. Eyes: Negative for visual disturbance. Respiratory: Positive for shortness of breath and wheezing. Negative for cough. Cardiovascular: Negative for chest pain and leg swelling. Gastrointestinal: Negative for abdominal pain, nausea and vomiting. Genitourinary: Positive for frequency. Negative for dysuria. Skin: Negative for wound. Neurological: Negative for light-headedness. Psychiatric/Behavioral: Negative for agitation. Home Medications   Prior to Admission medications    Medication Sig Start Date End Date Taking? Authorizing Provider   predniSONE (DELTASONE) 20 mg tablet Take 60 mg by mouth two (2) times a day for 3 days, THEN 40 mg two (2) times a day for 3 days, THEN 40 mg daily for 3 days, THEN 20 mg daily for 3 days. THEN 1/2 TAB X 3DAYS 12/24/20 1/5/21  Rena Rose MD   alendronate (FOSAMAX) 70 mg tablet Take 1 Tab by mouth every seven (7) days. 12/8/20   Michael Henriquez MD   zolpidem (AMBIEN) 10 mg tablet Take 1 Tab by mouth nightly as needed for Sleep. Max Daily Amount: 10 mg. 10/12/20   Michael Henriquez MD   ezetimibe (ZETIA) 10 mg tablet TAKE 1 TABLET BY MOUTH DAILY 9/30/20   Michael Henriquez MD   gemfibroziL (LOPID) 600 mg tablet TAKE 1 TABLET BY MOUTH TWICE DAILY 9/30/20   Michael Henriquez MD   metoprolol tartrate (LOPRESSOR) 25 mg tablet TAKE 1 TABLET BY MOUTH TWICE DAILY 9/1/20   MD MAXIMINO Bojorquez pen injection 955 mg Once every 2 weeks.  10/30/19   Provider, Historical eletriptan (RELPAX) 20 mg tablet Take 40 mg by mouth once as needed. may repeat in 2 hours if necessary    Provider, Historical   KRILL OIL PO Take  by mouth. Provider, Historical   multivitamin (MULTIPLE VITAMINS PO) Take  by mouth. Provider, Historical   Lactobacillus acidophilus (ACIDOPHILUS PO) Take  by mouth. Provider, Historical   topiramate (TOPAMAX) 200 mg tablet Take 200 mg by mouth two (2) times a day. Provider, Historical   cholecalciferol, vitamin D3, 2,000 unit tab Take 2,000 Units by mouth daily. Provider, Historical   turmeric (CURCUMIN) Take 600 mg by mouth daily. Provider, Historical   acetaminophen (TYLENOL EXTRA STRENGTH PO) Take  by mouth. Takes 2 po as needed for leg or back pain. Provider, Historical   cetirizine (ZYRTEC) 10 mg tablet Take 10 mg by mouth daily. Provider, Historical       Allergies  Allergies   Allergen Reactions    Nitrofurantoin Other (comments)     LOW WBC - 2000    Nsaids (Non-Steroidal Anti-Inflammatory Drug) Other (comments)     GI bleed.  Other Plant, Animal, Environmental Other (comments)     Mold, dust, cats, dog allergies. ENVIRONMENTAL ALLERGIES.     Tolmetin Unknown (comments)    Toradol [Ketorolac Tromethamine] Rash       Past Medical History  Past Medical History:   Diagnosis Date    Aneurysm ([facility] [address]     splenic artery aneurysm - no longer needs to be followed up - will never be a problem    Arthritis     back    Chronic pain     back    CKD (chronic kidney disease) stage 2, GFR 60-89 ml/min     Hyperlipidemia     Hypertension     Ill-defined condition     walks with a cane    Ill-defined condition     cardiac calcium test - some build up/stress test is \"fine\" per pt    Lumbar stenosis     Migraine     Other ill-defined conditions(799.89)     wheezing with allergies    Other ill-defined conditions(799.89)     GI bleed - stomach - NSAID use    Overactive bladder     PUD (peptic ulcer disease)     Stenosis of lumbosacral spine     Thromboembolus ([address]     PE       Previous Hospitalization(s)  Past Surgical History:   Procedure Laterality Date    COLONOSCOPY N/A 10/1/2018    COLONOSCOPY performed by Aravind Madsen MD [facility]COPY    HX HYSTERECTOMY      HX ORTHOPAEDIC  2009    spinal fusion/has not had a lumbar laminectomy    HX ORTHOPAEDIC Bilateral     bunionectomy - bilateral once and unilateral once    HX ORTHOPAEDIC Bilateral     carpal tunnel release    HX TONSILLECTOMY      T & A    HX UROLOGICAL  2012    bladder repair       Family History  Family History   Problem Relation Age of Onset    Cancer Father         bladder    Cancer Brother         BCCA    Stroke Mother     Cancer Maternal Uncle         leukemia    Breast Cancer Paternal Aunt     Stroke Maternal Grandmother     Colon Cancer Paternal Grandfather     Cancer Maternal Uncle         throat       Social History  Alcohol history: Rarely  Smoking history: Non-smoker  Illicit drug history: Not at all  Living arrangement: patient lives with their spouse. Ambulates: With cane    Vital Signs  Visit Vitals  BP 98/65   Pulse 66   Temp 98.3 °F (36.8 °C)   Resp 20   Ht 5' 1\" (1.549 m)   Wt 160 lb (72.6 kg)   SpO2 94%   BMI 30.23 kg/m²       Physical Exam  General: No acute distress. Alert. Cooperative. Obese  female   Head: Normocephalic. Atraumatic. Eyes:              Conjunctiva pink. Sclera white. PERRL. Ears:              Hearing grossly intact. Nose:             Septum midline. Mucosa pink. No drainage. Throat: Mucosa pink. Moist mucous membranes. No tonsillar exudates or erythema. Palate movement equal bilaterally. Neck: Supple. Normal ROM. No stiffness. Respiratory: Good air movement in all lung fields. RUL with faint crackles. LLL with mild crackles. Cardiovascular: RRR. Normal S1,S2. No m/r/g. Pulses 2+ throughout. GI: + bowel sounds. Nontender. No rebound tenderness or guarding. Nondistended. Extremities: No LE edema. Distal pulses intact. Musculoskeletal: Full ROM in all extremities. Skin: Warm, dry. No rashes. Neuro: CN II-XII grossly intact. Laboratory Data  Recent Results (from the past 8 hour(s))   METABOLIC PANEL, COMPREHENSIVE    Collection Time: 12/26/20  1:00 PM   Result Value Ref Range    Sodium 139 136 - 145 mmol/L    Potassium 4.6 3.5 - 5.1 mmol/L    Chloride 110 (H) 97 - 108 mmol/L    CO2 23 21 - 32 mmol/L    Anion gap 6 5 - 15 mmol/L    Glucose 122 (H) 65 - 100 mg/dL    BUN 24 (H) 6 - 20 MG/DL    Creatinine 1.12 (H) 0.55 - 1.02 MG/DL    BUN/Creatinine ratio 21 (H) 12 - 20      GFR est AA 58 (L) >60 ml/min/1.73m2    GFR est non-AA 48 (L) >60 ml/min/1.73m2    Calcium 9.5 8.5 - 10.1 MG/DL    Bilirubin, total 0.3 0.2 - 1.0 MG/DL    ALT (SGPT) 23 12 - 78 U/L    AST (SGOT) 30 15 - 37 U/L    Alk. phosphatase 100 45 - 117 U/L    Protein, total 7.2 6.4 - 8.2 g/dL    Albumin 3.4 (L) 3.5 - 5.0 g/dL    Globulin 3.8 2.0 - 4.0 g/dL    A-G Ratio 0.9 (L) 1.1 - 2.2     CBC WITH AUTOMATED DIFF    Collection Time: 12/26/20  1:00 PM   Result Value Ref Range    WBC 14.7 (H) 3.6 - 11.0 K/uL    RBC 4.26 3.80 - 5.20 M/uL    HGB 12.4 11.5 - 16.0 g/dL    HCT 37.2 35.0 - 47.0 %    MCV 87.3 80.0 - 99.0 FL    MCH 29.1 26.0 - 34.0 PG    MCHC 33.3 30.0 - 36.5 g/dL    RDW 13.2 11.5 - 14.5 %    PLATELET 002 926 - 287 K/uL    MPV 11.3 8.9 - 12.9 FL    NRBC 0.0 0  WBC    ABSOLUTE NRBC 0.00 0.00 - 0.01 K/uL    NEUTROPHILS 87 (H) 32 - 75 %    LYMPHOCYTES 7 (L) 12 - 49 %    MONOCYTES 5 5 - 13 %    EOSINOPHILS 0 0 - 7 %    BASOPHILS 0 0 - 1 %    IMMATURE GRANULOCYTES 1 (H) 0.0 - 0.5 %    ABS. NEUTROPHILS 12.8 (H) 1.8 - 8.0 K/UL    ABS. LYMPHOCYTES 1.0 0.8 - 3.5 K/UL    ABS. MONOCYTES 0.7 0.0 - 1.0 K/UL    ABS. EOSINOPHILS 0.0 0.0 - 0.4 K/UL    ABS. BASOPHILS 0.0 0.0 - 0.1 K/UL    ABS. IMM.  GRANS. 0.2 (H) 0.00 - 0.04 K/UL    DF AUTOMATED     TROPONIN I    Collection Time: 12/26/20  1:00 PM   Result Value Ref Range    Troponin-I, Qt. <0.05 <0.05 ng/mL   LD    Collection Time: 12/26/20  1:00 PM   Result Value Ref Range     81 - 246 U/L   PROCALCITONIN    Collection Time: 12/26/20  1:00 PM   Result Value Ref Range    Procalcitonin <0.05 ng/mL   SAMPLES BEING HELD    Collection Time: 12/26/20  1:01 PM   Result Value Ref Range    SAMPLES BEING HELD 1BLDCS PURPLE AND BLUE     COMMENT        Add-on orders for these samples will be processed based on acceptable specimen integrity and analyte stability, which may vary by analyte. D DIMER    Collection Time: 12/26/20  1:01 PM   Result Value Ref Range    D-dimer 0.94 (H) 0.00 - 0.65 mg/L FEU   FIBRINOGEN    Collection Time: 12/26/20  1:01 PM   Result Value Ref Range    Fibrinogen 635 (H) 200 - 475 mg/dL   POC LACTIC ACID    Collection Time: 12/26/20  1:05 PM   Result Value Ref Range    Lactic Acid (POC) 1.29 0.40 - 2.00 mmol/L       Imaging  CXR Results  (Last 48 hours)               12/26/20 1322  XR CHEST PORT Final result    Impression:  IMPRESSION: Left basilar subsegmental atelectasis       Narrative:  EXAM: XR CHEST PORT       INDICATION: dyspnea, cough       COMPARISON: 6/9/2012       FINDINGS: A portable AP radiograph of the chest was obtained at 1315 hours. The   patient is on a cardiac monitor. Linear streaky changes are present at the left   lung base. The cardiac and mediastinal contours and pulmonary vascularity are   remarkable for mild tortuosity of the descending aorta. The bones and soft   tissues are grossly within normal limits. CT Results  (Last 48 hours)               12/26/20 1412  CTA CHEST W OR W WO CONT Final result    Impression:  IMPRESSION:        Multilobar pneumonia    No evidence for pulmonary embolism. Incidental cardiomegaly   Mild superior endplate N17 compression deformity, age indeterminate       Narrative:  EXAM: CTA CHEST W OR W WO CONT       INDICATION: Hypoxia, dyspnea,  + Covid 10 days ago. History of PE       COMPARISON: None. CONTRAST: 100 mL of Isovue-370. TECHNIQUE:    Precontrast  images were obtained to localize the volume for acquisition. Multislice helical CT arteriography was performed from the diaphragm to the   thoracic inlet during uneventful rapid bolus intravenous contrast   administration. Lung and soft tissue windows were generated. Coronal and   sagittal images were generated and 3D post processing consisting of coronal   maximum intensity images was performed. CT dose reduction was achieved through   use of a standardized protocol tailored for this examination and automatic   exposure control for dose modulation. FINDINGS:   Patchy diffuse bilateral airspace disease in all lobes. Cardiomegaly. The pulmonary arteries are well enhanced and no pulmonary emboli are identified. There is no mediastinal or hilar adenopathy or mass. The aorta enhances normally   without evidence of aneurysm or dissection. The visualized portions of the upper abdominal organs are remarkable for fatty   infiltration of the pancreas. Mild superior endplate X64 compression deformity. Assessment and Plan     Dani Hannon is a 65 y.o. female with a PMHx of HTN, HLD, lumbar spinal stenosis, osteoporosis, H/o GI bleed, migraine headaches who is admitted for COVID PNA. COVID PNA: Patient tested positive at an urgent care 10 days ago. CTA: multilobar PNA, no PE.  - Admit to tele  - VS per unit routine  - Patient not currently requiring O2, therefore will hold on pulm c/s  - Ask family member to bring COVID result from ED, otherwise order PCR  - Daily CRP/DD/Ferritin/LD*  - Daily Vitc/zinc  - Doxy BID (12/26)  - Will not start lipitor, see below    Urinary frequency: Patient presented with urinary frequency for 1 day.   - UA w/ reflex    Migraine: Stable on home topamax, eletriptan (pharm sub) PRN  -  Continue topamax  - Pharm to substitute eletriptan PRN     HTN: Continue lopressor 25 every day. POA /82    HLD: lipid panel 10/12 wnl. CT heart in 2016 w/ total calcium score of 222. Previously started on statin but discontinued due to LFT increases. - Hold Repatha, krill oil. - Continue zetia 10, gemfibrozil 600 every day. Allergies: Stable  - Continue zyrtec 10    Osteoporosis: At home on alendronate Q7d and vit D3 2000UI daily. Mild T12 compression deformity on CT, no previous evidence. - Hold alendronate  - Continue D3 2000UI every day     Spinal stenosis: S/p fusion in 2009. Stable. Patient ambulates with cane    Insomnia: Stable on home ambien  - Continue ambien 10    Obesity: The pt's Body mass index is 30.23 kg/m². - Encouraging lifestyle modifications and further follow up outpatient. FEN/GI - Cardiac diet. No fluids  Activity - Ambulate as tolerated  DVT prophylaxis - Lovenox  GI prophylaxis - Protonix  Fall prophylaxis - Not indicated at this time. Disposition - Admit to Remote Telemetry. Plan to d/c to Home. Consulting PT, OT and CM  Code Status - Full.  Discussed with Patient  Next of [facility] Name and Contact - Pavel Salomon,  Spouse - [phone]    Patient Ursula Ruggiero will be discussed with Dr. Gary Guillaume.    3:41 PM, 12/26/20  Ema Perry MD  Family Medicine Resident       For Billing    Chief Complaint   Patient presents with   [address] [facility] Problems  Date Reviewed: 10/12/2020    None

## 2020-12-27 ENCOUNTER — APPOINTMENT (OUTPATIENT)
Dept: GENERAL RADIOLOGY | Age: 70
DRG: 177 | End: 2020-12-27
Attending: STUDENT IN AN ORGANIZED HEALTH CARE EDUCATION/TRAINING PROGRAM
Payer: MEDICARE

## 2020-12-27 PROBLEM — U07.1 ACUTE HYPOXEMIC RESPIRATORY FAILURE DUE TO COVID-19 (HCC): Status: ACTIVE | Noted: 2020-12-26

## 2020-12-27 PROBLEM — G43.909 MIGRAINE: Status: ACTIVE | Noted: 2020-12-27

## 2020-12-27 PROBLEM — E66.9 OBESITY: Status: ACTIVE | Noted: 2020-12-27

## 2020-12-27 PROBLEM — M81.0 OSTEOPOROSIS: Status: ACTIVE | Noted: 2020-12-27

## 2020-12-27 PROBLEM — N39.0 UTI (URINARY TRACT INFECTION): Status: ACTIVE | Noted: 2020-12-27

## 2020-12-27 LAB
ANION GAP SERPL CALC-SCNC: 7 MMOL/L (ref 5–15)
BASOPHILS # BLD: 0 K/UL (ref 0–0.1)
BASOPHILS NFR BLD: 0 % (ref 0–1)
BUN SERPL-MCNC: 24 MG/DL (ref 6–20)
BUN/CREAT SERPL: 21 (ref 12–20)
CALCIUM SERPL-MCNC: 9.3 MG/DL (ref 8.5–10.1)
CHLORIDE SERPL-SCNC: 109 MMOL/L (ref 97–108)
CO2 SERPL-SCNC: 23 MMOL/L (ref 21–32)
COMMENT, HOLDF: NORMAL
CREAT SERPL-MCNC: 1.16 MG/DL (ref 0.55–1.02)
CRP SERPL-MCNC: 6.37 MG/DL (ref 0–0.6)
D DIMER PPP FEU-MCNC: 1.38 MG/L FEU (ref 0–0.65)
DIFFERENTIAL METHOD BLD: ABNORMAL
EOSINOPHIL # BLD: 0 K/UL (ref 0–0.4)
EOSINOPHIL NFR BLD: 0 % (ref 0–7)
ERYTHROCYTE [DISTWIDTH] IN BLOOD BY AUTOMATED COUNT: 13.2 % (ref 11.5–14.5)
FERRITIN SERPL-MCNC: 398 NG/ML (ref 8–252)
GLUCOSE SERPL-MCNC: 93 MG/DL (ref 65–100)
HCT VFR BLD AUTO: 40.1 % (ref 35–47)
HGB BLD-MCNC: 13.3 G/DL (ref 11.5–16)
IMM GRANULOCYTES # BLD AUTO: 0.1 K/UL (ref 0–0.04)
IMM GRANULOCYTES NFR BLD AUTO: 1 % (ref 0–0.5)
LDH SERPL L TO P-CCNC: 239 U/L (ref 81–246)
LYMPHOCYTES # BLD: 1.4 K/UL (ref 0.8–3.5)
LYMPHOCYTES NFR BLD: 11 % (ref 12–49)
MCH RBC QN AUTO: 29.2 PG (ref 26–34)
MCHC RBC AUTO-ENTMCNC: 33.2 G/DL (ref 30–36.5)
MCV RBC AUTO: 88.1 FL (ref 80–99)
MONOCYTES # BLD: 0.8 K/UL (ref 0–1)
MONOCYTES NFR BLD: 6 % (ref 5–13)
NEUTS SEG # BLD: 10.3 K/UL (ref 1.8–8)
NEUTS SEG NFR BLD: 82 % (ref 32–75)
NRBC # BLD: 0 K/UL (ref 0–0.01)
NRBC BLD-RTO: 0 PER 100 WBC
PLATELET # BLD AUTO: 347 K/UL (ref 150–400)
PMV BLD AUTO: 11.3 FL (ref 8.9–12.9)
POTASSIUM SERPL-SCNC: 3.9 MMOL/L (ref 3.5–5.1)
RBC # BLD AUTO: 4.55 M/UL (ref 3.8–5.2)
SAMPLES BEING HELD,HOLD: NORMAL
SODIUM SERPL-SCNC: 139 MMOL/L (ref 136–145)
WBC # BLD AUTO: 12.6 K/UL (ref 3.6–11)

## 2020-12-27 PROCEDURE — 74011000250 HC RX REV CODE- 250: Performed by: STUDENT IN AN ORGANIZED HEALTH CARE EDUCATION/TRAINING PROGRAM

## 2020-12-27 PROCEDURE — 74011250636 HC RX REV CODE- 250/636: Performed by: STUDENT IN AN ORGANIZED HEALTH CARE EDUCATION/TRAINING PROGRAM

## 2020-12-27 PROCEDURE — 85025 COMPLETE CBC W/AUTO DIFF WBC: CPT

## 2020-12-27 PROCEDURE — 82728 ASSAY OF FERRITIN: CPT

## 2020-12-27 PROCEDURE — 83615 LACTATE (LD) (LDH) ENZYME: CPT

## 2020-12-27 PROCEDURE — 99233 SBSQ HOSP IP/OBS HIGH 50: CPT | Performed by: FAMILY MEDICINE

## 2020-12-27 PROCEDURE — 97116 GAIT TRAINING THERAPY: CPT

## 2020-12-27 PROCEDURE — 36415 COLL VENOUS BLD VENIPUNCTURE: CPT

## 2020-12-27 PROCEDURE — 85379 FIBRIN DEGRADATION QUANT: CPT

## 2020-12-27 PROCEDURE — C9113 INJ PANTOPRAZOLE SODIUM, VIA: HCPCS | Performed by: STUDENT IN AN ORGANIZED HEALTH CARE EDUCATION/TRAINING PROGRAM

## 2020-12-27 PROCEDURE — 87040 BLOOD CULTURE FOR BACTERIA: CPT

## 2020-12-27 PROCEDURE — 80048 BASIC METABOLIC PNL TOTAL CA: CPT

## 2020-12-27 PROCEDURE — 74011250637 HC RX REV CODE- 250/637: Performed by: FAMILY MEDICINE

## 2020-12-27 PROCEDURE — 83605 ASSAY OF LACTIC ACID: CPT

## 2020-12-27 PROCEDURE — 74011000258 HC RX REV CODE- 258: Performed by: STUDENT IN AN ORGANIZED HEALTH CARE EDUCATION/TRAINING PROGRAM

## 2020-12-27 PROCEDURE — 97165 OT EVAL LOW COMPLEX 30 MIN: CPT | Performed by: OCCUPATIONAL THERAPIST

## 2020-12-27 PROCEDURE — 71045 X-RAY EXAM CHEST 1 VIEW: CPT

## 2020-12-27 PROCEDURE — 86140 C-REACTIVE PROTEIN: CPT

## 2020-12-27 PROCEDURE — 97530 THERAPEUTIC ACTIVITIES: CPT

## 2020-12-27 PROCEDURE — 74011250637 HC RX REV CODE- 250/637: Performed by: STUDENT IN AN ORGANIZED HEALTH CARE EDUCATION/TRAINING PROGRAM

## 2020-12-27 PROCEDURE — 97161 PT EVAL LOW COMPLEX 20 MIN: CPT

## 2020-12-27 PROCEDURE — 65660000000 HC RM CCU STEPDOWN

## 2020-12-27 RX ORDER — DEXAMETHASONE SODIUM PHOSPHATE 10 MG/ML
6 INJECTION INTRAMUSCULAR; INTRAVENOUS EVERY 24 HOURS
Status: DISCONTINUED | OUTPATIENT
Start: 2020-12-27 | End: 2020-12-29 | Stop reason: HOSPADM

## 2020-12-27 RX ORDER — RIZATRIPTAN BENZOATE 5 MG/1
5 TABLET, ORALLY DISINTEGRATING ORAL
Status: DISCONTINUED | OUTPATIENT
Start: 2020-12-27 | End: 2020-12-28

## 2020-12-27 RX ORDER — RIZATRIPTAN BENZOATE 5 MG/1
5 TABLET, ORALLY DISINTEGRATING ORAL
Status: COMPLETED | OUTPATIENT
Start: 2020-12-27 | End: 2020-12-27

## 2020-12-27 RX ADMIN — GEMFIBROZIL 600 MG: 600 TABLET ORAL at 10:06

## 2020-12-27 RX ADMIN — Medication 10 ML: at 21:11

## 2020-12-27 RX ADMIN — Medication 10 ML: at 16:06

## 2020-12-27 RX ADMIN — Medication 10 ML: at 05:51

## 2020-12-27 RX ADMIN — ZINC SULFATE 220 MG (50 MG) CAPSULE 1 CAPSULE: CAPSULE at 10:08

## 2020-12-27 RX ADMIN — Medication 500 MG: at 17:16

## 2020-12-27 RX ADMIN — METOPROLOL TARTRATE 25 MG: 25 TABLET, FILM COATED ORAL at 10:06

## 2020-12-27 RX ADMIN — DOXYCYCLINE 100 MG: 100 INJECTION, POWDER, LYOPHILIZED, FOR SOLUTION INTRAVENOUS at 17:14

## 2020-12-27 RX ADMIN — DOXYCYCLINE 100 MG: 100 INJECTION, POWDER, LYOPHILIZED, FOR SOLUTION INTRAVENOUS at 05:51

## 2020-12-27 RX ADMIN — ACETAMINOPHEN 650 MG: 325 TABLET ORAL at 21:10

## 2020-12-27 RX ADMIN — SODIUM CHLORIDE 40 MG: 9 INJECTION INTRAMUSCULAR; INTRAVENOUS; SUBCUTANEOUS at 10:02

## 2020-12-27 RX ADMIN — RIZATRIPTAN BENZOATE 5 MG: 5 TABLET, ORALLY DISINTEGRATING ORAL at 16:05

## 2020-12-27 RX ADMIN — ENOXAPARIN SODIUM 40 MG: 40 INJECTION SUBCUTANEOUS at 10:03

## 2020-12-27 RX ADMIN — EZETIMIBE 10 MG: 10 TABLET ORAL at 10:06

## 2020-12-27 RX ADMIN — TOPIRAMATE 200 MG: 100 TABLET, FILM COATED ORAL at 10:07

## 2020-12-27 RX ADMIN — Medication 500 MG: at 10:07

## 2020-12-27 RX ADMIN — Medication 2 TABLET: at 10:06

## 2020-12-27 RX ADMIN — CETIRIZINE HYDROCHLORIDE 10 MG: 10 TABLET, FILM COATED ORAL at 10:06

## 2020-12-27 RX ADMIN — CEFTRIAXONE 1 G: 1 INJECTION, POWDER, FOR SOLUTION INTRAMUSCULAR; INTRAVENOUS at 18:38

## 2020-12-27 RX ADMIN — DEXAMETHASONE SODIUM PHOSPHATE 6 MG: 10 INJECTION, SOLUTION INTRAMUSCULAR; INTRAVENOUS at 05:51

## 2020-12-27 RX ADMIN — METOPROLOL TARTRATE 25 MG: 25 TABLET, FILM COATED ORAL at 17:16

## 2020-12-27 RX ADMIN — GEMFIBROZIL 600 MG: 600 TABLET ORAL at 17:16

## 2020-12-27 RX ADMIN — TOPIRAMATE 200 MG: 100 TABLET, FILM COATED ORAL at 17:16

## 2020-12-27 NOTE — CONSULTS
Name: Jose J Rodriguez: Link Oliveira   : 1950 Admit Date: 2020   Phone: [phone]  Room: [facility]/   PCP: Sera Delgado MD  MRN: [id number]   Date: 2020  Code: Full Code          Chart and notes reviewed. Data reviewed. I review the patient's current medications in the medical record at each encounter. I have evaluated and examined the patient. HPI:    10:05 AM       History was obtained from patient and chart. I was asked by Jak Stinson MD to see Jose Vargas in consultation for a chief complaint of respiratory failure due to [place]. Mrs. Donaldo Mena is a pleasant 65year old female who presented to the [address] [place] [address] ED yesterday with worsening SOB and hypoxia. States she tested positive for COVID on . Reports riding in a car with a friend who had a cough on 12/10 and they did not have masks on the entire time. Her friend tested positive on  and patient began showing symptoms that evening. States she was evaluated at [facility] in [place] and given an albuterol inhaler and told to closely monitor her O2 sats. Her SOB and hypoxia continued to worsen and she reportedly had sats in the 70s yesterday on her home pulse oximeter. She denies history of lung disease. Does not use home O2 or inhalers at baseline. Reports history of PE in her 45s related to severe illness/inactivity. Chest CTA is personally visualized and report reviewed. There is Patchy diffuse bilateral airspace disease in all lobes. No evidence of PE.     WBC 12.6    CRP 6.37  Ferritin 357  PCT < 0.05  Trop < 0.05  D-dimer 1.38      Past Medical History:   Diagnosis Date    Aneurysm ([facility]     splenic artery aneurysm - no longer needs to be followed up - will never be a problem    Arthritis     back    Chronic pain     back    CKD (chronic kidney disease) stage 2, GFR 60-89 ml/min     Hyperlipidemia     Hypertension     Ill-defined condition     walks with a cane    Ill-defined condition     cardiac calcium test - some build up/stress test is \"fine\" per pt    Lumbar stenosis     Migraine     Other ill-defined conditions(799.89)     wheezing with allergies    Other ill-defined conditions(799.89)     GI bleed - stomach - NSAID use    Overactive bladder     PUD (peptic ulcer disease)     Stenosis of lumbosacral spine     Thromboembolus ([address]     PE       Past Surgical History:   Procedure Laterality Date    COLONOSCOPY N/A 10/1/2018    COL[facility] performed by [facility] at [facility]COPY    HX HYSTERECTOMY      HX ORTHOPAEDIC  2009    spinal fusion/has not had a lumbar laminectomy    HX ORTHOPAEDIC Bilateral     bunionectomy - bilateral once and unilateral once    HX ORTHOPAEDIC Bilateral     carpal tunnel release    HX TONSILLECTOMY      T & A    HX UROLOGICAL  2012    bladder repair       Family History   Problem Relation Age of Onset    Cancer Father         bladder    Cancer Brother         BCCA    Stroke Mother     Cancer Maternal Uncle         leukemia    Breast Cancer Paternal Aunt     Stroke Maternal Grandmother     Colon Cancer Paternal Grandfather     Cancer Maternal Uncle         throat       Social History     Tobacco Use    Smoking status: Never Smoker    Smokeless tobacco: Never Used   Substance Use Topics    Alcohol use: Yes     Comment: very, very rare       Allergies   Allergen Reactions    Nitrofurantoin Other (comments)     LOW WBC - 2000    Nsaids (Non-Steroidal Anti-Inflammatory Drug) Other (comments)     GI bleed.  Other Plant, Animal, Environmental Other (comments)     Mold, dust, cats, dog allergies. ENVIRONMENTAL ALLERGIES.     Statins-Hmg-Coa Reductase Inhibitors Other (comments)     LFT increases    Tolmetin Unknown (comments)    Toradol [Ketorolac Tromethamine] Rash       Current Facility-Administered Medications   Medication Dose Route Frequency    dexamethasone (PF) (DECADRON) 10 mg/mL injection 6 mg  6 mg IntraVENous Q24H    sodium chloride (NS) flush 5-40 mL  5-40 mL IntraVENous Q8H    sodium chloride (NS) flush 5-40 mL  5-40 mL IntraVENous PRN    acetaminophen (TYLENOL) tablet 650 mg  650 mg Oral Q6H PRN    Or    acetaminophen (TYLENOL) suppository 650 mg  650 mg Rectal Q6H PRN    polyethylene glycol (MIRALAX) packet 17 g  17 g Oral DAILY PRN    promethazine (PHENERGAN) tablet 12.5 mg  12.5 mg Oral Q6H PRN    Or    ondansetron (ZOFRAN) injection 4 mg  4 mg IntraVENous Q6H PRN    enoxaparin (LOVENOX) injection 40 mg  40 mg SubCUTAneous DAILY    pantoprazole (PROTONIX) 40 mg in 0.9% sodium chloride 10 mL injection  40 mg IntraVENous DAILY    ascorbic acid (vitamin C) (VITAMIN C) tablet 500 mg  500 mg Oral BID    zinc sulfate (ZINCATE) 220 (50) mg capsule 1 Cap  1 Cap Oral DAILY    zolpidem (AMBIEN) tablet 5 mg  5 mg Oral QHS PRN    topiramate (TOPAMAX) tablet 200 mg  200 mg Oral BID    metoprolol tartrate (LOPRESSOR) tablet 25 mg  25 mg Oral BID    gemfibroziL (LOPID) tablet 600 mg  600 mg Oral BID    ezetimibe (ZETIA) tablet 10 mg  10 mg Oral DAILY    eletriptan (RELPAX) tablet 40 mg (Patient Supplied)  40 mg Oral ONCE PRN    cetirizine (ZYRTEC) tablet 10 mg  10 mg Oral DAILY    doxycycline (VIBRAMYCIN) 100 mg in 0.9% sodium chloride (MBP/ADV) 100 mL MBP  100 mg IntraVENous Q12H    cholecalciferol (VITAMIN D3) (1000 Units /25 mcg) tablet 2 Tab  2,000 Units Oral DAILY    cefTRIAXone (ROCEPHIN) 1 g in sterile water (preservative free) 10 mL IV syringe  1 g IntraVENous Q24H         REVIEW OF SYSTEMS   12 point ROS negative except as stated in the HPI. Physical Exam:   Visit Vitals  /76 (BP 1 Location: Left arm, BP Patient Position: Post activity)   Pulse 83   Temp 99.2 °F (37.3 °C)   Resp 16   Ht 5' 1\" (1.549 m)   Wt 72.6 kg (160 lb)   SpO2 93%   BMI 30.23 kg/m²       General:  Alert, cooperative, no acute distress, appears stated age.    Head:  Normocephalic, without obvious abnormality, atraumatic. Eyes:  Conjunctivae/corneas clear. Nose: Nares normal. Septum midline. Mucosa normal.    Throat: Lips, mucosa, and tongue normal.    Neck: Supple, symmetrical, trachea midline, no adenopathy. Lungs:   Crackles bilaterally, L>R. No wheeze. Resp nonlabored at rest with O2 in place. Chest wall:  No tenderness or deformity. Heart:  Regular rate and rhythm, S1, S2 normal, no murmur, click, rub or gallop. Abdomen:   Soft, non-tender. Bowel sounds normal. No masses,  No organomegaly. Extremities: Extremities normal, atraumatic, no cyanosis or edema. Pulses: 2+ and symmetric all extremities. Skin: Skin color, texture, turgor normal. No rashes or lesions   Lymph nodes: Cervical, supraclavicular nodes normal.   Neurologic: Grossly nonfocal       Lab Results   Component Value Date/Time    Sodium 139 12/27/2020 01:29 AM    Potassium 3.9 12/27/2020 01:29 AM    Chloride 109 (H) 12/27/2020 01:29 AM    CO2 23 12/27/2020 01:29 AM    BUN 24 (H) 12/27/2020 01:29 AM    Creatinine 1.16 (H) 12/27/2020 01:29 AM    Glucose 93 12/27/2020 01:29 AM    Calcium 9.3 12/27/2020 01:29 AM    Magnesium 2.0 06/10/2012 04:45 AM    Phosphorus 1.4 (L) 06/11/2012 05:25 AM    Lactic acid 1.1 06/09/2012 08:35 AM       Lab Results   Component Value Date/Time    WBC 12.6 (H) 12/27/2020 01:29 AM    HGB 13.3 12/27/2020 01:29 AM    PLATELET 688 21/61/2061 01:29 AM    MCV 88.1 12/27/2020 01:29 AM       Lab Results   Component Value Date/Time    INR 1.2 (H) 10/11/2011 04:10 PM    aPTT 25.4 10/11/2011 04:10 PM    Alk.  phosphatase 100 12/26/2020 01:00 PM    Protein, total 7.2 12/26/2020 01:00 PM    Albumin 3.4 (L) 12/26/2020 01:00 PM    Globulin 3.8 12/26/2020 01:00 PM       Lab Results   Component Value Date/Time    Iron 121 06/10/2012 04:45 AM    TIBC 320 06/10/2012 04:45 AM    Iron % saturation 38 06/10/2012 04:45 AM    Ferritin 357 12/26/2020 01:00 PM       Lab Results   Component Value Date/Time    C-Reactive protein 6.37 (H) 12/27/2020 01:29 AM    RPR NONREACTIVE 10/12/2011 05:15 AM    TSH 1.860 09/18/2019 09:33 AM        No results found for: PH, PHI, PCO2, PCO2I, PO2, PO2I, HCO3, HCO3I, FIO2, FIO2I    Lab Results   Component Value Date/Time     (H) 06/09/2012 09:10 AM    CK-MB Index 1.8 06/09/2012 09:10 AM    Troponin-I, Qt. <0.05 12/26/2020 01:00 PM        Lab Results   Component Value Date/Time    Culture result: NO GROWTH 1 DAY 06/09/2012 09:00 AM    Culture result: NO GROWTH 6 DAYS 06/09/2012 08:45 AM    Culture result: NO GROWTH 2 DAYS 10/11/2011 05:55 PM       Lab Results   Component Value Date/Time    RPR NONREACTIVE 10/12/2011 05:15 AM    Hep B surface Ag Negative 10/19/2009 12:30 PM       Lab Results   Component Value Date/Time     (H) 06/09/2012 09:10 AM     10/11/2011 04:10 PM       Lab Results   Component Value Date/Time    Color YELLOW/STRAW 12/26/2020 05:59 PM    Appearance CLOUDY (A) 12/26/2020 05:59 PM    pH (UA) 6.5 12/26/2020 05:59 PM    Protein 30 (A) 12/26/2020 05:59 PM    Glucose Negative 12/26/2020 05:59 PM    Ketone Negative 12/26/2020 05:59 PM    Bilirubin Negative 12/26/2020 05:59 PM    Blood LARGE (A) 12/26/2020 05:59 PM    Urobilinogen 0.2 12/26/2020 05:59 PM    Nitrites Negative 12/26/2020 05:59 PM    Leukocyte Esterase LARGE (A) 12/26/2020 05:59 PM    WBC >100 (H) 12/26/2020 05:59 PM    RBC 20-50 12/26/2020 05:59 PM    Bacteria 3+ (A) 12/26/2020 05:59 PM       IMPRESSION  · Acute hypoxemic respiratory failure  · COVID pneumonia  · HTN  · Hx of PE    PLAN  · Continue O2 and wean as able to keep sats > 90%; not on home O2 at baseline and currently on 4L  · Check ambulatory oximetry closer to discharge  · Continue decadron  · Continue empiric doxy and ceftriaxone; f/u cultures  · Given that her first symptoms presented 13-14 days ago, she is outside the window for remdesivir to provide significant benefit.   · Add on prn Combivent  · Continue zinc and vit C  · BP control per primary team  · Lovenox/Protonix ppx  · Records requested from [facility]    Discussed with nursing. Thank you for allowing us to participate in the care of this patient. We will be happy to follow along in her progress with you.       Master Clark

## 2020-12-27 NOTE — PROGRESS NOTES
Shift Change:     Bedside and Verbal shift change report given to Luna Garcia (oncoming nurse) by An Bird (offgoing nurse). Report included the following information SBAR, Kardex and Recent Results. CHARTING IN DISASTER MODE    Shift Summary:    0130: O2 sats 87% at rest. 2L NC applied, Sats back up to 93%    0600: O2 titrated up to 4L to maintain sats above 91%. Pnt currently resting at 93%. Dr. Sana Winters notified. Shift Change:     Bedside and Verbal shift change report given to Clara CASTRO (oncoming nurse) by Luna Garcia (offgoing nurse). Report included the following information SBAR, Kardex and Recent Results.

## 2020-12-27 NOTE — ACP (ADVANCE CARE PLANNING)
Advance Care Planning     Advance Care Planning Activator (Inpatient)  Conversation Note      Date of ACP Conversation: 12/27/20     Conversation Conducted with:   Patient with Decision Making Capacity    ACP Activator: [id number] Decision Maker:  Yvon Panda and Jennifer Booth (anderson)    Current Designated Health Care Decision Maker:     If no Decision Maker listed above or available through scanned documents, then:    If no Authorized Decision Maker has previously been identified, then patient chooses Health Care Decision Maker:  \"Who would you like to name as your primary health care decision-maker? \"    Name: Yvon Panda   Relationship: Son Phone number: [phone]  Randy Adams this person be reached easily? \" YES  \"Who would you like to name as your back-up decision maker? \"   Name: Jennifer Leobardo    Relationship: Son Phone number: [phone]  Randy Adams this person be reached easily? \" YES      Care Preferences    Ventilation: \"If you were in your present state of health and suddenly became very ill and were unable to breathe on your own, what would your preference be about the use of a ventilator (breathing machine) if it were available to you? \"      If patient would desire the use of a ventilator (breathing machine), answer \"yes\", if not \"no\":yes    \"If your health worsens and it becomes clear that your chance of recovery is unlikely, what would your preference be about the use of a ventilator (breathing machine) if it were available to you? \"     Would the patient desire the use of a ventilator (breathing machine)? YES      Resuscitation  \"CPR works best to restart the heart when there is a sudden event, like a heart attack, in someone who is otherwise healthy. Unfortunately, CPR does not typically restart the heart for people who have serious health conditions or who are very sick. \"    \"In the event your heart stopped as a result of an underlying serious health condition, would you want attempts to be made to restart your heart (answer \"yes\" for attempt to resuscitate) or would you prefer a natural death (answer \"no\" for do not attempt to resuscitate)? \" yes      [x] Yes  [] No   Educated Patient / Reta Dumont regarding differences between Advance Directives and portable DNR orders. Length of ACP Conversation in minutes:      Conversation Outcomes:  [x] ACP discussion completed  [] Existing advance directive reviewed with patient; no changes to patient's previously recorded wishes     [] New Advance Directive completed   [] Portable Do Not Resuscitate prepared for Provider review and signature  [] POLST/POST/MOLST/MOST prepared for Provider review and signature      Follow-up plan:    [] Schedule follow-up conversation to continue planning  [] Referred individual to Provider for additional questions/concerns   [] Advised patient/agent/surrogate to review completed ACP document and update if needed with changes in condition, patient preferences or care setting     [] This note routed to one or more involved healthcare providers       Patient states she does have an AMD and wants everything done/full code, and her sons as her decision makers.      Gonzalez Roper, BS

## 2020-12-27 NOTE — PROGRESS NOTES
OCCUPATIONAL THERAPY EVALUATION/DISCHARGE  Patient: Alen Alston (46 y.o. female)  Date: 12/27/2020  Primary Diagnosis: Acute respiratory failure with hypoxia ([facility] [address] [J96.01]       Precautions:   Fall    ASSESSMENT  Based on the objective data described below, the patient presents with good safety awareness and no LOB following admission for acute respiratory failure with hypoxia due to Covid-19. She is at a mod I level with ADLs and functional mobility. She demonstrated mild CARTY on 4L O2 with sats 93% and greater throughout session. Educated pt on energy conservation techniques to improve her independence with all functional tasks and she verbalized good understanding. No further skilled OT required at this time. Current Level of Function (ADLs/self-care): mod I    Functional Outcome Measure: The patient scored 80/100 on the Barthel Index outcome measure. Other factors to consider for discharge: see above     PLAN :  Recommend with staff: Recommend with nursing patient to complete as able in order to maintain strength, endurance and independence: ADLs with supervision/setup, OOB to chair 3x/day and mobilizing to the bathroom for toileting with supervision assist. Thank you for your assistance. Recommendation for discharge: (in order for the patient to meet his/her long term goals)  No skilled occupational therapy/ follow up rehabilitation needs identified at this time. This discharge recommendation:  Has not yet been discussed the attending provider and/or case management    IF patient discharges home will need the following DME: none       SUBJECTIVE:   Patient stated I feel better.     OBJECTIVE DATA SUMMARY:   HISTORY:   Past Medical History:   Diagnosis Date    [facility]     splenic artery aneurysm - no longer needs to be followed up - will never be a problem    Arthritis     back    Chronic pain     back    CKD (chronic kidney disease) stage 2, GFR 60-89 ml/min     Hyperlipidemia  Hypertension     Ill-defined condition     walks with a cane    Ill-defined condition     cardiac calcium test - some build up/stress test is \"fine\" per pt    Lumbar stenosis     Migraine     Other ill-defined conditions(799.89)     wheezing with allergies    Other ill-defined conditions(799.89)     GI bleed - stomach - NSAID use    Overactive bladder     PUD (peptic ulcer disease)     Stenosis of lumbosacral spine     Thromboembolus ([address]     PE     Past Surgical History:   Procedure Laterality Date    COLONOSCOPY N/A 10/1/2018    [facility]yes [facility] ENDOSCOPY    HX HYSTERECTOMY      HX ORTHOPAEDIC  2009    spinal fusion/has not had a lumbar laminectomy    HX ORTHOPAEDIC Bilateral     bunionectomy - bilateral once and unilateral once    HX ORTHOPAEDIC Bilateral     carpal tunnel release    HX TONSILLECTOMY      T & A    HX UROLOGICAL  2012    bladder repair       Prior Level of Function/Environment/Context: Independent, amb with cane occassionally  Expanded or extensive additional review of patient history:   Home Situation  Home Environment: Private residence  # Steps to Enter: 6  Rails to Enter: Yes  Hand Rails : Bilateral  One/Two Story Residence: Two story  # of Interior Steps: 12  Interior Rails: Both  Living Alone: No  Support Systems: Child(sanchez), Family member(s), Spouse/Significant Other/Partner  Patient Expects to be Discharged to[de-identified] Private residence  Current DME Used/Available at Home: Cane, straight  Tub or Shower Type: Tub/Shower combination(sits in tub to bathe)    Hand dominance: Right    EXAMINATION OF PERFORMANCE DEFICITS:  Cognitive/Behavioral Status:  Neurologic State: Alert; Appropriate for age  Orientation Level: Oriented X4  Cognition: Appropriate decision making; Appropriate for age attention/concentration; Appropriate safety awareness; Follows commands  Perception: Appears intact  Perseveration: No perseveration noted  Safety/Judgement: Awareness of environment;Driving appropriateness; Fall prevention;Good awareness of safety precautions; Home safety; Insight into deficits    Hearing: Auditory  Auditory Impairment: None    Vision/Perceptual:    Acuity: Within Defined Limits    Corrective Lenses: Reading glasses    Range of Motion:  AROM: Within functional limits  PROM: Within functional limits                      Strength:  Strength: Generally decreased, functional                Coordination:  Coordination: Within functional limits  Fine Motor Skills-Upper: Left Intact; Right Intact    Gross Motor Skills-Upper: Left Intact; Right Intact    Tone & Sensation:  Tone: Normal  Sensation: Intact                      Balance:  Sitting: Intact  Standing: Intact    Functional Mobility and Transfers for ADLs:  Bed Mobility:  Rolling: Modified independent  Supine to Sit: Modified independent  Sit to Supine: Modified independent  Scooting: Modified independent    Transfers:  Sit to Stand: Modified independent  Stand to Sit: Modified independent  Bed to Chair: Modified independent  Bathroom Mobility: Modified independent  Toilet Transfer : Modified independent  Assistive Device : Gait Belt    ADL Assessment:  Feeding: Independent    Oral Facial Hygiene/Grooming: Modified Independent(standing at sink to wash hands)    Bathing: Modified independent(seated)    Upper Body Dressing: Independent    Lower Body Dressing: Modified independent    Toileting: Modified independent                ADL Intervention and task modifications:  Patient instructed and indicated understanding of energy conservation techniques to increase independence and safety during ADLs. Cognitive Retraining  Safety/Judgement: Awareness of environment;Driving appropriateness; Fall prevention;Good awareness of safety precautions; Home safety; Insight into deficits    Functional Measure:  Barthel Index:    Bathin  Bladder: 5  Bowels: 10  Groomin  Dressing: 10  Feeding: 10  Mobility: 5  Stairs: 5  Toilet Use: 10  Transfer (Bed to Chair and Back): 15  Total: 80/100        The Barthel ADL Index: Guidelines  1. The index should be used as a record of what a patient does, not as a record of what a patient could do. 2. The main aim is to establish degree of independence from any help, physical or verbal, however minor and for whatever reason. 3. The need for supervision renders the patient not independent. 4. A patient's performance should be established using the best available evidence. Asking the patient, friends/relatives and nurses are the usual sources, but direct observation and common sense are also important. However direct testing is not needed. 5. Usually the patient's performance over the preceding 24-48 hours is important, but occasionally longer periods will be relevant. 6. Middle categories imply that the patient supplies over 50 per cent of the effort. 7. Use of aids to be independent is allowed. Ritu Tadeo., Barthel, D.W. (2745). Functional evaluation: the Barthel Index. [facility] (14)2. NATHAN Polanco, Caitlin Amos, Karel Hernández., [place], [address] [place] [address] (1999). Measuring the change indisability after inpatient rehabilitation; comparison of the responsiveness of the Barthel Index and Functional [place] Measure. Journal of Neurology, Neurosurgery, and Psychiatry, 66(4), 767-324. KARISSA Barker, JESSICA Lozada, & Arlen Grady, MKamiA. (2004.) Assessment of post-stroke quality of life in cost-effectiveness studies: The usefulness of the Barthel Index and the EuroQoL-5D.  Quality of Life Research, 15, 251-77       Occupational Therapy Evaluation Charge Determination   History Examination Decision-Making   LOW Complexity : Brief history review  LOW Complexity : 1-3 performance deficits relating to physical, cognitive , or psychosocial skils that result in activity limitations and / or participation restrictions  LOW Complexity : No comorbidities that affect functional and no verbal or physical assistance needed to complete eval tasks       Based on the above components, the patient evaluation is determined to be of the following complexity level: LOW   Pain Ratin/10    Activity Tolerance:   Fair    After treatment patient left in no apparent distress:    Supine in bed and Call bell within reach    COMMUNICATION/EDUCATION:   The patients plan of care was discussed with: Registered nurse.      Thank you for this referral.  Shawn Merlos OT  Time Calculation: 14 mins

## 2020-12-27 NOTE — PROGRESS NOTES
12/27/2020  Reason for Admission:   Covid, acute hypoxemic respiratory failure                   RUR Score:          16% low           Plan for utilizing home health:       None at this time, patient has no history    PCP: First and Last name:  Massiel Dickey MD   Name of Practice:    Are you a current patient: Yes/No:  Yes   Approximate date of last visit: with the practice about a week ago (PCP on vacation, saw an associate)   Can you participate in a virtual visit with your PCP:  Yes                    Current Advanced Directive/Advance Care Plan:  Patient states she has an AMD, confirmed full code with sons as decision makers via ACP discussion. Transition of Care Plan:                    I completed the assessment with the patient over the phone due to covid   Patient lives with her  in a 2 story home with 4-5 steps to enter. Prior to admission she states she is independent with ADLs and drives. She has never had home health in the past and uses a cane to get around. Her  Reji Del Valle is her emergency contact and ride home. He can be reached at [phone]. She usually gets prescriptions at [place] [address] [address] and they are typically covered. She sees Dr. Cynthia Hernández as her PCP and last had a visit with an associate about a week ago. No discharge needs noted at this time, will follow for possible home O2 need. Transition of Care Plan  1. Continue medical management/treatment  2. Patient will likely go home with family assistance  3. May need home O2, FP is aware she will need a walking oximetry and orders  4.  will transport when ready   5. CM will continue to follow and assist with discharge planning    Care Management Interventions  PCP Verified by CM: Yes(Dr. Massiel Dickey)  Mode of Transport at Discharge:  Other (see comment)()  Transition of Care Consult (CM Consult): Discharge Planning  MyChart Signup: No  Discharge Durable Medical Equipment: No  Physical Therapy Consult: Yes  Occupational Therapy Consult: Yes  Speech Therapy Consult: No  Current Support Network: Lives with Spouse  Confirm Follow Up Transport: Family  Discharge Location  Discharge Placement: Home with family assistance    ENEDELIA Rob

## 2020-12-27 NOTE — PROGRESS NOTES
[address] [place] [address]   Office [phone]  Fax [phone]          Assessment and Plan   Alen Alston is a 65 y.o. female with a PMHx of HTN, HLD, lumbar spinal stenosis, osteoporosis, H/o GI bleed, migraine headaches who is admitted for COVID PNA. 24 Hour Events: new O2 requirement, started on decadron overnight      COVID PNA: Patient tested positive at an urgent care 10 days ago. CTA: multilobar PNA, no PE.  - Consult to pulm - ?remdesevir   - Continue decadron (12/27)  - Doxy BID (12/26)  - Daily Vitc/zinc  - Daily CRP/DD/Ferritin/LD    UTI: Patient presented with urinary frequency for 1 day. - Follow up UCx  - CTX (12/26)     Migraine: Stable on home topamax, eletriptan (pharm sub) PRN  -  Continue topamax  - Pharm to substitute eletriptan PRN     HTN: Continue lopressor 25 every day. POA /82     HLD: lipid panel 10/12 wnl. CT heart in 2016 w/ total calcium score of 222. Previously started on statin but discontinued due to LFT increases. - Hold Repatha, krill oil. - Continue zetia 10, gemfibrozil 600 every day. Allergies: Stable  - Continue zyrtec 10     Osteoporosis: At home on alendronate Q7d and vit D3 2000UI daily. Mild T12 compression deformity on CT, no previous evidence. - Hold alendronate  - Continue D3 2000UI every day     Spinal stenosis: S/p fusion in 2009. Stable. Patient ambulates with cane     Insomnia: Stable on home ambien  - Continue ambien 10     Obesity: The pt's Body mass index is 30.23 kg/m². - Encouraging lifestyle modifications and further follow up outpatient.        FEN/GI - Cardiac diet. No fluids  Activity - Ambulate as tolerated  DVT prophylaxis - Lovenox  GI prophylaxis - Protonix  Fall prophylaxis - Not indicated at this time. Disposition -  Plan to d/c to Home. Consulting PT, OT and CM  Code Status - Full.  Discussed with Patient  Next of [place] [address] Name and Contact - Ophelia Heimlich,  Spouse - [phone]    I appreciate the opportunity to participate in the care of this patient,  Anne Higginbotham MD  [facility] Medicine Resident         Subjective / Objective     Subjective   Patient reports she feels well with O2, slept at night, but felt short of breath. Denies HA, palpitations, abd pain, n/v.     Temp (24hrs), Av.7 °F (37.1 °C), Min:97.9 °F (36.6 °C), Max:100.3 °F (37.9 °C)     Objective     General: No acute distress. Alert. Cooperative. Obese  female   Respiratory: Poor air movement throughout: right worse than left. No wheezes, crackles, rales or rhonchi. Cardiovascular: RRR. Normal S1,S2. No m/r/g. Pulses 2+ throughout. GI: + bowel sounds. Nontender. No rebound tenderness or guarding. Nondistended. Extremities: No LE edema. Distal pulses intact. Musculoskeletal: Full ROM in all extremities. Skin: Warm, dry. No rashes.         Respiratory: O2 Flow Rate (L/min): 4 l/min O2 Device: Nasal cannula     I/O:  Date 20 - 20 0620 - 20 0659   Shift 3701-4973 0130-7578 24 Hour Total 3293-4263 7480-1160 24 Hour Total   INTAKE   P.O.  476 476        P.O.  476 476      I.V.(mL/kg/hr)  0(0) 0(0)        Volume (cefTRIAXone (ROCEPHIN) 1 g in sterile water (preservative free) 10 mL IV syringe)  0 0        Volume (doxycycline (VIBRAMYCIN) 100 mg in 0.9% sodium chloride (MBP/ADV) 100 mL MBP)  0 0      Shift Total(mL/kg)  476(6.6) 476(6.6)      OUTPUT   Urine(mL/kg/hr)           Urine Occurrence(s)  2 x 2 x      Shift Total(mL/kg)         NET  476 476      Weight (kg) 72.6 72.6 72.6 72.6 72.6 72.6       Inpatient Medications  Current Facility-Administered Medications   Medication Dose Route Frequency    dexamethasone (PF) (DECADRON) 10 mg/mL injection 6 mg  6 mg IntraVENous Q24H    sodium chloride (NS) flush 5-40 mL  5-40 mL IntraVENous Q8H    sodium chloride (NS) flush 5-40 mL  5-40 mL IntraVENous PRN    acetaminophen (TYLENOL) tablet 650 mg  650 mg Oral Q6H PRN    Or    acetaminophen (TYLENOL) suppository 650 mg  650 mg Rectal Q6H PRN    polyethylene glycol (MIRALAX) packet 17 g  17 g Oral DAILY PRN    promethazine (PHENERGAN) tablet 12.5 mg  12.5 mg Oral Q6H PRN    Or    ondansetron (ZOFRAN) injection 4 mg  4 mg IntraVENous Q6H PRN    enoxaparin (LOVENOX) injection 40 mg  40 mg SubCUTAneous DAILY    pantoprazole (PROTONIX) 40 mg in 0.9% sodium chloride 10 mL injection  40 mg IntraVENous DAILY    ascorbic acid (vitamin C) (VITAMIN C) tablet 500 mg  500 mg Oral BID    zinc sulfate (ZINCATE) 220 (50) mg capsule 1 Cap  1 Cap Oral DAILY    zolpidem (AMBIEN) tablet 5 mg  5 mg Oral QHS PRN    topiramate (TOPAMAX) tablet 200 mg  200 mg Oral BID    metoprolol tartrate (LOPRESSOR) tablet 25 mg  25 mg Oral BID    gemfibroziL (LOPID) tablet 600 mg  600 mg Oral BID    ezetimibe (ZETIA) tablet 10 mg  10 mg Oral DAILY    eletriptan (RELPAX) tablet 40 mg (Patient Supplied)  40 mg Oral ONCE PRN    cetirizine (ZYRTEC) tablet 10 mg  10 mg Oral DAILY    doxycycline (VIBRAMYCIN) 100 mg in 0.9% sodium chloride (MBP/ADV) 100 mL MBP  100 mg IntraVENous Q12H    cholecalciferol (VITAMIN D3) (1000 Units /25 mcg) tablet 2 Tab  2,000 Units Oral DAILY    cefTRIAXone (ROCEPHIN) 1 g in sterile water (preservative free) 10 mL IV syringe  1 g IntraVENous Q24H         Allergies  Allergies   Allergen Reactions    Nitrofurantoin Other (comments)     LOW WBC - 2000    Nsaids (Non-Steroidal Anti-Inflammatory Drug) Other (comments)     GI bleed.  Other Plant, Animal, Environmental Other (comments)     Mold, dust, cats, dog allergies. ENVIRONMENTAL ALLERGIES.     Statins-Hmg-Coa Reductase Inhibitors Other (comments)     LFT increases    Tolmetin Unknown (comments)    Toradol [Ketorolac Tromethamine] Rash         CBC:  Recent Labs     12/27/20  0129 12/26/20  1300   WBC 12.6* 14.7*   HGB 13.3 12.4   HCT 40.1 37.2    243       Metabolic Panel:  Recent Labs     12/27/20  0129 12/26/20  1300    139   K 3.9 4.6 * 110*   CO2 23 23   BUN 24* 24*   CREA 1.16* 1.12*   GLU 93 122*   CA 9.3 9.5   ALB  --  3.4*   ALT  --  23              For Billing    Chief Complaint   Patient presents with   [facility] Problems  Date Reviewed: 10/12/2020          Codes Class Noted POA    UTI (urinary tract infection) ICD-10-CM: N39.0  ICD-9-CM: 599.0  12/27/2020 Unknown        Migraine ICD-10-CM: Q73.456  ICD-9-CM: 346.90  12/27/2020 Unknown        Osteoporosis ICD-10-CM: M81.0  ICD-9-CM: 733.00  12/27/2020 Unknown        Obesity ICD-10-CM: E66.9  ICD-9-CM: 278.00  12/27/2020 Unknown        * (Principal) Acute hypoxemic respiratory failure due to COVID-19 [facility]) ICD-10-CM: U07.1, J96.01  ICD-9-CM: 518.81, 079.89, 799.02  12/26/2020         HTN (hypertension) ICD-10-CM: I10  ICD-9-CM: 401.9  Unknown Unknown        Hyperlipidemia ICD-10-CM: E78.5  ICD-9-CM: 272.4  Unknown Yes

## 2020-12-27 NOTE — PROGRESS NOTES
[address] [facility] [address] [place] [address] [facility]   Office [phone]  Fax [phone] [address] [facility] Residency Attending Addendum:  I saw and evaluated the patient on the day of the encounter with Dr. Arun Slater, performing the key elements of the service. I discussed the findings, assessment and plan with the resident and agree with the resident's findings and plan as documented in the resident's note. Patient febrile overnight. Patient lying comfortably at bedside. Continue antibiotics. Pending final urine culture. Having access issues and plan for midline today. Robb Hernandez MD, FAAFP, CAQSM, RMSK           Assessment and Plan   Frankie Doty is a 65 y.o. female with a PMHx of HTN, HLD, lumbar spinal stenosis, osteoporosis, H/o GI bleed, migraine headaches who is admitted for COVID PNA. Overnight events: Patient required 4L of O2 overnight, currently on 6L. Spiked a fever of 102.5 overnight. Received tylenol with no other febrile events overnight. CXR was ordered and showed increasing bilat LL atalectasis. Blood culture was collected and LA ordered and was wnl. No changes made to management. Telemetry reviewed: NSR between the 60s and 80s     COVID PNA: Patient tested positive at an urgent care 10 days ago. CTA: multilobar PNA, no PE.  - Consult to pulm  - No remdesivir, outside of window of tx   - Continue decadron (12/27)  - Doxy BID (12/26)  - Daily Vitc/zinc  - Daily CRP/DD/Ferritin/LD  - Combivent respimat    UTI: Patient presented with urinary frequency for 1 day. - UCx growing [employer]  - CTX (12/26)     Migraine: Stable on home topamax, eletriptan PRN  -  Continue topamax  - Rizatriptan PRN    HTN: Continue lopressor 25 every day. POA /82     HLD: lipid panel 10/12 wnl. CT heart in 2016 w/ total calcium score of 222. Previously started on statin but discontinued due to LFT increases. - Hold Repatha, krill oil.   - Continue zetia 10, gemfibrozil 600 every day.    Allergies: Stable  - Continue zyrtec 10     Osteoporosis: At home on alendronate Q7d and vit D3 2000UI daily. Mild T12 compression deformity on CT, no previous evidence. - Hold alendronate  - Continue D3 2000UI every day     Spinal stenosis: S/p fusion in . Stable. Patient ambulates with cane     Insomnia: Stable on home ambien  - Continue ambien 10     Obesity: The pt's Body mass index is 30.23 kg/m². - Encouraging lifestyle modifications and further follow up outpatient.        FEN/GI - Cardiac diet. No fluids  Activity - Ambulate as tolerated  DVT prophylaxis - Lovenox  GI prophylaxis - Protonix  Fall prophylaxis - Not indicated at this time. Disposition -  Plan to d/c to Home. Consulting PT, OT and CM  Code Status - Full. Discussed with Patient  Next of Danya [place] Name and Contact - Dajuan Yusuf,  Spouse - [phone]    I appreciate the opportunity to participate in the care of this patient,  Rosalie Vaughan MD  Family Medicine Resident         Subjective / Objective     Subjective   Patient reports she feels very short of breath. Denies HA, palpitations, abd pain, n/v.     Temp (24hrs), Av °F (37.2 °C), Min:98.1 °F (36.7 °C), Max:100.3 °F (37.9 °C)     Objective     General: No acute distress. Alert. Cooperative. Obese  female   Respiratory: Good air movement throughout. No wheezes, crackles, rales or rhonchi. Cardiovascular: RRR. Normal S1,S2. No m/r/g. Pulses 2+ throughout. GI: + bowel sounds. Nontender. No rebound tenderness or guarding. Nondistended. Extremities: No LE edema. Distal pulses intact. Musculoskeletal: Full ROM in all extremities. Skin: Warm, dry. No rashes. Respiratory: O2 Flow Rate (L/min): 4 l/min O2 Device: Nasal cannula     I/O:  Date 20 - 20 - 2059   Shift  24 Hour Total 6282-5828 9567-7153 24 Hour Total   INTAKE   P.O.  476 640 742  680     P. O.  476 556 965 285   I. V.(mL/kg/hr)  0(0) 0(0) Volume (cefTRIAXone (ROCEPHIN) 1 g in sterile water (preservative free) 10 mL IV syringe)  0 0        Volume (doxycycline (VIBRAMYCIN) 100 mg in 0.9% sodium chloride (MBP/ADV) 100 mL MBP)  0 0      Shift Total(mL/kg)  476(6.6) 476(6.6) 680(9.4)  680(9.4)   OUTPUT   Urine(mL/kg/hr)           Urine Occurrence(s)  2 x 2 x 1 x  1 x   Stool           Stool Occurrence(s)    1 x  1 x   Shift Total(mL/kg)         NET  476 476 680  680   Weight (kg) 72.6 72.6 72.6 72.6 72.6 72.6       Inpatient Medications  Current Facility-Administered Medications   Medication Dose Route Frequency    dexamethasone (PF) (DECADRON) 10 mg/mL injection 6 mg  6 mg IntraVENous Q24H    ipratropium-albuterol (COMBIVENT RESPIMAT) 20 mcg-100 mcg inhalation spray  1 Puff Inhalation Q4H PRN    sodium chloride (NS) flush 5-40 mL  5-40 mL IntraVENous Q8H    sodium chloride (NS) flush 5-40 mL  5-40 mL IntraVENous PRN    acetaminophen (TYLENOL) tablet 650 mg  650 mg Oral Q6H PRN    Or    acetaminophen (TYLENOL) suppository 650 mg  650 mg Rectal Q6H PRN    polyethylene glycol (MIRALAX) packet 17 g  17 g Oral DAILY PRN    promethazine (PHENERGAN) tablet 12.5 mg  12.5 mg Oral Q6H PRN    Or    ondansetron (ZOFRAN) injection 4 mg  4 mg IntraVENous Q6H PRN    enoxaparin (LOVENOX) injection 40 mg  40 mg SubCUTAneous DAILY    pantoprazole (PROTONIX) 40 mg in 0.9% sodium chloride 10 mL injection  40 mg IntraVENous DAILY    ascorbic acid (vitamin C) (VITAMIN C) tablet 500 mg  500 mg Oral BID    zinc sulfate (ZINCATE) 220 (50) mg capsule 1 Cap  1 Cap Oral DAILY    zolpidem (AMBIEN) tablet 5 mg  5 mg Oral QHS PRN    topiramate (TOPAMAX) tablet 200 mg  200 mg Oral BID    metoprolol tartrate (LOPRESSOR) tablet 25 mg  25 mg Oral BID    gemfibroziL (LOPID) tablet 600 mg  600 mg Oral BID    ezetimibe (ZETIA) tablet 10 mg  10 mg Oral DAILY    cetirizine (ZYRTEC) tablet 10 mg  10 mg Oral DAILY    doxycycline (VIBRAMYCIN) 100 mg in 0.9% sodium chloride (MBP/ADV) 100 mL MBP  100 mg IntraVENous Q12H    cholecalciferol (VITAMIN D3) (1000 Units /25 mcg) tablet 2 Tab  2,000 Units Oral DAILY    cefTRIAXone (ROCEPHIN) 1 g in sterile water (preservative free) 10 mL IV syringe  1 g IntraVENous Q24H         Allergies  Allergies   Allergen Reactions    Nitrofurantoin Other (comments)     LOW WBC - 2000    Nsaids (Non-Steroidal Anti-Inflammatory Drug) Other (comments)     GI bleed.  Other Plant, Animal, Environmental Other (comments)     Mold, dust, cats, dog allergies. ENVIRONMENTAL ALLERGIES.     Statins-Hmg-Coa Reductase Inhibitors Other (comments)     LFT increases    Tolmetin Unknown (comments)    Toradol [Ketorolac Tromethamine] Rash         CBC:  Recent Labs     12/27/20  0129 12/26/20  1300   WBC 12.6* 14.7*   HGB 13.3 12.4   HCT 40.1 37.2    927       Metabolic Panel:  Recent Labs     12/27/20  0129 12/26/20  1300    139   K 3.9 4.6   * 110*   CO2 23 23   BUN 24* 24*   CREA 1.16* 1.12*   GLU 93 122*   CA 9.3 9.5   ALB  --  3.4*   ALT  --  23              For Billing    Chief Complaint   Patient presents with   [address] [place] [address] Problems  Date Reviewed: 10/12/2020          Codes Class Noted POA    UTI (urinary tract infection) ICD-10-CM: N39.0  ICD-9-CM: 599.0  12/27/2020 Unknown        Migraine ICD-10-CM: O11.165  ICD-9-CM: 346.90  12/27/2020 Unknown        Osteoporosis ICD-10-CM: M81.0  ICD-9-CM: 733.00  12/27/2020 Unknown        Obesity ICD-10-CM: E66.9  ICD-9-CM: 278.00  12/27/2020 Unknown        * (Principal) Acute hypoxemic respiratory failure due to COVID-19 [facility]) ICD-10-CM: U07.1, J96.01  ICD-9-CM: 518.81, 079.89, 799.02  12/26/2020         HTN (hypertension) ICD-10-CM: I10  ICD-9-CM: 401.9  Unknown Unknown        Hyperlipidemia ICD-10-CM: E78.5  ICD-9-CM: 272.4  Unknown Yes

## 2020-12-27 NOTE — PROGRESS NOTES
5967 Bedside and Verbal shift change report given to Clara Jones RN (oncoming nurse) by Rico Zheng RN (offgoing nurse). Report included the following information SBAR and Kardex. 1045 Patient informed  would have to bring relplax from home. 26 Updated son Thien Deleon on patient condition. 1723 Patient complained of chills, temp 100.0    This patient was assisted with Intentional Toileting every 2 hours during this shift. Documentation of ambulation and output reflected on Flowsheet. 3875 Bedside and Verbal shift change report given to Al Fabian RN (oncoming nurse) by Clara Kauffman RN (offgoing nurse). Report included the following information SBAR and Kardex.

## 2020-12-27 NOTE — PROGRESS NOTES
PHYSICAL THERAPY EVALUATION/DISCHARGE  Patient: Hollie Marcelino (37 y.o. female)  Date: 12/27/2020  Primary Diagnosis: Acute respiratory failure with hypoxia ([facility] [address] [J96.01]       Precautions:   Fall covid-19 positive      ASSESSMENT  Based on the objective data described below, the patient presents with modified  independent with ambulation without assistive device and independent with all functional mobility. Patient ad hollie in the room but limited shortness of breath with activity on 4 liters oxygen however recover quickly when prompted to take deep breath. Educate patient OOB as much as possible reviewed energy conservations and purse lip breathing verbalized understanding. Reviewed all safety precaution and home exercise program with the patient, verbalized understanding. Skilled physical therapy is not indicated at this time. Functional Outcome Measure: The patient scored 80/100 on the barthel index outcome measure     Other factors to consider for discharge: covid result was positive     Further skilled acute physical therapy is not indicated at this time. PLAN :  Recommendation for discharge: (in order for the patient to meet his/her long term goals)  No skilled physical therapy/ follow up rehabilitation needs identified at this time. This discharge recommendation:  Has been made in collaboration with the attending provider and/or case management    IF patient discharges home will need the following DME: none       SUBJECTIVE:   Patient stated Eufemia Pop.     OBJECTIVE DATA SUMMARY:   HISTORY:    Past Medical History:   Diagnosis Date    Aneurysm ([facility] [address]     splenic artery aneurysm - no longer needs to be followed up - will never be a problem    Arthritis     back    Chronic pain     back    CKD (chronic kidney disease) stage 2, GFR 60-89 ml/min     Hyperlipidemia     Hypertension     Ill-defined condition     walks with a cane    Ill-defined condition     cardiac calcium test - some build up/stress test is \"fine\" per pt    Lumbar stenosis     Migraine     Other ill-defined conditions(799.89)     wheezing with allergies    Other ill-defined conditions(799.89)     GI bleed - stomach - NSAID use    Overactive bladder     PUD (peptic ulcer disease)     Stenosis of lumbosacral spine     Thromboembolus ([address]     PE     Past Surgical History:   Procedure Laterality Date    COLONOSCOPY N/A 10/1/2018    COLONOSCOPY performed by Stefany Schneider MD at [address] HX HYSTERECTOMY      HX ORTHOPAEDIC  2009    spinal fusion/has not had a lumbar laminectomy    HX ORTHOPAEDIC Bilateral     bunionectomy - bilateral once and unilateral once    HX ORTHOPAEDIC Bilateral     carpal tunnel release    HX TONSILLECTOMY      T & A    HX UROLOGICAL  2012    bladder repair       Prior level of function: Independent community ambulator without assistive device. Personal factors and/or comorbidities impacting plan of care:     Home Situation  Home Environment: Private residence  # Steps to Enter: 6  Rails to Enter: Yes  Hand Rails : Bilateral  One/Two Story Residence: Two story  # of Interior Steps: 12  Interior Rails: Both  Living Alone: No  Support Systems: Child(sanchez), Family member(s), Spouse/Significant Other/Partner  Patient Expects to be Discharged to[de-identified] Private residence  Current DME Used/Available at Home: Cane, straight  Tub or Shower Type: Tub/Shower combination(sits in tub to bathe)    EXAMINATION/PRESENTATION/DECISION MAKING:   Critical Behavior:  Neurologic State: Alert, Appropriate for age  Orientation Level: Oriented X4  Cognition: Appropriate decision making, Appropriate for age attention/concentration, Appropriate safety awareness, Follows commands  Safety/Judgement: Awareness of environment, Driving appropriateness, Fall prevention, Good awareness of safety precautions, Home safety, Insight into deficits  Hearing:   Auditory  Auditory Impairment: None    Range Of Motion:  AROM: Within functional limits PROM: Within functional limits           Strength:    Strength: Generally decreased, functional                    Tone & Sensation:   Tone: Normal              Sensation: Intact               Coordination:  Coordination: Within functional limits  Vision:   Acuity: Within Defined Limits  Corrective Lenses: Reading glasses  Functional Mobility:  Bed Mobility:  Rolling: Modified independent  Supine to Sit: Modified independent  Sit to Supine: Modified independent  Scooting: Modified independent  Transfers:  Sit to Stand: Modified independent  Stand to Sit: Modified independent  Stand Pivot Transfers: Modified independent     Bed to Chair: Modified independent              Balance:   Sitting: Intact  Standing: Intact; Without support  Ambulation/Gait Training:  Distance (ft): 50 Feet (ft)(ad hollie in the covid room)     Ambulation - Level of Assistance: Modified independent     Gait Description (WDL): Within defined limits                                      Therapeutic Exercises:    Instructed patient to continue active range of motion exercise on both legs while up on chair or on bed. Functional Measure:  Barthel Index:    Bathin  Bladder: 5  Bowels: 10  Groomin  Dressing: 10  Feeding: 10  Mobility: 5  Stairs: 5  Toilet Use: 10  Transfer (Bed to Chair and Back): 15  Total: 80/100       The Barthel ADL Index: Guidelines  1. The index should be used as a record of what a patient does, not as a record of what a patient could do. 2. The main aim is to establish degree of independence from any help, physical or verbal, however minor and for whatever reason. 3. The need for supervision renders the patient not independent. 4. A patient's performance should be established using the best available evidence. Asking the patient, friends/relatives and nurses are the usual sources, but direct observation and common sense are also important. However direct testing is not needed.   5. Usually the patient's performance over the preceding 24-48 hours is important, but occasionally longer periods will be relevant. 6. Middle categories imply that the patient supplies over 50 per cent of the effort. 7. Use of aids to be independent is allowed. Armin Coil., Barthel, D.W. (4099). Functional evaluation: the Barthel Index. [address] [facility] (14)2. Odell Irizarry brian NATHAN Rodriguez, Kathy Garcia., Daandrew [place]., Rosette Sero, [address] (). Measuring the change indisability after inpatient rehabilitation; comparison of the responsiveness of the Barthel Index and Functional [place] Measure. Journal of Neurology, Neurosurgery, and Psychiatry, 66(4), 948-022. Andres Murguia, [place].MICHAEL, JESSICA Lozada, & Martina Case M.A. (2004.) Assessment of post-stroke quality of life in cost-effectiveness studies: The usefulness of the Barthel Index and the EuroQoL-5D. Quality of Life Research, 15, 146-70          Physical Therapy Evaluation Charge Determination   History Examination Presentation Decision-Making   LOW Complexity : Zero comorbidities / personal factors that will impact the outcome / POC LOW Complexity : 1-2 Standardized tests and measures addressing body structure, function, activity limitation and / or participation in recreation  LOW Complexity : Stable, uncomplicated  Other outcome measures barthel  LOW       Based on the above components, the patient evaluation is determined to be of the following complexity level: LOW     Pain Ratin/110    Activity Tolerance:   Good      After treatment patient left in no apparent distress:   Sitting in chair, Supine in bed, Heels elevated for pressure relief, Call bell within reach and Bed / chair alarm activated    COMMUNICATION/EDUCATION:   The patients plan of care was discussed with: Occupational therapist and Registered nurse. Fall prevention education was provided and the patient/caregiver indicated understanding. Thank you for this referral.  Huston Siemens, PT,WCC. Time Calculation: 28 mins

## 2020-12-28 ENCOUNTER — APPOINTMENT (OUTPATIENT)
Dept: GENERAL RADIOLOGY | Age: 70
DRG: 177 | End: 2020-12-28
Attending: STUDENT IN AN ORGANIZED HEALTH CARE EDUCATION/TRAINING PROGRAM
Payer: MEDICARE

## 2020-12-28 LAB
ANION GAP SERPL CALC-SCNC: 6 MMOL/L (ref 5–15)
ATRIAL RATE: 73 BPM
BUN SERPL-MCNC: 23 MG/DL (ref 6–20)
BUN/CREAT SERPL: 22 (ref 12–20)
CALCIUM SERPL-MCNC: 9.4 MG/DL (ref 8.5–10.1)
CALCULATED P AXIS, ECG09: 7 DEGREES
CALCULATED R AXIS, ECG10: -12 DEGREES
CALCULATED T AXIS, ECG11: 28 DEGREES
CHLORIDE SERPL-SCNC: 109 MMOL/L (ref 97–108)
CO2 SERPL-SCNC: 24 MMOL/L (ref 21–32)
CREAT SERPL-MCNC: 1.05 MG/DL (ref 0.55–1.02)
CRP SERPL-MCNC: 16.4 MG/DL (ref 0–0.6)
D DIMER PPP FEU-MCNC: 1.3 MG/L FEU (ref 0–0.65)
DIAGNOSIS, 93000: NORMAL
FERRITIN SERPL-MCNC: 443 NG/ML (ref 26–388)
GLUCOSE SERPL-MCNC: 132 MG/DL (ref 65–100)
LACTATE SERPL-SCNC: 1 MMOL/L (ref 0.4–2)
LDH SERPL L TO P-CCNC: 247 U/L (ref 81–246)
P-R INTERVAL, ECG05: 174 MS
POTASSIUM SERPL-SCNC: 3.5 MMOL/L (ref 3.5–5.1)
Q-T INTERVAL, ECG07: 396 MS
QRS DURATION, ECG06: 80 MS
QTC CALCULATION (BEZET), ECG08: 436 MS
SODIUM SERPL-SCNC: 139 MMOL/L (ref 136–145)
VENTRICULAR RATE, ECG03: 73 BPM

## 2020-12-28 PROCEDURE — 74011250636 HC RX REV CODE- 250/636: Performed by: STUDENT IN AN ORGANIZED HEALTH CARE EDUCATION/TRAINING PROGRAM

## 2020-12-28 PROCEDURE — 83615 LACTATE (LD) (LDH) ENZYME: CPT

## 2020-12-28 PROCEDURE — 99232 SBSQ HOSP IP/OBS MODERATE 35: CPT | Performed by: FAMILY MEDICINE

## 2020-12-28 PROCEDURE — C9113 INJ PANTOPRAZOLE SODIUM, VIA: HCPCS | Performed by: STUDENT IN AN ORGANIZED HEALTH CARE EDUCATION/TRAINING PROGRAM

## 2020-12-28 PROCEDURE — 36415 COLL VENOUS BLD VENIPUNCTURE: CPT

## 2020-12-28 PROCEDURE — 74011000258 HC RX REV CODE- 258: Performed by: STUDENT IN AN ORGANIZED HEALTH CARE EDUCATION/TRAINING PROGRAM

## 2020-12-28 PROCEDURE — 80048 BASIC METABOLIC PNL TOTAL CA: CPT

## 2020-12-28 PROCEDURE — 2709999900 HC NON-CHARGEABLE SUPPLY

## 2020-12-28 PROCEDURE — 65660000000 HC RM CCU STEPDOWN

## 2020-12-28 PROCEDURE — 94760 N-INVAS EAR/PLS OXIMETRY 1: CPT

## 2020-12-28 PROCEDURE — 76937 US GUIDE VASCULAR ACCESS: CPT

## 2020-12-28 PROCEDURE — 86140 C-REACTIVE PROTEIN: CPT

## 2020-12-28 PROCEDURE — 77010033678 HC OXYGEN DAILY

## 2020-12-28 PROCEDURE — 74011000250 HC RX REV CODE- 250: Performed by: STUDENT IN AN ORGANIZED HEALTH CARE EDUCATION/TRAINING PROGRAM

## 2020-12-28 PROCEDURE — 74011250637 HC RX REV CODE- 250/637: Performed by: STUDENT IN AN ORGANIZED HEALTH CARE EDUCATION/TRAINING PROGRAM

## 2020-12-28 RX ORDER — RIZATRIPTAN BENZOATE 5 MG/1
5 TABLET, ORALLY DISINTEGRATING ORAL ONCE
Status: COMPLETED | OUTPATIENT
Start: 2020-12-28 | End: 2020-12-28

## 2020-12-28 RX ADMIN — METOPROLOL TARTRATE 25 MG: 25 TABLET, FILM COATED ORAL at 18:23

## 2020-12-28 RX ADMIN — EZETIMIBE 10 MG: 10 TABLET ORAL at 09:44

## 2020-12-28 RX ADMIN — TOPIRAMATE 200 MG: 100 TABLET, FILM COATED ORAL at 09:44

## 2020-12-28 RX ADMIN — GEMFIBROZIL 600 MG: 600 TABLET ORAL at 09:44

## 2020-12-28 RX ADMIN — Medication 500 MG: at 09:44

## 2020-12-28 RX ADMIN — CETIRIZINE HYDROCHLORIDE 10 MG: 10 TABLET, FILM COATED ORAL at 09:44

## 2020-12-28 RX ADMIN — Medication 10 ML: at 05:58

## 2020-12-28 RX ADMIN — ZINC SULFATE 220 MG (50 MG) CAPSULE 1 CAPSULE: CAPSULE at 09:44

## 2020-12-28 RX ADMIN — TOPIRAMATE 200 MG: 100 TABLET, FILM COATED ORAL at 18:23

## 2020-12-28 RX ADMIN — DEXAMETHASONE SODIUM PHOSPHATE 6 MG: 10 INJECTION, SOLUTION INTRAMUSCULAR; INTRAVENOUS at 05:57

## 2020-12-28 RX ADMIN — ENOXAPARIN SODIUM 40 MG: 40 INJECTION SUBCUTANEOUS at 09:44

## 2020-12-28 RX ADMIN — GEMFIBROZIL 600 MG: 600 TABLET ORAL at 18:23

## 2020-12-28 RX ADMIN — Medication 10 ML: at 21:04

## 2020-12-28 RX ADMIN — Medication 10 ML: at 14:46

## 2020-12-28 RX ADMIN — METOPROLOL TARTRATE 25 MG: 25 TABLET, FILM COATED ORAL at 09:44

## 2020-12-28 RX ADMIN — RIZATRIPTAN BENZOATE 5 MG: 5 TABLET, ORALLY DISINTEGRATING ORAL at 11:57

## 2020-12-28 RX ADMIN — DOXYCYCLINE 100 MG: 100 INJECTION, POWDER, LYOPHILIZED, FOR SOLUTION INTRAVENOUS at 05:57

## 2020-12-28 RX ADMIN — Medication 2 TABLET: at 09:44

## 2020-12-28 RX ADMIN — SODIUM CHLORIDE 40 MG: 9 INJECTION INTRAMUSCULAR; INTRAVENOUS; SUBCUTANEOUS at 05:57

## 2020-12-28 RX ADMIN — CEFTRIAXONE 1 G: 1 INJECTION, POWDER, FOR SOLUTION INTRAMUSCULAR; INTRAVENOUS at 21:03

## 2020-12-28 RX ADMIN — DOXYCYCLINE 100 MG: 100 INJECTION, POWDER, LYOPHILIZED, FOR SOLUTION INTRAVENOUS at 18:22

## 2020-12-28 RX ADMIN — Medication 500 MG: at 18:23

## 2020-12-28 NOTE — PROGRESS NOTES
12/28/2020  1:18 PM    Reason for Admission:   Covid,  acute hypoxemic respiratory failure                   RUR Score:          16% low    Transition of Care Plan  1. Continue medical management/treatment  2. Patient will likely go home with family assistance  3. May need home O2, FP is aware she will need a walking oximetry and orders  4.  will transport when ready   5. CM will continue to follow and assist with discharge planning      Patient required 4L of O2 overnight, currently on 6L. Spiked a fever of 102.5 overnight. Received tylenol with no other febrile events overnight. CXR was ordered and showed increasing bilat LL atalectasis. Pt not eligible for remdesivir, outside of window of tx.      Arian Martin

## 2020-12-28 NOTE — PROGRESS NOTES
MIDLINE PLACEMENT NOTE    Midline catheters are NOT central lines. Approved midline products are peripheral infusion devices with the tip terminating in the arm at or below the axillary vein, distal to the shoulder. The tip DOES NOT ENTER THE CENTRAL VASCULATURE. A Midline is for reliable short term (less than 30 days) vascular access. PRE-PROCEDURE VERIFICATION  Correct Procedure: yes  Correct Site:  yes  Temperature: Temp: 98.4 °F (36.9 °C), Temperature Source: Temp Source: Oral  Recent Labs     12/27/20  0129   BUN 24*   CREA 1.16*      WBC 12.6*     Allergies: Nitrofurantoin; Nsaids (non-steroidal anti-inflammatory drug); Other plant, animal, environmental; Statins-hmg-coa reductase inhibitors; Tolmetin; and Toradol [ketorolac tromethamine]  Education materials for Midline Care given: yes. See Patient Education activity for further details. Midline Booklet placed at bedside: yes    Midline Catheter Maintenance: For complete information reference Policy # [id number]    A. Dressing Changes       1. Assess the dressing in the first 24 hours for accumulation of blood, fluid or moisture beneath the dressing. During dressing changes, assess the external length of the catheter to determine if migration of the catheter has occurred. Periodically confirm catheter placement, tip location, patency and security of dressing. Dressing changes should be done every 7 days, and PRN using sterile technique if integrity of dressing is compromised. Apply new dressing per hospital policy. Caution: Do not use scissors to remove dressing to minimize the risk of cutting the  Catheter. B. Flushing       1. Flush each lumen of the catheter with 10 mL of sterile saline every 12 hours or after each use. In addition, lock each lumen of the catheter with sterile saline. Note: Flush with 20 mL of sterile saline after blood therapy   Caution: DO NOT USE A SYRINGE SMALLER THAN 10 mL TO FLUSH AND CONFIRM PATENCY.   Patency should be assessed with a 10 mL or larger syringe and  sterile saline. Upon confirmation of patency, administration of medication should be  given in a syringe appropriately sized for the dose. Do not infuse against resistance. C. Blood Draws:        1. Stop infusion, draw off and waste 10 ml of blood. Draw sample with 10 mL syringe or          greater. DO NOT USE VACUTAINER . Transfer with appropriate device to lab tubes. Flush        with 20 ml NS.  D. Occluded or Partially Occluded Catheter       1. Catheters that present resistance to flushing and aspiration may be partially or completely  occluded. Do not flush against resistance. PROCEDURE DETAIL:    Verbal consent was obtained and all questions were answered related to risks and benefits. A Midline was inserted as a sterile procedure using ultrasound and Modified Seldinger Technique for vascular access. The following documentation is in addition to the Midline properties in the lines/airways Doc Flow Sheet:  Lot #: [id number]  Lidocaine 1% administered intradermally :yes  Internal Catheter Total Length: 10 (cm)   External catheter length: 5 (cm)  Vein Selection for Midline:left basilic  Sterile CVC Insertion Bundle was used to place line yes  Complication Related to Insertion:no    Prior to use, line patency MUST be verified by flushing at least a 10 mL syringe. Line should flush easily without resistance, and withdrawal of brisk blood return to verify patency.     Line is okay to use: yes        (Remove Midline by:     1/26/21         )  Gege Lopes RN

## 2020-12-28 NOTE — PROGRESS NOTES
1900 Bedside and Verbal shift change report given to Urban Colby Cabrera (oncoming nurse) by Clara CASTRO (offgoing nurse). Report included the following information SBAR, Kardex, MAR and Recent Results. 2050 Notified Dr. Radford via telephone regarding pt's temp 102.5 F. Received VORB okay to give Tylenol. No further orders for labs. 2110 Patient received Tylenol 650 mg po for fever. 2200 Bedside and Verbal shift change report given to Edwina Xiao (oncoming nurse) by Sunday Cynthia CASTRO (offgoing nurse). Report included the following information SBAR, Kardex and MAR.

## 2020-12-28 NOTE — PROGRESS NOTES
0720 Bedside and Verbal shift change report given to Clara Kauffman RN (oncoming nurse) by Shayy Bartlett RN (offgoing nurse). Report included the following information SBAR and Kardex.     1015 Called Dr Lopes, need migraine medication for patient had a migraine. Will enter orders, also no IV and had ultrasound guided IV leaking and removed, attempted 2 time unsuccessful. Called to ED and ICU no one available to do ultrasound guided at this time.    1025 Dr De Los Santos notifed no one available from ICU or ED for line placement, states will place order for midline.    This patient was assisted with Intentional Toileting every 2 hours during this shift.  Documentation of ambulation and output reflected on Flowsheet.     1433 TRANSFER - OUT REPORT:    Verbal report given to Valeriy CASTRO (name) on Courtney Khan  being transferred to 4th floor (unit) for routine progression of care       Report consisted of patient’s Situation, Background, Assessment and   Recommendations(SBAR).     Information from the following report(s) SBAR, Kardex and Cardiac Rhythm NSR was reviewed with the receiving nurse.    Lines:       Opportunity for questions and clarification was provided.      Patient transported with:   Monitor  O2 @ 3 liters  Patient-specific medications from Pharmacy  Registered Nurse  Tech

## 2020-12-28 NOTE — PROGRESS NOTES
Bedside and Verbal shift change report given to April (oncoming nurse) by Sharlene Luevano (offgoing nurse). Report included the following information SBAR, Kardex and Recent Results.

## 2020-12-28 NOTE — PROGRESS NOTES
Name: Mago Tanner: [address]   : 1950 Admit Date: 2020   Phone: [phone]  Room: [facility]/01   PCP: Chandrakant Vega MD  MRN: [id number]   Date: 2020  Code: Full Code          Chart and notes reviewed. Data reviewed. I review the patient's current medications in the medical record at each encounter. I have evaluated and examined the patient. HPI:    Mrs. Mal Olivo is a pleasant 65year old female who presented to the [address] [place] [address] ED yesterday with worsening SOB and hypoxia. States she tested positive for COVID on . Reports riding in a car with a friend who had a cough on 12/10 and they did not have masks on the entire time. Her friend tested positive on  and patient began showing symptoms that evening. States she was evaluated at [facility] in [place] and given an albuterol inhaler and told to closely monitor her O2 sats. Her SOB and hypoxia continued to worsen and she reportedly had sats in the 70s yesterday on her home pulse oximeter. She denies history of lung disease. Does not use home O2 or inhalers at baseline. Reports history of PE in her 45s related to severe illness/inactivity. Chest CTA is personally visualized and report reviewed. There is Patchy diffuse bilateral airspace disease in all lobes. No evidence of PE. Overnight Events:  Febrile overnight  BP and HR stable  Oxygen sats 96% on 3L NC   WBC trending down  [facility]  NGTD  UTI positive  CXR  increasing atx lower lobes    ROS: Slightly improved today. Still SOB on minimal exertion. Some cough. No wheezing or chest pain.      Past Medical History:   Diagnosis Date    Aneurysm [facility])     splenic artery aneurysm - no longer needs to be followed up - will never be a problem    Arthritis     back    Chronic pain     back    CKD (chronic kidney disease) stage 2, GFR 60-89 ml/min     Hyperlipidemia     Hypertension     Ill-defined condition     walks with a cane    Ill-defined condition     cardiac calcium test - some build up/stress test is \"fine\" per pt    Lumbar stenosis     Migraine     Other ill-defined conditions(799.89)     wheezing with allergies    Other ill-defined conditions(799.89)     GI bleed - stomach - NSAID use    Overactive bladder     PUD (peptic ulcer disease)     Stenosis of lumbosacral spine     Thromboembolus ([address]     PE       Past Surgical History:   Procedure Laterality Date    COLONOSCOPY N/A 10/1/2018    COLONOSCOPY performed by Rob Arias MD at [facility] ENDOSCOPY    HX HYSTERECTOMY      HX ORTHOPAEDIC  2009    spinal fusion/has not had a lumbar laminectomy    HX ORTHOPAEDIC Bilateral     bunionectomy - bilateral once and unilateral once    HX ORTHOPAEDIC Bilateral     carpal tunnel release    HX TONSILLECTOMY      T & A    HX UROLOGICAL  2012    bladder repair       Family History   Problem Relation Age of Onset    Cancer Father         bladder    Cancer Brother         BCCA    Stroke Mother     Cancer Maternal Uncle         leukemia    Breast Cancer Paternal Aunt     Stroke Maternal Grandmother     Colon Cancer Paternal Grandfather     Cancer Maternal Uncle         throat       Social History     Tobacco Use    Smoking status: Never Smoker    Smokeless tobacco: Never Used   Substance Use Topics    Alcohol use: Yes     Comment: very, very rare       Allergies   Allergen Reactions    Nitrofurantoin Other (comments)     LOW WBC - 2000    Nsaids (Non-Steroidal Anti-Inflammatory Drug) Other (comments)     GI bleed.  Other Plant, Animal, Environmental Other (comments)     Mold, dust, cats, dog allergies. ENVIRONMENTAL ALLERGIES.     Statins-Hmg-Coa Reductase Inhibitors Other (comments)     LFT increases    Tolmetin Unknown (comments)    Toradol [Ketorolac Tromethamine] Rash       Current Facility-Administered Medications   Medication Dose Route Frequency    dexamethasone (PF) (DECADRON) 10 mg/mL injection 6 mg  6 mg IntraVENous Q24H    ipratropium-albuterol (COMBIVENT RESPIMAT) 20 mcg-100 mcg inhalation spray  1 Puff Inhalation Q4H PRN    rizatriptan (MAXALT-MLT) rapid dissolve tablet 5 mg  5 mg Oral ONCE PRN    sodium chloride (NS) flush 5-40 mL  5-40 mL IntraVENous Q8H    sodium chloride (NS) flush 5-40 mL  5-40 mL IntraVENous PRN    acetaminophen (TYLENOL) tablet 650 mg  650 mg Oral Q6H PRN    Or    acetaminophen (TYLENOL) suppository 650 mg  650 mg Rectal Q6H PRN    polyethylene glycol (MIRALAX) packet 17 g  17 g Oral DAILY PRN    promethazine (PHENERGAN) tablet 12.5 mg  12.5 mg Oral Q6H PRN    Or    ondansetron (ZOFRAN) injection 4 mg  4 mg IntraVENous Q6H PRN    enoxaparin (LOVENOX) injection 40 mg  40 mg SubCUTAneous DAILY    pantoprazole (PROTONIX) 40 mg in 0.9% sodium chloride 10 mL injection  40 mg IntraVENous DAILY    ascorbic acid (vitamin C) (VITAMIN C) tablet 500 mg  500 mg Oral BID    zinc sulfate (ZINCATE) 220 (50) mg capsule 1 Cap  1 Cap Oral DAILY    zolpidem (AMBIEN) tablet 5 mg  5 mg Oral QHS PRN    topiramate (TOPAMAX) tablet 200 mg  200 mg Oral BID    metoprolol tartrate (LOPRESSOR) tablet 25 mg  25 mg Oral BID    gemfibroziL (LOPID) tablet 600 mg  600 mg Oral BID    ezetimibe (ZETIA) tablet 10 mg  10 mg Oral DAILY    cetirizine (ZYRTEC) tablet 10 mg  10 mg Oral DAILY    doxycycline (VIBRAMYCIN) 100 mg in 0.9% sodium chloride (MBP/ADV) 100 mL MBP  100 mg IntraVENous Q12H    cholecalciferol (VITAMIN D3) (1000 Units /25 mcg) tablet 2 Tab  2,000 Units Oral DAILY    cefTRIAXone (ROCEPHIN) 1 g in sterile water (preservative free) 10 mL IV syringe  1 g IntraVENous Q24H         REVIEW OF SYSTEMS   12 point ROS negative except as stated in the HPI.       Physical Exam:   Visit Vitals  BP (!) 147/86 (BP 1 Location: Left arm, BP Patient Position: At rest)   Pulse 90   Temp 98.4 °F (36.9 °C)   Resp 18   Ht 5' 1\" (1.549 m)   Wt 72.6 kg (160 lb)   SpO2 93%   BMI 30.23 kg/m²       General:  Alert, cooperative, no acute distress, appears stated age. Head:  Normocephalic, without obvious abnormality, atraumatic. Eyes:  Conjunctivae/corneas clear. Nose: Nares normal. Septum midline. Mucosa normal.    Throat: Lips, mucosa, and tongue normal.    Neck: Supple, symmetrical, trachea midline, no adenopathy. Lungs:   Crackles bilaterally, L>R. No wheeze. Resp nonlabored at rest with O2 in place. Chest wall:  No tenderness or deformity. Heart:  Regular rate and rhythm, S1, S2 normal, no murmur, click, rub or gallop. Abdomen:   Soft, non-tender. Bowel sounds normal. No masses,  No organomegaly. Extremities: Extremities normal, atraumatic, no cyanosis or edema. Pulses: 2+ and symmetric all extremities. Skin: Skin color, texture, turgor normal. No rashes or lesions   Lymph nodes: Cervical, supraclavicular nodes normal.   Neurologic: Grossly nonfocal       Lab Results   Component Value Date/Time    Sodium 139 12/27/2020 01:29 AM    Potassium 3.9 12/27/2020 01:29 AM    Chloride 109 (H) 12/27/2020 01:29 AM    CO2 23 12/27/2020 01:29 AM    BUN 24 (H) 12/27/2020 01:29 AM    Creatinine 1.16 (H) 12/27/2020 01:29 AM    Glucose 93 12/27/2020 01:29 AM    Calcium 9.3 12/27/2020 01:29 AM    Magnesium 2.0 06/10/2012 04:45 AM    Phosphorus 1.4 (L) 06/11/2012 05:25 AM    Lactic acid 1.0 12/27/2020 11:24 PM       Lab Results   Component Value Date/Time    WBC 12.6 (H) 12/27/2020 01:29 AM    HGB 13.3 12/27/2020 01:29 AM    PLATELET 357 35/64/4667 01:29 AM    MCV 88.1 12/27/2020 01:29 AM       Lab Results   Component Value Date/Time    INR 1.2 (H) 10/11/2011 04:10 PM    aPTT 25.4 10/11/2011 04:10 PM    Alk.  phosphatase 100 12/26/2020 01:00 PM    Protein, total 7.2 12/26/2020 01:00 PM    Albumin 3.4 (L) 12/26/2020 01:00 PM    Globulin 3.8 12/26/2020 01:00 PM       Lab Results   Component Value Date/Time    Iron 121 06/10/2012 04:45 AM    TIBC 320 06/10/2012 04:45 AM    Iron % saturation 38 06/10/2012 04:45 AM Ferritin 443 (H) 12/27/2020 11:24 PM       Lab Results   Component Value Date/Time    C-Reactive protein 6.37 (H) 12/27/2020 01:29 AM    RPR NONREACTIVE 10/12/2011 05:15 AM    TSH 1.860 09/18/2019 09:33 AM        No results found for: PH, PHI, PCO2, PCO2I, PO2, PO2I, HCO3, HCO3I, FIO2, FIO2I    Lab Results   Component Value Date/Time     (H) 06/09/2012 09:10 AM    CK-MB Index 1.8 06/09/2012 09:10 AM    Troponin-I, Qt. <0.05 12/26/2020 01:00 PM        Lab Results   Component Value Date/Time    Culture result: NO GROWTH AFTER 6 HOURS 12/27/2020 11:24 PM    Culture result: (A) 12/26/2020 05:59 PM     LACTOSE FERMENTING GRAM NEGATIVE RODS IDENTIFICATION AND SUSCEPTIBILITY TO FOLLOW    Culture result: NO GROWTH 1 DAY 06/09/2012 09:00 AM       Lab Results   Component Value Date/Time    RPR NONREACTIVE 10/12/2011 05:15 AM    Hep B surface Ag Negative 10/19/2009 12:30 PM       Lab Results   Component Value Date/Time     (H) 06/09/2012 09:10 AM     10/11/2011 04:10 PM       Lab Results   Component Value Date/Time    Color YELLOW/STRAW 12/26/2020 05:59 PM    Appearance CLOUDY (A) 12/26/2020 05:59 PM    pH (UA) 6.5 12/26/2020 05:59 PM    Protein 30 (A) 12/26/2020 05:59 PM    Glucose Negative 12/26/2020 05:59 PM    Ketone Negative 12/26/2020 05:59 PM    Bilirubin Negative 12/26/2020 05:59 PM    Blood LARGE (A) 12/26/2020 05:59 PM    Urobilinogen 0.2 12/26/2020 05:59 PM    Nitrites Negative 12/26/2020 05:59 PM    Leukocyte Esterase LARGE (A) 12/26/2020 05:59 PM    WBC >100 (H) 12/26/2020 05:59 PM    RBC 20-50 12/26/2020 05:59 PM    Bacteria 3+ (A) 12/26/2020 05:59 PM       IMPRESSION  · Acute hypoxemic respiratory failure  · COVID pneumonia  · UTI  · HTN  · Hx of PE    PLAN  · Continue O2 and wean as able to keep sats > 90%; not on home O2 at baseline and currently on 3L  · Check ambulatory oximetry closer to discharge  · CXR tomorrow  · Continue decadron  · Continue empiric doxy and ceftriaxone pending urine cultures  · Given that her first symptoms presented 13-14 days ago, she is outside the window for remdesivir to provide significant benefit.   · Combivent PRN  · Continue zinc and vit C  · BP control per primary team  · PT/OT  · Lovenox/Protonix ppx  ·   Rosa Hoffman NP

## 2020-12-29 ENCOUNTER — APPOINTMENT (OUTPATIENT)
Dept: GENERAL RADIOLOGY | Age: 70
DRG: 177 | End: 2020-12-29
Attending: NURSE PRACTITIONER
Payer: MEDICARE

## 2020-12-29 VITALS
SYSTOLIC BLOOD PRESSURE: 105 MMHG | WEIGHT: 160 LBS | HEIGHT: 61 IN | TEMPERATURE: 98.3 F | HEART RATE: 74 BPM | DIASTOLIC BLOOD PRESSURE: 77 MMHG | RESPIRATION RATE: 18 BRPM | OXYGEN SATURATION: 94 % | BODY MASS INDEX: 30.21 KG/M2

## 2020-12-29 LAB
ANION GAP SERPL CALC-SCNC: 8 MMOL/L (ref 5–15)
APPEARANCE UR: CLEAR
BACTERIA SPEC CULT: ABNORMAL
BACTERIA URNS QL MICRO: NEGATIVE /HPF
BASOPHILS # BLD: 0 K/UL (ref 0–0.1)
BASOPHILS NFR BLD: 0 % (ref 0–1)
BILIRUB UR QL: NEGATIVE
BUN SERPL-MCNC: 25 MG/DL (ref 6–20)
BUN/CREAT SERPL: 25 (ref 12–20)
CALCIUM SERPL-MCNC: 8.8 MG/DL (ref 8.5–10.1)
CC UR VC: ABNORMAL
CHLORIDE SERPL-SCNC: 107 MMOL/L (ref 97–108)
CLUE CELLS VAG QL WET PREP: NORMAL
CO2 SERPL-SCNC: 23 MMOL/L (ref 21–32)
COLOR UR: ABNORMAL
CREAT SERPL-MCNC: 1.01 MG/DL (ref 0.55–1.02)
CRP SERPL-MCNC: 15.7 MG/DL (ref 0–0.6)
D DIMER PPP FEU-MCNC: 0.88 MG/L FEU (ref 0–0.65)
DIFFERENTIAL METHOD BLD: ABNORMAL
EOSINOPHIL # BLD: 0 K/UL (ref 0–0.4)
EOSINOPHIL NFR BLD: 0 % (ref 0–7)
EPITH CASTS URNS QL MICRO: ABNORMAL /LPF
ERYTHROCYTE [DISTWIDTH] IN BLOOD BY AUTOMATED COUNT: 13.2 % (ref 11.5–14.5)
FERRITIN SERPL-MCNC: 609 NG/ML (ref 8–252)
GLUCOSE SERPL-MCNC: 86 MG/DL (ref 65–100)
GLUCOSE UR STRIP.AUTO-MCNC: NEGATIVE MG/DL
HCT VFR BLD AUTO: 36.6 % (ref 35–47)
HGB BLD-MCNC: 12.1 G/DL (ref 11.5–16)
HGB UR QL STRIP: ABNORMAL
HYALINE CASTS URNS QL MICRO: ABNORMAL /LPF (ref 0–5)
IMM GRANULOCYTES # BLD AUTO: 0.1 K/UL (ref 0–0.04)
IMM GRANULOCYTES NFR BLD AUTO: 1 % (ref 0–0.5)
KETONES UR QL STRIP.AUTO: NEGATIVE MG/DL
LDH SERPL L TO P-CCNC: 288 U/L (ref 81–246)
LEUKOCYTE ESTERASE UR QL STRIP.AUTO: ABNORMAL
LYMPHOCYTES # BLD: 1.2 K/UL (ref 0.8–3.5)
LYMPHOCYTES NFR BLD: 10 % (ref 12–49)
MCH RBC QN AUTO: 28.9 PG (ref 26–34)
MCHC RBC AUTO-ENTMCNC: 33.1 G/DL (ref 30–36.5)
MCV RBC AUTO: 87.6 FL (ref 80–99)
MONOCYTES # BLD: 0.5 K/UL (ref 0–1)
MONOCYTES NFR BLD: 5 % (ref 5–13)
NEUTS SEG # BLD: 9.8 K/UL (ref 1.8–8)
NEUTS SEG NFR BLD: 84 % (ref 32–75)
NITRITE UR QL STRIP.AUTO: NEGATIVE
NRBC # BLD: 0 K/UL (ref 0–0.01)
NRBC BLD-RTO: 0 PER 100 WBC
PH UR STRIP: 6 [PH] (ref 5–8)
PLATELET # BLD AUTO: 378 K/UL (ref 150–400)
PMV BLD AUTO: 11.1 FL (ref 8.9–12.9)
POTASSIUM SERPL-SCNC: 3.6 MMOL/L (ref 3.5–5.1)
PROT UR STRIP-MCNC: NEGATIVE MG/DL
RBC # BLD AUTO: 4.18 M/UL (ref 3.8–5.2)
RBC #/AREA URNS HPF: ABNORMAL /HPF (ref 0–5)
SERVICE CMNT-IMP: ABNORMAL
SODIUM SERPL-SCNC: 138 MMOL/L (ref 136–145)
SP GR UR REFRACTOMETRY: 1 (ref 1–1.03)
T VAGINALIS VAG QL WET PREP: NORMAL
UA: UC IF INDICATED,UAUC: ABNORMAL
UR CULT HOLD, URHOLD: NORMAL
UROBILINOGEN UR QL STRIP.AUTO: 0.2 EU/DL (ref 0.2–1)
WBC # BLD AUTO: 11.6 K/UL (ref 3.6–11)
WBC URNS QL MICRO: ABNORMAL /HPF (ref 0–4)

## 2020-12-29 PROCEDURE — 82728 ASSAY OF FERRITIN: CPT

## 2020-12-29 PROCEDURE — 74011250636 HC RX REV CODE- 250/636: Performed by: STUDENT IN AN ORGANIZED HEALTH CARE EDUCATION/TRAINING PROGRAM

## 2020-12-29 PROCEDURE — 80048 BASIC METABOLIC PNL TOTAL CA: CPT

## 2020-12-29 PROCEDURE — 83615 LACTATE (LD) (LDH) ENZYME: CPT

## 2020-12-29 PROCEDURE — 81001 URINALYSIS AUTO W/SCOPE: CPT

## 2020-12-29 PROCEDURE — 74011000258 HC RX REV CODE- 258: Performed by: STUDENT IN AN ORGANIZED HEALTH CARE EDUCATION/TRAINING PROGRAM

## 2020-12-29 PROCEDURE — 71045 X-RAY EXAM CHEST 1 VIEW: CPT

## 2020-12-29 PROCEDURE — 99238 HOSP IP/OBS DSCHRG MGMT 30/<: CPT | Performed by: FAMILY MEDICINE

## 2020-12-29 PROCEDURE — 74011250637 HC RX REV CODE- 250/637: Performed by: STUDENT IN AN ORGANIZED HEALTH CARE EDUCATION/TRAINING PROGRAM

## 2020-12-29 PROCEDURE — 77010033678 HC OXYGEN DAILY

## 2020-12-29 PROCEDURE — 36415 COLL VENOUS BLD VENIPUNCTURE: CPT

## 2020-12-29 PROCEDURE — 86140 C-REACTIVE PROTEIN: CPT

## 2020-12-29 PROCEDURE — 87210 SMEAR WET MOUNT SALINE/INK: CPT

## 2020-12-29 PROCEDURE — 85379 FIBRIN DEGRADATION QUANT: CPT

## 2020-12-29 PROCEDURE — 94618 PULMONARY STRESS TESTING: CPT

## 2020-12-29 PROCEDURE — 87086 URINE CULTURE/COLONY COUNT: CPT

## 2020-12-29 PROCEDURE — 94760 N-INVAS EAR/PLS OXIMETRY 1: CPT

## 2020-12-29 PROCEDURE — C9113 INJ PANTOPRAZOLE SODIUM, VIA: HCPCS | Performed by: STUDENT IN AN ORGANIZED HEALTH CARE EDUCATION/TRAINING PROGRAM

## 2020-12-29 PROCEDURE — 85025 COMPLETE CBC W/AUTO DIFF WBC: CPT

## 2020-12-29 PROCEDURE — 93042 RHYTHM ECG REPORT: CPT | Performed by: FAMILY MEDICINE

## 2020-12-29 RX ORDER — DEXAMETHASONE 6 MG/1
TABLET ORAL
Qty: 7 TAB | Refills: 0 | Status: SHIPPED | OUTPATIENT
Start: 2020-12-30 | End: 2021-01-07

## 2020-12-29 RX ORDER — DOXYCYCLINE 100 MG/1
100 TABLET ORAL 2 TIMES DAILY
Qty: 13 TAB | Refills: 0 | Status: SHIPPED | OUTPATIENT
Start: 2020-12-30 | End: 2021-01-07 | Stop reason: SDUPTHER

## 2020-12-29 RX ORDER — GLYCERIN/MIN OIL/POLYCARBOPHIL
GEL WITH APPLICATOR (GRAM) VAGINAL
Qty: 35 G | Refills: 0 | Status: SHIPPED | OUTPATIENT
Start: 2020-12-29

## 2020-12-29 RX ADMIN — SODIUM CHLORIDE 40 MG: 9 INJECTION INTRAMUSCULAR; INTRAVENOUS; SUBCUTANEOUS at 06:17

## 2020-12-29 RX ADMIN — GEMFIBROZIL 600 MG: 600 TABLET ORAL at 09:21

## 2020-12-29 RX ADMIN — Medication 2 TABLET: at 09:21

## 2020-12-29 RX ADMIN — Medication 500 MG: at 17:42

## 2020-12-29 RX ADMIN — EZETIMIBE 10 MG: 10 TABLET ORAL at 09:21

## 2020-12-29 RX ADMIN — Medication 500 MG: at 09:21

## 2020-12-29 RX ADMIN — ZOLPIDEM TARTRATE 5 MG: 5 TABLET ORAL at 00:12

## 2020-12-29 RX ADMIN — DOXYCYCLINE 100 MG: 100 INJECTION, POWDER, LYOPHILIZED, FOR SOLUTION INTRAVENOUS at 17:41

## 2020-12-29 RX ADMIN — TOPIRAMATE 200 MG: 100 TABLET, FILM COATED ORAL at 17:42

## 2020-12-29 RX ADMIN — GEMFIBROZIL 600 MG: 600 TABLET ORAL at 17:42

## 2020-12-29 RX ADMIN — DEXAMETHASONE SODIUM PHOSPHATE 6 MG: 10 INJECTION, SOLUTION INTRAMUSCULAR; INTRAVENOUS at 06:17

## 2020-12-29 RX ADMIN — Medication 10 ML: at 06:16

## 2020-12-29 RX ADMIN — TOPIRAMATE 200 MG: 100 TABLET, FILM COATED ORAL at 09:21

## 2020-12-29 RX ADMIN — DOXYCYCLINE 100 MG: 100 INJECTION, POWDER, LYOPHILIZED, FOR SOLUTION INTRAVENOUS at 06:16

## 2020-12-29 RX ADMIN — METOPROLOL TARTRATE 25 MG: 25 TABLET, FILM COATED ORAL at 17:42

## 2020-12-29 RX ADMIN — CETIRIZINE HYDROCHLORIDE 10 MG: 10 TABLET, FILM COATED ORAL at 09:21

## 2020-12-29 RX ADMIN — Medication 10 ML: at 13:42

## 2020-12-29 RX ADMIN — ZINC SULFATE 220 MG (50 MG) CAPSULE 1 CAPSULE: CAPSULE at 09:21

## 2020-12-29 RX ADMIN — ENOXAPARIN SODIUM 40 MG: 40 INJECTION SUBCUTANEOUS at 09:22

## 2020-12-29 RX ADMIN — METOPROLOL TARTRATE 25 MG: 25 TABLET, FILM COATED ORAL at 09:21

## 2020-12-29 NOTE — PROGRESS NOTES
Name: Ramu Garcia: Angelia Garvin   : 1950 Admit Date: 2020   Phone: [phone]  Room: [facility]   PCP: Tao Garcia MD  MRN: [id number]   Date: 2020  Code: Full Code          Chart and notes reviewed. Data reviewed. I review the patient's current medications in the medical record at each encounter. I have evaluated and examined the patient. HPI:    Mrs. Cherise Schroeder is a pleasant 65year old female who presented to the [address] [place] [address] ED yesterday with worsening SOB and hypoxia. States she tested positive for COVID on . Reports riding in a car with a friend who had a cough on 12/10 and they did not have masks on the entire time. Her friend tested positive on  and patient began showing symptoms that evening. States she was evaluated at [facility] [employer] in [place] and given an albuterol inhaler and told to closely monitor her O2 sats. Her SOB and hypoxia continued to worsen and she reportedly had sats in the 70s yesterday on her home pulse oximeter. She denies history of lung disease. Does not use home O2 or inhalers at baseline. Reports history of PE in her 45s related to severe illness/inactivity. Chest CTA is personally visualized and report reviewed. There is Patchy diffuse bilateral airspace disease in all lobes. No evidence of PE. Overnight Events:  Afebrile  BP and HR stable  Oxygen sats 96% on 3L NC; dropped down to 2L NC in room  WBC trending down  D-dimer trending down  Creatinine 1.01  BC  NGTD    CXR  no interval change  CXR  increasing atx lower lobes    ROS: Feeling slightly better today. Still SOB with minimal exertion. Only cough with exertion. Denies wheezing, chest pain. Had a bad night as she was hallucinating and paranoid; gone this morning.      Past Medical History:   Diagnosis Date    Aneurysm ([address]     splenic artery aneurysm - no longer needs to be followed up - will never be a problem    Arthritis     back    Chronic pain     back    CKD (chronic kidney disease) stage 2, GFR 60-89 ml/min     History of vascular access device 12/28/2020    4 Fr midilne, 10 cm L basilic, poor access    Hyperlipidemia     Hypertension     Ill-defined condition     walks with a cane    Ill-defined condition     cardiac calcium test - some build up/stress test is \"fine\" per pt    Lumbar stenosis     Migraine     Other ill-defined conditions(799.89)     wheezing with allergies    Other ill-defined conditions(799.89)     GI bleed - stomach - NSAID use    Overactive bladder     PUD (peptic ulcer disease)     Stenosis of lumbosacral spine     [address] [address]     PE       Past Surgical History:   Procedure Laterality Date    COLONOSCOPY N/A 10/1/2018    COLONOSCOPY performed by Fly Reyes MD at [address] [place] [address] ENDOSCOPY    HX HYSTERECTOMY      HX ORTHOPAEDIC  2009    spinal fusion/has not had a lumbar laminectomy    HX ORTHOPAEDIC Bilateral     bunionectomy - bilateral once and unilateral once    HX ORTHOPAEDIC Bilateral     carpal tunnel release    HX TONSILLECTOMY      T & A    HX UROLOGICAL  2012    bladder repair       Family History   Problem Relation Age of Onset    Cancer Father         bladder    Cancer Brother         BCCA    Stroke Mother     Cancer Maternal Uncle         leukemia    Breast Cancer Paternal Aunt     Stroke Maternal Grandmother     Colon Cancer Paternal Grandfather     Cancer Maternal Uncle         throat       Social History     Tobacco Use    Smoking status: Never Smoker    Smokeless tobacco: Never Used   Substance Use Topics    Alcohol use: Yes     Comment: very, very rare       Allergies   Allergen Reactions    Nitrofurantoin Other (comments)     LOW WBC - 2000    Nsaids (Non-Steroidal Anti-Inflammatory Drug) Other (comments)     GI bleed.  Other Plant, Animal, Environmental Other (comments)     Mold, dust, cats, dog allergies. ENVIRONMENTAL ALLERGIES.     Statins-Hmg-Coa Reductase Inhibitors Other (comments)     LFT increases    Tolmetin Unknown (comments)    Toradol [Ketorolac Tromethamine] Rash       Current Facility-Administered Medications   Medication Dose Route Frequency    dexamethasone (PF) (DECADRON) 10 mg/mL injection 6 mg  6 mg IntraVENous Q24H    ipratropium-albuterol (COMBIVENT RESPIMAT) 20 mcg-100 mcg inhalation spray  1 Puff Inhalation Q4H PRN    sodium chloride (NS) flush 5-40 mL  5-40 mL IntraVENous Q8H    sodium chloride (NS) flush 5-40 mL  5-40 mL IntraVENous PRN    acetaminophen (TYLENOL) tablet 650 mg  650 mg Oral Q6H PRN    Or    acetaminophen (TYLENOL) suppository 650 mg  650 mg Rectal Q6H PRN    polyethylene glycol (MIRALAX) packet 17 g  17 g Oral DAILY PRN    promethazine (PHENERGAN) tablet 12.5 mg  12.5 mg Oral Q6H PRN    Or    ondansetron (ZOFRAN) injection 4 mg  4 mg IntraVENous Q6H PRN    enoxaparin (LOVENOX) injection 40 mg  40 mg SubCUTAneous DAILY    pantoprazole (PROTONIX) 40 mg in 0.9% sodium chloride 10 mL injection  40 mg IntraVENous DAILY    ascorbic acid (vitamin C) (VITAMIN C) tablet 500 mg  500 mg Oral BID    zinc sulfate (ZINCATE) 220 (50) mg capsule 1 Cap  1 Cap Oral DAILY    zolpidem (AMBIEN) tablet 5 mg  5 mg Oral QHS PRN    topiramate (TOPAMAX) tablet 200 mg  200 mg Oral BID    metoprolol tartrate (LOPRESSOR) tablet 25 mg  25 mg Oral BID    gemfibroziL (LOPID) tablet 600 mg  600 mg Oral BID    ezetimibe (ZETIA) tablet 10 mg  10 mg Oral DAILY    cetirizine (ZYRTEC) tablet 10 mg  10 mg Oral DAILY    doxycycline (VIBRAMYCIN) 100 mg in 0.9% sodium chloride (MBP/ADV) 100 mL MBP  100 mg IntraVENous Q12H    cholecalciferol (VITAMIN D3) (1000 Units /25 mcg) tablet 2 Tab  2,000 Units Oral DAILY         REVIEW OF SYSTEMS   12 point ROS negative except as stated in the HPI.       Physical Exam:   Visit Vitals  /77 (BP 1 Location: Left arm, BP Patient Position: At rest)   Pulse 81   Temp 98.4 °F (36.9 °C)   Resp 18   Ht 5' 1\" (1.549 m)   Wt 72.6 kg (160 lb)   SpO2 93%   BMI 30.23 kg/m²       General:  Alert, cooperative, no acute distress, appears stated age. Head:  Normocephalic, without obvious abnormality, atraumatic. Eyes:  Conjunctivae/corneas clear. Nose: Nares normal. Septum midline. Mucosa normal.    Throat: Lips, mucosa, and tongue normal.    Neck: Supple, symmetrical, trachea midline, no adenopathy. Lungs:   Crackles bilaterally, L>R. No wheeze. Resp nonlabored at rest with O2 in place. Chest wall:  No tenderness or deformity. Heart:  Regular rate and rhythm, S1, S2 normal, no murmur, click, rub or gallop. Abdomen:   Soft, non-tender. Bowel sounds normal. No masses,  No organomegaly. Extremities: Extremities normal, atraumatic, no cyanosis or edema. Pulses: 2+ and symmetric all extremities. Skin: Skin color, texture, turgor normal. No rashes or lesions   Lymph nodes: Cervical, supraclavicular nodes normal.   Neurologic: Grossly nonfocal       Lab Results   Component Value Date/Time    Sodium 138 12/29/2020 06:22 AM    Potassium 3.6 12/29/2020 06:22 AM    Chloride 107 12/29/2020 06:22 AM    CO2 23 12/29/2020 06:22 AM    BUN 25 (H) 12/29/2020 06:22 AM    Creatinine 1.01 12/29/2020 06:22 AM    Glucose 86 12/29/2020 06:22 AM    Calcium 8.8 12/29/2020 06:22 AM    Magnesium 2.0 06/10/2012 04:45 AM    Phosphorus 1.4 (L) 06/11/2012 05:25 AM    Lactic acid 1.0 12/27/2020 11:24 PM       Lab Results   Component Value Date/Time    WBC 12.6 (H) 12/27/2020 01:29 AM    HGB 13.3 12/27/2020 01:29 AM    PLATELET 599 08/73/9218 01:29 AM    MCV 88.1 12/27/2020 01:29 AM       Lab Results   Component Value Date/Time    INR 1.2 (H) 10/11/2011 04:10 PM    aPTT 25.4 10/11/2011 04:10 PM    Alk.  phosphatase 100 12/26/2020 01:00 PM    Protein, total 7.2 12/26/2020 01:00 PM    Albumin 3.4 (L) 12/26/2020 01:00 PM    Globulin 3.8 12/26/2020 01:00 PM       Lab Results   Component Value Date/Time    Iron 121 06/10/2012 04:45 AM    TIBC 320 06/10/2012 04:45 AM    Iron % saturation 38 06/10/2012 04:45 AM    Ferritin 443 (H) 12/27/2020 11:24 PM       Lab Results   Component Value Date/Time    C-Reactive protein 15.70 (H) 12/29/2020 06:22 AM    RPR NONREACTIVE 10/12/2011 05:15 AM    TSH 1.860 09/18/2019 09:33 AM        No results found for: PH, PHI, PCO2, PCO2I, PO2, PO2I, HCO3, HCO3I, FIO2, FIO2I    Lab Results   Component Value Date/Time     (H) 06/09/2012 09:10 AM    CK-MB Index 1.8 06/09/2012 09:10 AM    Troponin-I, Qt. <0.05 12/26/2020 01:00 PM        Lab Results   Component Value Date/Time    Culture result: NO GROWTH 1 DAY 12/27/2020 11:24 PM    Culture result: KLEBSIELLA PNEUMONIAE (A) 12/26/2020 05:59 PM    Culture result: NO GROWTH 1 DAY 06/09/2012 09:00 AM       Lab Results   Component Value Date/Time    RPR NONREACTIVE 10/12/2011 05:15 AM    Hep B surface Ag Negative 10/19/2009 12:30 PM       Lab Results   Component Value Date/Time     (H) 06/09/2012 09:10 AM     10/11/2011 04:10 PM       Lab Results   Component Value Date/Time    Color YELLOW/STRAW 12/26/2020 05:59 PM    Appearance CLOUDY (A) 12/26/2020 05:59 PM    pH (UA) 6.5 12/26/2020 05:59 PM    Protein 30 (A) 12/26/2020 05:59 PM    Glucose Negative 12/26/2020 05:59 PM    Ketone Negative 12/26/2020 05:59 PM    Bilirubin Negative 12/26/2020 05:59 PM    Blood LARGE (A) 12/26/2020 05:59 PM    Urobilinogen 0.2 12/26/2020 05:59 PM    Nitrites Negative 12/26/2020 05:59 PM    Leukocyte Esterase LARGE (A) 12/26/2020 05:59 PM    WBC >100 (H) 12/26/2020 05:59 PM    RBC 20-50 12/26/2020 05:59 PM    Bacteria 3+ (A) 12/26/2020 05:59 PM       IMPRESSION  · Acute hypoxemic respiratory failure  · COVID pneumonia  · UTI  · HTN  · Hx of PE    PLAN  · Continue O2 and wean as able to keep sats > 90%; not on home O2 at baseline and currently on 2L  · Check ambulatory oximetry closer to discharge  · Continue decadron  · Continue doxy and Rocephin  · Given that her first symptoms presented 13-14 days ago, she is outside the window for remdesivir to provide significant benefit.   · Combivent PRN  · Continue zinc and vit C  · BP control per primary team  · PT/OT  · Encourage IS; OOB as much as able  · Lovenox/Protonix ppx  ·   Blake De La Rosa NP

## 2020-12-29 NOTE — PROGRESS NOTES
Occupational Therapy:  12/29/20    New OT orders received, chart reviewed. Pt evaluated and discharged from OT services on 12/27/2020, noted pt to be MOD I with mobility and ADLs with good safety awareness. Pt with mild CARTY with supplemental O2 and was educated regarding energy conservation in preparation for return home. Discussed with RN and pt this AM. Pt is up ad hollie in room and reporting no difficulty managing her ADLs or mobilizing to bathroom. She feels comfortable with education provide at OT eval. She is still currently requiring supplemental O2 and reports no therapy concerns at this time. Will complete OT orders at this time.     Thank you,  Dolores Lacey, OTR/L

## 2020-12-29 NOTE — PROGRESS NOTES
RT evaluation for home oxygen need was completed. On room air at rest her O2 saturation was 91% with a heart rate of 88 bpm.  While ambulating in her room her lowest O2 saturation was 89%. No supplemental oxygen was indicated,.

## 2020-12-29 NOTE — PROGRESS NOTES
Physical Therapy - Orders received and acknowledged. Chart reviewed. Previous orders for PT evaluation and treatment on 12/26/20 and patient evaluated by PT & OT and discharged from our service. Patient was up ad hollie in room, independent on 4L oxygen. Patient continues to be up ad hollie on droplet plus precautions and is a mckeon score of 1. Recommend consult RT to complete home O2 needs assessment when patient is nearly ready for D/C. Will check in with nurse and patient later today. Thank you. Marianne Watkins PT, DPT    11:37 am Addendum: Spoke with nurse who was with patient bedside. Patient declined need for PT & OT services.  Will complete orders   Marianne Watkins PT, DPT

## 2020-12-29 NOTE — PROGRESS NOTES
12/29/2020  11:26 AM  Update via chart review, pt is continuing to require medical management for COVID PNA, UTI, HTN migraine and audiovisual hallucinations  Transitions Of Care Plan: RUR 16 % Low Risk   LOS  3 Days  1. COVID PNA, pt currently on 3L O2, pulmonary following, pt will need walking oximetry prior to DC  2. UTI, repeat UA  3. DC when stable to home w/ family assistance  4. Pt would benefit  from [facility] if DC on home O2, or [facility] transition appt  5. Outpatient f/u pulmonary, PCP  6.  will transport  7.  CM to follow and assist.  Severiano Monarch, BS

## 2020-12-29 NOTE — PROGRESS NOTES
[address] [facility] [address] [place] [address]   Office [phone]  Fax [phone] [facility] Residency Attending Addendum:  I saw and evaluated the patient on the day of the encounter with Dr. Arun Slater, performing the key elements of the service. I discussed the findings, assessment and plan with the resident and agree with the resident's findings and plan as documented in the resident's note. Continue antibiotics. Patient states she is feeling better. Repeat UA. Try topical vaginal moisturizers for dysuria. Plan to follow-up with PCP discussion about topical estrogen. Potential discharge either today or tomorrow. Robb Hernandez MD, FAAFP, CAQSM, RMSK           Assessment and Plan   Frankie Doty is a 65 y.o. female with a PMHx of HTN, HLD, lumbar spinal stenosis, osteoporosis, H/o GI bleed, migraine headaches who is admitted for COVID PNA. Overnight events: Night team received call about patient having auditory hallucinations (hearing songs). Nurse turned on the TV for white noise and gave ambien. No calls received since. Telemetry reviewed: NSR in the 70s     COVID PNA: Patient tested positive at an urgent care 10 days ago. CTA: multilobar PNA, no PE.  - Consult to pulm  - Continue decadron (12/27)  - Doxy BID (12/26)  - Daily Vitc/zinc  - Daily CRP/DD/Ferritin/LD  - Combivent respimat    UTI: Patient presented with urinary frequency for 1 day. UCx on admission growing klebsiella sensitive to CTX. S/p CTX for 3 days (12/26-12/28). Patient complaining of suprapubic pressure and dysuria this morning.  - Obtain repeat UA  - Self wet prep  - Bladder scan  - Can not start pyridium as GFR too low    Audiovisual hallucinations: Patient has been having audiovisual hallucinations overnight. No psych history. No history of hallucinations. Likely related to UTI.   - Management as above    Migraine: Stable on home topamax, eletriptan PRN  -  Continue topamax  - Rizatriptan PRN    HTN: Continue lopressor 25 every day. POA /82     HLD: lipid panel 10/12 wnl. CT heart in 2016 w/ total calcium score of 222. Previously started on statin but discontinued due to LFT increases. - Hold Repatha, krill oil. - Continue zetia 10, gemfibrozil 600 every day. Allergies: Stable  - Continue zyrtec 10     Osteoporosis: At home on alendronate Q7d and vit D3 2000UI daily. Mild T12 compression deformity on CT, no previous evidence. - Hold alendronate  - Continue D3 2000UI every day     Spinal stenosis: S/p fusion in . Stable. Patient ambulates with cane     Insomnia: Stable on home ambien  - Continue ambien 10     Obesity: The pt's Body mass index is 30.23 kg/m². - Encouraging lifestyle modifications and further follow up outpatient.        FEN/GI - Cardiac diet. No fluids  Activity - Ambulate as tolerated  DVT prophylaxis - Lovenox  GI prophylaxis - Protonix  Fall prophylaxis - Not indicated at this time. Disposition -  Plan to d/c to Home. Consulting PT, OT and CM  Code Status - Full. Discussed with Patient  Next of Danya Willoughby Name and Contact - Srinivas Kilgore,  Spouse - [phone]    Herminia Sandoval MD  Family Medicine Resident         Subjective / Objective     Subjective   Patient seen and evaluated at bedside by Dr Misty Dixon. Overnight concerns include: Auditory, then visual hallucinations last night. Patient endorses dysuria and suprapubic pressure. She does not think the antibiotics helped. Denies chest pain, SOB, CARTY. Temp (24hrs), Av.4 °F (37.4 °C), Min:98.4 °F (36.9 °C), Max:102.5 °F (39.2 °C)     Objective:  Vital signs: (most recent): Blood pressure 138/80, pulse 85, temperature 98.8 °F (37.1 °C), resp. rate 16, height 5' 1\" (1.549 m), weight 160 lb (72.6 kg), SpO2 91 %. Physical exam performed and reported by Dr Misty Dixon, PGY2, due to COVID precautions    General: No acute distress. Alert. Cooperative. Obese  female.  Nasal cannula in   Respiratory: Good air movement throughout. No wheezes, crackles, rales or rhonchi. Cardiovascular: RRR. Normal S1,S2. No m/r/g. Pulses 2+ throughout. GI: + bowel sounds. Nontender. No rebound tenderness or guarding. Nondistended. Extremities: No LE edema. Distal pulses intact. Musculoskeletal: Full ROM in all extremities. Skin: Warm, dry. No rashes. Respiratory: O2 Flow Rate (L/min): 3 l/min O2 Device: Nasal cannula     I/O:  Date 12/27/20 1900 - 12/28/20 0659 12/28/20 0700 - 12/29/20 0659   Shift 2927-8077 24 Hour Total 3848-1439 3598-3814 24 Hour Total   INTAKE   P.O. 240 920 360  360     P. O. 240 920 360  360   I. V.(mL/kg/hr) 10 210        Volume (cefTRIAXone (ROCEPHIN) 1 g in sterile water (preservative free) 10 mL IV syringe) 10 10        Volume (doxycycline (VIBRAMYCIN) 100 mg in 0.9% sodium chloride (MBP/ADV) 100 mL MBP)  200      Shift Total(mL/kg) 250(3.4) 1130(15.6) 360(5)  360(5)   OUTPUT   Urine(mL/kg/hr)          Urine Occurrence(s) 3 x 5 x      Stool          Stool Occurrence(s)  1 x      Shift Total(mL/kg)         1130 360  360   Weight (kg) 72.6 72.6 72.6 72.6 72.6       Inpatient Medications  Current Facility-Administered Medications   Medication Dose Route Frequency    dexamethasone (PF) (DECADRON) 10 mg/mL injection 6 mg  6 mg IntraVENous Q24H    ipratropium-albuterol (COMBIVENT RESPIMAT) 20 mcg-100 mcg inhalation spray  1 Puff Inhalation Q4H PRN    sodium chloride (NS) flush 5-40 mL  5-40 mL IntraVENous Q8H    sodium chloride (NS) flush 5-40 mL  5-40 mL IntraVENous PRN    acetaminophen (TYLENOL) tablet 650 mg  650 mg Oral Q6H PRN    Or    acetaminophen (TYLENOL) suppository 650 mg  650 mg Rectal Q6H PRN    polyethylene glycol (MIRALAX) packet 17 g  17 g Oral DAILY PRN    promethazine (PHENERGAN) tablet 12.5 mg  12.5 mg Oral Q6H PRN    Or    ondansetron (ZOFRAN) injection 4 mg  4 mg IntraVENous Q6H PRN    enoxaparin (LOVENOX) injection 40 mg  40 mg SubCUTAneous DAILY    pantoprazole (PROTONIX) 40 mg in 0.9% sodium chloride 10 mL injection  40 mg IntraVENous DAILY    ascorbic acid (vitamin C) (VITAMIN C) tablet 500 mg  500 mg Oral BID    zinc sulfate (ZINCATE) 220 (50) mg capsule 1 Cap  1 Cap Oral DAILY    zolpidem (AMBIEN) tablet 5 mg  5 mg Oral QHS PRN    topiramate (TOPAMAX) tablet 200 mg  200 mg Oral BID    metoprolol tartrate (LOPRESSOR) tablet 25 mg  25 mg Oral BID    gemfibroziL (LOPID) tablet 600 mg  600 mg Oral BID    ezetimibe (ZETIA) tablet 10 mg  10 mg Oral DAILY    cetirizine (ZYRTEC) tablet 10 mg  10 mg Oral DAILY    doxycycline (VIBRAMYCIN) 100 mg in 0.9% sodium chloride (MBP/ADV) 100 mL MBP  100 mg IntraVENous Q12H    cholecalciferol (VITAMIN D3) (1000 Units /25 mcg) tablet 2 Tab  2,000 Units Oral DAILY    cefTRIAXone (ROCEPHIN) 1 g in sterile water (preservative free) 10 mL IV syringe  1 g IntraVENous Q24H         Allergies  Allergies   Allergen Reactions    Nitrofurantoin Other (comments)     LOW WBC - 2000    Nsaids (Non-Steroidal Anti-Inflammatory Drug) Other (comments)     GI bleed.  Other Plant, Animal, Environmental Other (comments)     Mold, dust, cats, dog allergies. ENVIRONMENTAL ALLERGIES.     Statins-Hmg-Coa Reductase Inhibitors Other (comments)     LFT increases    Tolmetin Unknown (comments)    Toradol [Ketorolac Tromethamine] Rash         CBC:  Recent Labs     12/27/20  0129 12/26/20  1300   WBC 12.6* 14.7*   HGB 13.3 12.4   HCT 40.1 37.2    982       Metabolic Panel:  Recent Labs     12/28/20  1156 12/27/20  0129 12/26/20  1300    139 139   K 3.5 3.9 4.6   * 109* 110*   CO2 24 23 23   BUN 23* 24* 24*   CREA 1.05* 1.16* 1.12*   * 93 122*   CA 9.4 9.3 9.5   ALB  --   --  3.4*   ALT  --   --  23              For Billing    Chief Complaint   Patient presents with   [address] [place] [address] Problems  Date Reviewed: 10/12/2020          Codes Class Noted POA    UTI (urinary tract infection) ICD-10-CM: N39.0  ICD-9-CM: 599.0  12/27/2020 Unknown        Migraine ICD-10-CM: V85.192  ICD-9-CM: 346.90  12/27/2020 Unknown        Osteoporosis ICD-10-CM: M81.0  ICD-9-CM: 733.00  12/27/2020 Unknown        Obesity ICD-10-CM: E66.9  ICD-9-CM: 278.00  12/27/2020 Unknown        * (Principal) Acute hypoxemic respiratory failure due to COVID-19 [facility]) ICD-10-CM: U07.1, J96.01  ICD-9-CM: 518.81, 079.89, 799.02  12/26/2020         HTN (hypertension) ICD-10-CM: I10  ICD-9-CM: 401.9  Unknown Unknown        Hyperlipidemia ICD-10-CM: E78.5  ICD-9-CM: 272.4  Unknown Yes

## 2020-12-29 NOTE — DISCHARGE INSTRUCTIONS
HOME DISCHARGE INSTRUCTIONS    Pratima Bell / [id number] : 1950    Admission date: 2020 Discharge date: 2020 7:44 PM     Please bring this form with you to show your care provider at your follow-up appointment. Primary care provider:  Mahnaz Ramey MD    Discharging provider:  Herminia Sandoval MD  - Family Medicine Resident  Stan Zavala MD - Family Medicine Attending      You have been admitted to the hospital with the following diagnoses:    ACUTE DIAGNOSES:  Acute respiratory failure with hypoxia ([facility] [address] [J96.01]    . . . . . . . . . . . . . . . . . . . . . . . . . . . . . . . . . . . . . . . . . . . . . . . . . . . . . . . . . . . . . . . . . . . . . . . .   You are well enough to be discharged from the hospital. However, because you were inpatient in a hospital, you are at greater risk of having been exposed to the coronavirus. PLEASE stay inside and self-quarantine for 14 days to prevent further spread of the coronavirus. . . . . . . . . . . . . . . . . . . . . . . . . . . . . . . . . . . . . . . . . . . . . . . . . . . . . . . . . . . . . . . . . . . . . . . . .     . . . . . . . . . . . . . . . . . . . . . . . . . . . . . . . . . . . . . . . . . . . . . . . . . . . . . . . . . . . . . . . . . . . . . . . [facility] FOLLOW-UP CARE RECOMMENDATIONS:    Appointments  Follow up with PCP    Follow-up Information     Follow up With Specialties Details Why Contact Info    Mahnaz Ramey MD Family Medicine Call on 2021 Your virtual appointment for hospital follow up is at [place] [phone]  [phone]             Follow-up tests needed:   Urine test in 4 weeks to make sure there is no signs of infection or blood in your urine. Discuss with your PCP vaginal dryness cream continuation     Pending test results:    At the time of your discharge the following test results are still pending: Wet mount result   Please make sure you review these results with your outpatient follow-up provider(s). DIET/what to eat:  Cardiac Diet    ACTIVITY:  Activity as tolerated    Equipment needed:  None    Specific symptoms to watch for: chest pain, shortness of breath, fever, chills, nausea, vomiting, diarrhea, change in mentation, falling, weakness, bleeding. What to do if new or unexpected symptoms occur? If you experience any of the above symptoms (or should other concerns or questions arise after discharge) please call your primary care physician. Return to the emergency room if you cannot get hold of your doctor. · It is very important that you keep your follow-up appointment(s). qq  · Please bring discharge papers, medication list (and/or medication bottles) to your follow-up appointments for review by your outpatient provider(s). · Please check the list of medications and be sure it includes every medication (even non-prescription medications) that your provider wants you to take. · It is important that you take the medication exactly as they are prescribed. · Keep your medication in the bottles provided by the pharmacist and keep a list of the medication names, dosages, and times to be taken in your wallet. · Do not take other medications without consulting your doctor. · If you have any questions about your medications or other instructions, please talk to your nurse or care provider before you leave the hospital.     Information obtained by:     I understand that if any problems occur once I am at home I am to contact my physician. These instructions were explained to me and I had the opportunity to ask questions. I understand and acknowledge receipt of the instructions indicated above.                                                                                                                                                Physician's or R.N.'s Signature                                                                  Date/Time Patient or Representative Signature                                                          Date/Time

## 2020-12-29 NOTE — DISCHARGE SUMMARY
[address] [facility] [address] [place] [address]   Office [phone]  Fax [phone]       Discharge / Transfer / Off-Service Note     Name: Daysi Stevenson MRN: [id number]  Sex: Female   YOB: 1950  Age: 65 y.o. PCP: Alejandra Herrera MD     Date of admission: 12/26/2020  Date of discharge/transfer: 12/29/2020    Attending physician at admission: Dr. Dr Ricardo Handy    Attending physician at discharge/transfer: Dr. Loren Rondon    Resident physician at discharge/transfer: Ronaldo Garcia MD, PGY1     Consultants during hospitalization  Pulmonology     Admission diagnoses   Acute respiratory failure with hypoxia ([facility] [address] [J96.01]    Recommended follow-up after discharge  1. PCP     Things to follow up on with PCP:  UA in 4 weeks for ZOLTAN  Discussion about topical estrogen     Medication Changes:  1. New Medications:  Decadron 6mg every day PO for 7 doses starting on 12/30/2020  Doxycycline 100mg BID for 13 doses starting on 12/30/2020 am  Replens topical gel lightly for vaginal dryness    History Of Present Illness    Per admitting provider: Aden Triana is a 65 y.o. female with PMHx of HTN, HLD, lumbar spinal stenosis, osteoporosis, H/o GI bleed, migraine headaches who presents to the ED complaining of worsening dyspnea and hypoxia after being diagnosed with COVID 10 days ago.     Patients friend tested positive for COVID recently and patient was having mild cough and sneezing, promting her to get tested 10 days ago at urgent care where she was positive. She was given an albuterol inhaler and told to purchase a pulse oximeter. She was given instructions to go the ED if her O2 sat drops below 94%. She had been improving since then, with resolution of her cough and sneezing, when 3 days ago she had a virtual visit with Dr Stacie Flor who prescribed a prednisone taper.  Patient acutely worsened today (12/26) when she began feeling short of breath, spiked a reported fever of 100.4 and had oxygen saturation in the 70s on her home pulse oximeter prompting her to present to the ED. Otherwise she endorses loss of taste and smell for 7 days.     No Hx of COPD, pulm fibrosis, MI, CVA, CHF\"    [address] PNA: Patient tested positive at an urgent care 10 days before admission. Pulmonology was consulted. Patient was treated with dexamethasone, doxycycline, combivent respimat, vitamin C,zinc and supplemental oxygen. Patient clinically improved and oxygen was weaned down. Patient was back to baseline and passed home oxygen assessment on room air on day of discharge. - Continue decadron 60mg daily for a 10 day total course  - Continue doxycycline 100mg BID for a 10 day total course    UTI: Present on admission. UCx grew klebsiella sensitive to CTX. Patient was treated with a 3 day course of IV ceftriaxone. Patient complaining of suprapubic pressure and dysuria for 1 day but repeat UA was positive only for trace blood and LE. Patients symptoms likely 2/2 vaginal atrophy.  - Patient discharged on topical replens gel  - Self wet prep collected. Please follow up outpatient  - UA in 4 weeks for ZOLTAN     Audiotory hallucinations: Resolved. Patient had one episode of hallucination on 12/28 at night when she could hear music. No psych history or other history of hallucinations. The episode did not recur.  - Please follow up outpatient     Patient all other problems remained stable during hospital stay and no changes were made to home regimens.       Physical exam at discharge:    Vitals Visit Vitals  /77 (BP 1 Location: Right arm, BP Patient Position: At rest)   Pulse 74   Temp 98.3 °F (36.8 °C)   Resp 18   Ht 5' 1\" (1.549 m)   Wt 160 lb (72.6 kg)   SpO2 94%   BMI 30.23 kg/m²         General: No acute distress. Alert. Cooperative. Obese  female. Nasal cannula in   Respiratory: Good air movement throughout. No wheezes, crackles, rales or rhonchi. Cardiovascular: RRR. Normal S1,S2. No m/r/g. Pulses 2+ throughout. GI: + bowel sounds. Nontender. No rebound tenderness or guarding. Nondistended. Extremities: No LE edema. Distal pulses intact. Musculoskeletal: Full ROM in all extremities. Skin: Warm, dry. No rashes.           Condition at discharge: Stable. Labs  Recent Labs     12/29/20  1322 12/27/20  0129   WBC 11.6* 12.6*   HGB 12.1 13.3   HCT 36.6 40.1    347     Recent Labs     12/29/20  0622 12/28/20  1156 12/27/20  0129    139 139   K 3.6 3.5 3.9    109* 109*   CO2 23 24 23   BUN 25* 23* 24*   CREA 1.01 1.05* 1.16*   GLU 86 132* 93   CA 8.8 9.4 9.3     No results for input(s): ALT, AP, TBIL, TBILI, TP, ALB, GLOB, GGT, AML, LPSE in the last 72 hours. No lab exists for component: SGOT, GPT, AMYP, HLPSE  Recent Labs     12/27/20  2324 12/27/20  0129   FERR 443* 398*     Cultures  · BCx NG1D  · UCx klebsiella sensitive to CTX    Procedures / Diagnostic Studies  · None    Imaging  Results from [place] encounter on 12/26/20   XR US GUIDED VASCULAR ACCESS    Narrative INDICATION:   NO VENOUS ACCESS      EXAMINATION:  US GUIDE VAS ACCESS     FINDINGS: Ultrasound was provided for Midline placement. Impression IMPRESSION:    Ultrasound guidance for Midline  placement. [facility]          No results found for this or any previous visit. Results from [place] encounter on 12/26/20   CTA CHEST W OR W WO CONT    Narrative EXAM: CTA CHEST W OR W WO CONT    INDICATION: Hypoxia, dyspnea,  + Covid 10 days ago. History of PE    COMPARISON: None. CONTRAST: 100 mL of Isovue-370. TECHNIQUE:   Precontrast  images were obtained to localize the volume for acquisition. Multislice helical CT arteriography was performed from the diaphragm to the  thoracic inlet during uneventful rapid bolus intravenous contrast  administration. Lung and soft tissue windows were generated.   Coronal and  sagittal images were generated and 3D post processing consisting of coronal  maximum intensity images was performed. CT dose reduction was achieved through  use of a standardized protocol tailored for this examination and automatic  exposure control for dose modulation. FINDINGS:  Patchy diffuse bilateral airspace disease in all lobes. Cardiomegaly. The pulmonary arteries are well enhanced and no pulmonary emboli are identified. There is no mediastinal or hilar adenopathy or mass. The aorta enhances normally  without evidence of aneurysm or dissection. The visualized portions of the upper abdominal organs are remarkable for fatty  infiltration of the pancreas. Mild superior endplate C25 compression deformity. Impression IMPRESSION:     Multilobar pneumonia   No evidence for pulmonary embolism. Incidental cardiomegaly  Mild superior endplate R38 compression deformity, age indeterminate            No procedure found.     Chronic diagnoses   Problem List as of 12/29/2020 Date Reviewed: 10/12/2020          Codes Class Noted - Resolved    UTI (urinary tract infection) ICD-10-CM: N39.0  ICD-9-CM: 599.0  12/27/2020 - Present        Migraine ICD-10-CM: N01.275  ICD-9-CM: 346.90  12/27/2020 - Present        Osteoporosis ICD-10-CM: M81.0  ICD-9-CM: 733.00  12/27/2020 - Present        Obesity ICD-10-CM: E66.9  ICD-9-CM: 278.00  12/27/2020 - Present        * (Principal) Acute hypoxemic respiratory failure due to COVID-19 [facility]) ICD-10-CM: U07.1, J96.01  ICD-9-CM: 518.81, 079.89, 799.02  12/26/2020 - Present        Insomnia ICD-10-CM: G47.00  ICD-9-CM: 780.52  12/6/2018 - Present        Hyponatremia ICD-10-CM: E87.1  ICD-9-CM: 276.1  6/9/2012 - Present        Hyperkalemia ICD-10-CM: E87.5  ICD-9-CM: 276.7  6/9/2012 - Present        Acidosis ICD-10-CM: E87.2  ICD-9-CM: 276.2  6/9/2012 - Present        Metabolic encephalopathy IIH-34-TI: G93.41  ICD-9-CM: 348.31  6/9/2012 - Present        HTN (hypertension) ICD-10-CM: I10  ICD-9-CM: 401.9  Unknown - Present        Hyperlipidemia ICD-10-CM: E78.5  ICD-9-CM: 272.4  Unknown - Present        Chronic pain ICD-10-CM: G89.29  ICD-9-CM: 338.29  Unknown - Present        Leukocytosis, unspecified ICD-10-CM: D72.829  ICD-9-CM: 288.60  10/11/2011 - Present        Anemia, unspecified ICD-10-CM: D64.9  ICD-9-CM: 285.9  10/11/2011 - Present        Dehydration ICD-10-CM: E86.0  ICD-9-CM: 276.51  10/11/2011 - Present        Renal failure, acute ([facility] [address] ICD-10-CM: N17.9  ICD-9-CM: 584.9  10/11/2011 - Present        Altered mental status ICD-10-CM: R41.82  ICD-9-CM: 780.97  10/11/2011 - Present        Stenosis of lumbosacral spine ICD-10-CM: M48.07  ICD-9-CM: 724.02  Unknown - Present              Discharge/Transfer Medications  Current Discharge Medication List      START taking these medications    Details   doxycycline (ADOXA) 100 mg tablet Take 1 Tab by mouth two (2) times a day for 13 doses. First dose 12/30/20 in the morning, last dose 1/5/2021 in the morning. Qty: 13 Tab, Refills: 0      dexAMETHasone (DECADRON) 6 mg tablet Take once daily starting 12/30/20 for total of 7 doses. Qty: 7 Tab, Refills: 0      glycerin-min oil-polycarbophil (Replens) gel Use nightly. Topical use only. Put thin layer in vulvar area. Qty: 35 g, Refills: 0         CONTINUE these medications which have NOT CHANGED    Details   zolpidem (AMBIEN) 5 mg tablet Take 5 mg by mouth nightly as needed for Sleep. alendronate (FOSAMAX) 70 mg tablet Take 1 Tab by mouth every seven (7) days. Qty: 13 Tab, Refills: 3    Associated Diagnoses: Osteoporosis, unspecified osteoporosis type, unspecified pathological fracture presence      ezetimibe (ZETIA) 10 mg tablet TAKE 1 TABLET BY MOUTH DAILY  Qty: 90 Tab, Refills: 1      gemfibroziL (LOPID) 600 mg tablet TAKE 1 TABLET BY MOUTH TWICE DAILY  Qty: 180 Tab, Refills: 1      metoprolol tartrate (LOPRESSOR) 25 mg tablet TAKE 1 TABLET BY MOUTH TWICE DAILY  Qty: 180 Tab, Refills: 1      REPATHA SURECLICK pen injection 396 mg Once every 2 weeks.   Refills: 6      eletriptan (RELPAX) 40 mg tablet Take 40 mg by mouth once as needed. may repeat in 2 hours if necessary      KRILL OIL PO Take 500 mg by mouth daily. multivitamin (MULTIPLE VITAMINS PO) Take 1 Tab by mouth daily. Lactobacillus acidophilus (ACIDOPHILUS PO) Take 1 Cap by mouth daily. topiramate (TOPAMAX) 200 mg tablet Take 200 mg by mouth two (2) times a day. cholecalciferol, vitamin D3, 2,000 unit tab Take 2,000 Units by mouth daily. turmeric (CURCUMIN) Take 500 mg by mouth daily. acetaminophen (TYLENOL EXTRA STRENGTH PO) Take 500 mg by mouth as needed. Takes 2 po as needed for leg or back pain. cetirizine (ZYRTEC) 10 mg tablet Take 10 mg by mouth daily. STOP taking these medications       predniSONE (DELTASONE) 20 mg tablet Comments:   Reason for Stopping:                Diet:  Cardiac diet.     Activity:  As tolerated    Disposition: Home or Self Care    Discharge instructions to patient/family  Please seek medical attention for any new or worsening symptoms particularly fever, chest pain, shortness of breath, abdominal pain, nausea, vomiting    Follow up plans/appointments  Follow-up Information     Follow up With Specialties Details Why Contact Info    Jalyn Rodriguez MD Family Medicine Call on 1/4/2021 Your virtual appointment for hospital follow up is at [address] [facility] [address]             Kiet Kirkland MD  Family Medicine Resident       For Billing    Chief Complaint   Patient presents with    Positive For [facility] Problems  Date Reviewed: 10/12/2020          Codes Class Noted POA    UTI (urinary tract infection) ICD-10-CM: N39.0  ICD-9-CM: 599.0  12/27/2020 Unknown        Migraine ICD-10-CM: I50.018  ICD-9-CM: 346.90  12/27/2020 Unknown        Osteoporosis ICD-10-CM: M81.0  ICD-9-CM: 733.00  12/27/2020 Unknown        Obesity ICD-10-CM: V88.9  ICD-9-CM: 278.00  12/27/2020 Unknown        * (Principal) Acute hypoxemic respiratory failure due to COVID-19 [facility]) ICD-10-CM: U07.1, J96.01  ICD-9-CM: 518.81, 079.89, 799.02  12/26/2020         HTN (hypertension) ICD-10-CM: I10  ICD-9-CM: 401.9  Unknown Unknown        Hyperlipidemia ICD-10-CM: E78.5  ICD-9-CM: 272.4  Unknown Yes               [facility] Residency Attending Addendum:  I saw and evaluated the patient on the day of the encounter with Dr. Cm Livingston, performing the key elements of the service. I discussed the findings, assessment and plan with the resident and agree with the resident's findings and plan as documented in the resident's note.       Lety Rivas MD, FAAFP, CAQSM, RMSK

## 2020-12-29 NOTE — PROGRESS NOTES
Called family practice to notify of patient refusal for lab draws. PICC line not returning blood any more after several attempts. Attempted to draw once.

## 2020-12-29 NOTE — PROGRESS NOTES
Patient reports that she feels like she is \"hallucinating\", patient states she's hearing gospel songs in his ears that she does not listen to. There is no music playing in the room. Nurse asked patient what kind of music does she like to listen to, patient states \"soul music\". Nurse has soul music play in room. Patient agrees to have soul music play in room. 0023 Patient reports she has not heard the other music since the soul music started  Playing in her room. Family Practice notified of patient's concern, and the fact that the hallucination has resolved. 0600 Patient reports that she has not heard that \"strange music\" since yesterday evening. Patient also reports that she's had the best night, has been able to sleep. Patient has no concerns at the moment.

## 2020-12-29 NOTE — PROGRESS NOTES
Spoke to patient regarding UA findings, that showed trace blood and trace LE that is definitely better than initial UA. Patient reports that the burning that she had this morning is completely gone, she has urinated multiple times today and did not have any discomfort, the suprapubic pain is gone as well. She was not able to get a wet-prep yet. Patient is requesting to be discharged tonight, she feels well and passed O2 assessment on room air.      Martinez Crespo MD

## 2020-12-30 ENCOUNTER — PATIENT OUTREACH (OUTPATIENT)
Dept: CASE MANAGEMENT | Age: 70
End: 2020-12-30

## 2020-12-30 LAB
BACTERIA SPEC CULT: NORMAL
SERVICE CMNT-IMP: NORMAL

## 2020-12-30 NOTE — PROGRESS NOTES
Patient contacted regarding USLLA-81 diagnosis\". Discussed COVID-19 related testing which was available at this time. Test results were positive. Patient informed of results, if available? yes     Care Transition Nurse/ Ambulatory Care Manager contacted the patient by telephone to perform post discharge assessment. Call within 2 business days of discharge: Yes Verified name and  with patient as identifiers. Provided introduction to self, and explanation of the CTN/ACM role, and reason for call due to risk factors for infection and/or exposure to COVID-19. Symptoms reviewed with patient who verbalized the following symptoms: shortness of breath, no new symptoms and no worsening symptoms      Due to no new or worsening symptoms encounter was not routed to provider for escalation. Discussed follow-up appointments. If no appointment was previously scheduled, appointment scheduling offered:  yes   [facility] follow up appointment(s):   Future Appointments   Date Time Provider Jhon Orona   2021 10:00 AM Willard Peralta, JOSLYN CFM BS [facility]     Non-BS follow up appointment(s): None at this time. Advance Care Planning:   Does patient have an Advance Directive:  not on file. Patient has following risk factors of: pneumonia. CTN/ACM reviewed discharge instructions, medical action plan and red flags such as increased shortness of breath, increasing fever and signs of decompensation with patient who verbalized understanding. Discussed exposure protocols and quarantine with CDC Guidelines What to do if you are sick with coronavirus disease .  Patient was given an opportunity for questions and concerns. The patient agrees to contact the [facility] exposure line [phone], [facility] department [facility] [address]  ([phone] and PCP office for questions related to their healthcare. CTN/ACM provided contact information for future needs.     Reviewed and educated patient on any new and changed medications related to discharge diagnosis     Patient/family/caregiver given information for [employer] and agrees to enroll no. Patient declined. Plan for follow-up call in 5-7 days based on severity of symptoms and risk factors. Patient reports SOB with ambulation but has not worsened since discharge. Patient has pulse oximeter but has not used it. Encouraged to check pulse ox readings. If readings decrease below 92% call PCP. If SOB increases and readings below 90% call 911. Patient understands and agrees. Encouraged to use Incentive Spirometer and increase fluid intake while recovering. She will follow up with PCP if she feels worse and attend appointment on 1/4/2021.

## 2020-12-31 ENCOUNTER — APPOINTMENT (OUTPATIENT)
Dept: GENERAL RADIOLOGY | Age: 70
End: 2020-12-31
Attending: EMERGENCY MEDICINE
Payer: MEDICARE

## 2020-12-31 ENCOUNTER — HOSPITAL ENCOUNTER (OUTPATIENT)
Age: 70
Setting detail: OBSERVATION
Discharge: HOME | End: 2021-01-01
Attending: EMERGENCY MEDICINE | Admitting: FAMILY MEDICINE
Payer: MEDICARE

## 2020-12-31 ENCOUNTER — PATIENT OUTREACH (OUTPATIENT)
Dept: CASE MANAGEMENT | Age: 70
End: 2020-12-31

## 2020-12-31 DIAGNOSIS — E78.5 HYPERLIPIDEMIA, UNSPECIFIED HYPERLIPIDEMIA TYPE: ICD-10-CM

## 2020-12-31 DIAGNOSIS — R09.02 HYPOXIA: Primary | ICD-10-CM

## 2020-12-31 DIAGNOSIS — U07.1 COVID-19: ICD-10-CM

## 2020-12-31 DIAGNOSIS — U07.1 ACUTE HYPOXEMIC RESPIRATORY FAILURE DUE TO COVID-19 (HCC): ICD-10-CM

## 2020-12-31 DIAGNOSIS — I10 ESSENTIAL HYPERTENSION: ICD-10-CM

## 2020-12-31 DIAGNOSIS — J96.01 ACUTE HYPOXEMIC RESPIRATORY FAILURE DUE TO COVID-19 (HCC): ICD-10-CM

## 2020-12-31 DIAGNOSIS — J96.01 ACUTE RESPIRATORY FAILURE WITH HYPOXIA (HCC): ICD-10-CM

## 2020-12-31 LAB
25(OH)D3 SERPL-MCNC: 39.3 NG/ML (ref 30–100)
ALBUMIN SERPL-MCNC: 2.9 G/DL (ref 3.5–5)
ALBUMIN SERPL-MCNC: 2.9 G/DL (ref 3.5–5)
ALBUMIN/GLOB SERPL: 0.6 {RATIO} (ref 1.1–2.2)
ALBUMIN/GLOB SERPL: 0.7 {RATIO} (ref 1.1–2.2)
ALP SERPL-CCNC: 92 U/L (ref 45–117)
ALP SERPL-CCNC: 94 U/L (ref 45–117)
ALT SERPL-CCNC: 21 U/L (ref 12–78)
ALT SERPL-CCNC: 21 U/L (ref 12–78)
ANION GAP SERPL CALC-SCNC: 6 MMOL/L (ref 5–15)
ANION GAP SERPL CALC-SCNC: 8 MMOL/L (ref 5–15)
APTT PPP: 27.1 SEC (ref 22.1–31)
AST SERPL-CCNC: 27 U/L (ref 15–37)
AST SERPL-CCNC: 33 U/L (ref 15–37)
BASOPHILS # BLD: 0 K/UL (ref 0–0.1)
BASOPHILS NFR BLD: 0 % (ref 0–1)
BILIRUB SERPL-MCNC: 0.3 MG/DL (ref 0.2–1)
BILIRUB SERPL-MCNC: 0.5 MG/DL (ref 0.2–1)
BNP SERPL-MCNC: 321 PG/ML
BUN SERPL-MCNC: 20 MG/DL (ref 6–20)
BUN SERPL-MCNC: 20 MG/DL (ref 6–20)
BUN/CREAT SERPL: 22 (ref 12–20)
BUN/CREAT SERPL: 23 (ref 12–20)
CALCIUM SERPL-MCNC: 9.3 MG/DL (ref 8.5–10.1)
CALCIUM SERPL-MCNC: 9.5 MG/DL (ref 8.5–10.1)
CHLORIDE SERPL-SCNC: 109 MMOL/L (ref 97–108)
CHLORIDE SERPL-SCNC: 110 MMOL/L (ref 97–108)
CO2 SERPL-SCNC: 21 MMOL/L (ref 21–32)
CO2 SERPL-SCNC: 24 MMOL/L (ref 21–32)
COMMENT, HOLDF: NORMAL
CREAT SERPL-MCNC: 0.86 MG/DL (ref 0.55–1.02)
CREAT SERPL-MCNC: 0.93 MG/DL (ref 0.55–1.02)
CRP SERPL-MCNC: 7.12 MG/DL (ref 0–0.6)
D DIMER PPP FEU-MCNC: 0.84 MG/L FEU (ref 0–0.65)
DIFFERENTIAL METHOD BLD: ABNORMAL
EOSINOPHIL # BLD: 0.2 K/UL (ref 0–0.4)
EOSINOPHIL NFR BLD: 1 % (ref 0–7)
ERYTHROCYTE [DISTWIDTH] IN BLOOD BY AUTOMATED COUNT: 13.5 % (ref 11.5–14.5)
FERRITIN SERPL-MCNC: 621 NG/ML (ref 8–252)
FIBRINOGEN PPP-MCNC: 790 MG/DL (ref 200–475)
GLOBULIN SER CALC-MCNC: 4.1 G/DL (ref 2–4)
GLOBULIN SER CALC-MCNC: 4.9 G/DL (ref 2–4)
GLUCOSE SERPL-MCNC: 129 MG/DL (ref 65–100)
GLUCOSE SERPL-MCNC: 131 MG/DL (ref 65–100)
HCT VFR BLD AUTO: 37 % (ref 35–47)
HGB BLD-MCNC: 12 G/DL (ref 11.5–16)
IMM GRANULOCYTES # BLD AUTO: 0.2 K/UL (ref 0–0.04)
IMM GRANULOCYTES NFR BLD AUTO: 2 % (ref 0–0.5)
INR PPP: 1.2 (ref 0.9–1.1)
LACTATE SERPL-SCNC: 1.4 MMOL/L (ref 0.4–2)
LDH SERPL L TO P-CCNC: 382 U/L (ref 81–246)
LYMPHOCYTES # BLD: 1.3 K/UL (ref 0.8–3.5)
LYMPHOCYTES NFR BLD: 11 % (ref 12–49)
MAGNESIUM SERPL-MCNC: 1.7 MG/DL (ref 1.6–2.4)
MCH RBC QN AUTO: 28.8 PG (ref 26–34)
MCHC RBC AUTO-ENTMCNC: 32.4 G/DL (ref 30–36.5)
MCV RBC AUTO: 88.7 FL (ref 80–99)
MONOCYTES # BLD: 0.5 K/UL (ref 0–1)
MONOCYTES NFR BLD: 4 % (ref 5–13)
NEUTS SEG # BLD: 9.9 K/UL (ref 1.8–8)
NEUTS SEG NFR BLD: 82 % (ref 32–75)
NRBC # BLD: 0 K/UL (ref 0–0.01)
NRBC BLD-RTO: 0 PER 100 WBC
PLATELET # BLD AUTO: 446 K/UL (ref 150–400)
PMV BLD AUTO: 11.1 FL (ref 8.9–12.9)
POTASSIUM SERPL-SCNC: 3.8 MMOL/L (ref 3.5–5.1)
POTASSIUM SERPL-SCNC: 4.1 MMOL/L (ref 3.5–5.1)
PROCALCITONIN SERPL-MCNC: 0.05 NG/ML
PROT SERPL-MCNC: 7 G/DL (ref 6.4–8.2)
PROT SERPL-MCNC: 7.8 G/DL (ref 6.4–8.2)
PROTHROMBIN TIME: 11.9 SEC (ref 9–11.1)
RBC # BLD AUTO: 4.17 M/UL (ref 3.8–5.2)
SAMPLES BEING HELD,HOLD: NORMAL
SODIUM SERPL-SCNC: 139 MMOL/L (ref 136–145)
SODIUM SERPL-SCNC: 139 MMOL/L (ref 136–145)
THERAPEUTIC RANGE,PTTT: NORMAL SECS (ref 58–77)
TROPONIN I SERPL-MCNC: <0.05 NG/ML
WBC # BLD AUTO: 12.2 K/UL (ref 3.6–11)

## 2020-12-31 PROCEDURE — 85610 PROTHROMBIN TIME: CPT

## 2020-12-31 PROCEDURE — 99218 HC RM OBSERVATION: CPT

## 2020-12-31 PROCEDURE — 80053 COMPREHEN METABOLIC PANEL: CPT

## 2020-12-31 PROCEDURE — 86140 C-REACTIVE PROTEIN: CPT

## 2020-12-31 PROCEDURE — 74011250636 HC RX REV CODE- 250/636: Performed by: STUDENT IN AN ORGANIZED HEALTH CARE EDUCATION/TRAINING PROGRAM

## 2020-12-31 PROCEDURE — 71045 X-RAY EXAM CHEST 1 VIEW: CPT

## 2020-12-31 PROCEDURE — 83735 ASSAY OF MAGNESIUM: CPT

## 2020-12-31 PROCEDURE — 83880 ASSAY OF NATRIURETIC PEPTIDE: CPT

## 2020-12-31 PROCEDURE — 84484 ASSAY OF TROPONIN QUANT: CPT

## 2020-12-31 PROCEDURE — 85384 FIBRINOGEN ACTIVITY: CPT

## 2020-12-31 PROCEDURE — 83615 LACTATE (LD) (LDH) ENZYME: CPT

## 2020-12-31 PROCEDURE — 83605 ASSAY OF LACTIC ACID: CPT

## 2020-12-31 PROCEDURE — 82728 ASSAY OF FERRITIN: CPT

## 2020-12-31 PROCEDURE — 82306 VITAMIN D 25 HYDROXY: CPT

## 2020-12-31 PROCEDURE — 85730 THROMBOPLASTIN TIME PARTIAL: CPT

## 2020-12-31 PROCEDURE — 85379 FIBRIN DEGRADATION QUANT: CPT

## 2020-12-31 PROCEDURE — 74011250637 HC RX REV CODE- 250/637: Performed by: STUDENT IN AN ORGANIZED HEALTH CARE EDUCATION/TRAINING PROGRAM

## 2020-12-31 PROCEDURE — 99285 EMERGENCY DEPT VISIT HI MDM: CPT

## 2020-12-31 PROCEDURE — 84145 PROCALCITONIN (PCT): CPT

## 2020-12-31 PROCEDURE — 93005 ELECTROCARDIOGRAM TRACING: CPT

## 2020-12-31 PROCEDURE — 85025 COMPLETE CBC W/AUTO DIFF WBC: CPT

## 2020-12-31 PROCEDURE — 94618 PULMONARY STRESS TESTING: CPT

## 2020-12-31 PROCEDURE — 96372 THER/PROPH/DIAG INJ SC/IM: CPT

## 2020-12-31 RX ORDER — MELATONIN
2000 DAILY
Status: DISCONTINUED | OUTPATIENT
Start: 2021-01-01 | End: 2021-01-01 | Stop reason: HOSPADM

## 2020-12-31 RX ORDER — CETIRIZINE HCL 10 MG
10 TABLET ORAL DAILY
Status: DISCONTINUED | OUTPATIENT
Start: 2021-01-01 | End: 2021-01-01 | Stop reason: HOSPADM

## 2020-12-31 RX ORDER — PANTOPRAZOLE SODIUM 40 MG/1
40 TABLET, DELAYED RELEASE ORAL
Status: DISCONTINUED | OUTPATIENT
Start: 2021-01-01 | End: 2021-01-01 | Stop reason: HOSPADM

## 2020-12-31 RX ORDER — DEXAMETHASONE 6 MG/1
6 TABLET ORAL DAILY
Status: DISCONTINUED | OUTPATIENT
Start: 2021-01-01 | End: 2021-01-01 | Stop reason: HOSPADM

## 2020-12-31 RX ORDER — ACETAMINOPHEN 325 MG/1
650 TABLET ORAL
Status: DISCONTINUED | OUTPATIENT
Start: 2020-12-31 | End: 2021-01-01 | Stop reason: HOSPADM

## 2020-12-31 RX ORDER — POLYETHYLENE GLYCOL 3350 17 G/17G
17 POWDER, FOR SOLUTION ORAL DAILY PRN
Status: DISCONTINUED | OUTPATIENT
Start: 2020-12-31 | End: 2021-01-01 | Stop reason: HOSPADM

## 2020-12-31 RX ORDER — BENZONATATE 100 MG/1
100 CAPSULE ORAL
Status: DISCONTINUED | OUTPATIENT
Start: 2020-12-31 | End: 2021-01-01 | Stop reason: HOSPADM

## 2020-12-31 RX ORDER — DOXYCYCLINE HYCLATE 100 MG
100 TABLET ORAL EVERY 12 HOURS
Status: DISCONTINUED | OUTPATIENT
Start: 2020-12-31 | End: 2021-01-01 | Stop reason: HOSPADM

## 2020-12-31 RX ORDER — ZOLPIDEM TARTRATE 5 MG/1
5 TABLET ORAL
Status: DISCONTINUED | OUTPATIENT
Start: 2020-12-31 | End: 2021-01-01 | Stop reason: HOSPADM

## 2020-12-31 RX ORDER — ACETAMINOPHEN 650 MG/1
650 SUPPOSITORY RECTAL
Status: DISCONTINUED | OUTPATIENT
Start: 2020-12-31 | End: 2021-01-01 | Stop reason: HOSPADM

## 2020-12-31 RX ORDER — SODIUM CHLORIDE 0.9 % (FLUSH) 0.9 %
5-40 SYRINGE (ML) INJECTION AS NEEDED
Status: DISCONTINUED | OUTPATIENT
Start: 2020-12-31 | End: 2021-01-01 | Stop reason: HOSPADM

## 2020-12-31 RX ORDER — ENOXAPARIN SODIUM 100 MG/ML
40 INJECTION SUBCUTANEOUS DAILY
Status: DISCONTINUED | OUTPATIENT
Start: 2020-12-31 | End: 2021-01-01 | Stop reason: HOSPADM

## 2020-12-31 RX ORDER — GUAIFENESIN 600 MG/1
600 TABLET, EXTENDED RELEASE ORAL EVERY 12 HOURS
Status: DISCONTINUED | OUTPATIENT
Start: 2020-12-31 | End: 2021-01-01 | Stop reason: HOSPADM

## 2020-12-31 RX ORDER — METOPROLOL TARTRATE 25 MG/1
25 TABLET, FILM COATED ORAL 2 TIMES DAILY
Status: DISCONTINUED | OUTPATIENT
Start: 2020-12-31 | End: 2021-01-01 | Stop reason: HOSPADM

## 2020-12-31 RX ORDER — EZETIMIBE 10 MG/1
10 TABLET ORAL DAILY
Status: DISCONTINUED | OUTPATIENT
Start: 2021-01-01 | End: 2021-01-01 | Stop reason: HOSPADM

## 2020-12-31 RX ORDER — GEMFIBROZIL 600 MG/1
600 TABLET, FILM COATED ORAL
Status: DISCONTINUED | OUTPATIENT
Start: 2020-12-31 | End: 2021-01-01 | Stop reason: HOSPADM

## 2020-12-31 RX ORDER — ASCORBIC ACID 500 MG
500 TABLET ORAL 2 TIMES DAILY
Status: DISCONTINUED | OUTPATIENT
Start: 2020-12-31 | End: 2021-01-01 | Stop reason: HOSPADM

## 2020-12-31 RX ORDER — TOPIRAMATE 100 MG/1
200 TABLET, FILM COATED ORAL 2 TIMES DAILY
Status: DISCONTINUED | OUTPATIENT
Start: 2020-12-31 | End: 2021-01-01 | Stop reason: HOSPADM

## 2020-12-31 RX ORDER — ZINC SULFATE 50(220)MG
1 CAPSULE ORAL DAILY
Status: DISCONTINUED | OUTPATIENT
Start: 2021-01-01 | End: 2021-01-01 | Stop reason: HOSPADM

## 2020-12-31 RX ORDER — SODIUM CHLORIDE 0.9 % (FLUSH) 0.9 %
5-40 SYRINGE (ML) INJECTION EVERY 8 HOURS
Status: DISCONTINUED | OUTPATIENT
Start: 2020-12-31 | End: 2021-01-01 | Stop reason: HOSPADM

## 2020-12-31 RX ADMIN — OXYCODONE HYDROCHLORIDE AND ACETAMINOPHEN 500 MG: 500 TABLET ORAL at 18:53

## 2020-12-31 RX ADMIN — GUAIFENESIN 600 MG: 600 TABLET ORAL at 22:07

## 2020-12-31 RX ADMIN — ENOXAPARIN SODIUM 40 MG: 40 INJECTION SUBCUTANEOUS at 22:09

## 2020-12-31 RX ADMIN — GEMFIBROZIL 600 MG: 600 TABLET ORAL at 18:52

## 2020-12-31 RX ADMIN — DOXYCYCLINE HYCLATE 100 MG: 100 TABLET, COATED ORAL at 22:07

## 2020-12-31 RX ADMIN — Medication 10 ML: at 22:13

## 2020-12-31 RX ADMIN — METOPROLOL TARTRATE 25 MG: 25 TABLET, FILM COATED ORAL at 18:53

## 2020-12-31 RX ADMIN — TOPIRAMATE 200 MG: 100 TABLET, FILM COATED ORAL at 22:07

## 2020-12-31 RX ADMIN — ZOLPIDEM TARTRATE 5 MG: 5 TABLET ORAL at 22:12

## 2020-12-31 NOTE — PROGRESS NOTES
12/31/2020  Case Management Note    2:43 PM  No reply from [facility] so I have also sent to [facility] [address] ENEDELIA [place]    1:51 PM  Per MD patient would like to go home with home O2, I let them know that with the right documentation we could do that today. I spoke to patient and she had no preference for O2 companies, so I sent a preliminary referral to [facility]; will add orders and documentation as they come.      ENEDELIA Robles

## 2020-12-31 NOTE — ED NOTES
12:07 PM  Discharged from the hospital 2 days ago with known Covid 19 diagnosis. Patient states she had \"bad pneumonia. \"  Has been checking her oxygenation at home, was 85% all day yesterday and this morning was in the 70s. Came to the emergency room for worsening hypoxia at the advice of her primary care doctor. I have evaluated the patient as the Provider in Triage. I have reviewed Her vital signs and the triage nurse assessment. I have talked with the patient and any available family and advised that I am the provider in triage and have ordered the appropriate study to initiate their work up based on the clinical presentation during my assessment. I have advised that the patient will be accommodated in the Main ED as soon as possible. I have also requested to contact the triage nurse or myself immediately if the patient experiences any changes in their condition during this brief waiting period.   Rubi Brice NP

## 2020-12-31 NOTE — PROGRESS NOTES
12/31/2020 Patient called [facility] reporting O2 saturations in the 80's. Patient was advised to return to hospital ED. Patient was resistant to return to ED saying she just wanted home O2.and refused to return to Hospital at this time. Patient reported her SOB had not worsened since discharge. [facility] called Dr. Dereje Delgado office to call patient and advise to go to ED. Spoke with Fei at PCP office, confirmed patient was contacted and sent to ED.

## 2020-12-31 NOTE — PROGRESS NOTES
[address] [facility] RESIDENCY PROGRAM  PROGRESS NOTE     12/31/2020  PCP: Kristie Leigh MD     [address] [facility] Medicine Residency Attending Addendum:  I saw and evaluated the patient on the day of the encounter with Dr. Elvin Petty, performing the burns elements of the service. I discussed the findings, assessment and plan with the resident and agree with the resident's findings and plan as documented in the resident's note. Patient comfortable in bed. Give dose of Lasix. Plan for Covid lab markers are improving. Worsening opacities. Will plan to discharge on home oxygen today and Decadron antibiotics. Hadley Yeung MD, FAAFP, CAQSM, RMSK      Assessment/Plan:     Denia Hallman is a 65 y.o. female with a PMHx of HTN, HLD, PUD, hx of GI bleed, spinal stenosis, migraine headaches who is admitted for acute hypoxic respiratory failure secondary to COVID PNA. Overnight events: none    Telemetry reviewed: NSR in the 90s     Acute Hypoxic Respiratory Failure 2/2 COVID PNA: Recently admitted from 12/26/2020-12/29/2020 for COVID PNA and discharged home after passing home O2 eval. Patient was discharged on Decadron 6 mg daily to complete 10 day course and Doxycycline 100 mg BID to complete 10 day course. Now with hypoxia and requiring supplemental O2. Inflammatory markers elevated (D-dimer 0.84, , CRP 7.12, ferritin 621), however lactate and procalcitonin wnl. Trop < 0.05. . CXR w/ diffuse patchy opacifications, worsened compared to prior study.    - trend daily covid labs: crp, ferritin, LD, d dimer   - Daily CBC, BMP  - Supplemental O2 to maintain O2 sats >88%, weans as tolerated   - Will need CM consult to arrange for home O2  - Continue Decadron 6 mg PO daily to complete previously prescribed 10 day course (12/27)  - Continue Doxycycline 100 mg PO BID to complete 10 day course (12/26)  - Vitamin C 500 mg BID, Zinc 220 mg daily, Mucinex 600 mg BID, Tessalon 100 mg TID PRN  - Combivent-Respimat inhaler q4h PRN     HTN: BP on admission 115/83. Hx of chronic tachycardia in the past. On home Metoprolol 25 mg BID.   - Continue home Metoprolol      HLD: Intolerant of statins. On home Zetia 10 mg daily and Gemfibrozil 600 mg BID.   - Continue home meds      Migraine Headaches: Stable. On home Topamax 200 mg BID as well as Relpax 40 mg as needed. - Continue home Topamax   - Pharm to sub Relpax if needed while inpatient      Seasonal Allergies:   - Continue home Zyrtec 10 mg daily      Osteoporosis: On Fosamax 70 mg weekly as well as vitamin D 2000 U daily. - Continue vitamin D while inpatient. Hold Fosamax.      Spinal stenosis: S/p fusion in . Stable. Patient ambulates with cane. - Tylenol PRN     Insomnia:   - Continue home Ambien 5 mg qhs.      Hx of PUD/GI Bleed:   - Protonix 40 mg PO daily while inpatient     Overweight: BMI 29.78 kg/m². - Encouraging lifestyle modifications and further follow up outpatient.        FEN/GI - Regular diet. Activity - Ambulate as tolerated  DVT prophylaxis - Lovenox  GI prophylaxis - Protonix  Fall prophylaxis - Not indicated at this time. Disposition - Consulting PT and CM  Code Status - Full. Discussed with patient. Next of Danya 69 Name and Ananda Buitrago- [phone]         Subjective:     No concerns overnight. Patient feels about the same as she did when she came in yesterday. Some SOB with talking. Denies chest pain, nausea, vomiting, abdominal pain, dizziness.      Objective:   Physical examination  Patient Vitals for the past 24 hrs:   Temp Pulse Resp BP SpO2   20 1600    100/64    20 1500    132/80 (!) 82 %   20 1400    (!) 140/87 93 %   20 1330     95 %   20 1300    115/83 94 %   20 1203 97.3 °F (36.3 °C) 85 20  91 %      Temp (24hrs), Av.3 °F (36.3 °C), Min:97.3 °F (36.3 °C), Max:97.3 °F (36.3 °C)     O2 Flow Rate (L/min): 2 l/min   O2 Device: Nasal cannula        Physical Exam performed by Dr. Ayala Kiser: No acute distress. Alert and cooperative. Respiratory: Mild crackles at bases bilaterally, otherwise good air movement  Cardiovascular: RRR. Pulses strong and regular. No murmurs, rubs, or gallops. GI: Soft, nontender, nondistended, no masses or organomegaly. Extremities: No lower extremity edema. Distal pulses intact  Skin: Warm and dry. No rashes or color changes  Neuro: Oriented x3. Data Review:     CBC:  Recent Labs     12/31/20  1359 12/29/20  1322   WBC 12.2* 11.6*   HGB 12.0 12.1   HCT 37.0 36.6   * 444     Metabolic Panel:  Recent Labs     12/31/20  1359 12/31/20  1301 12/29/20  0622    139 138   K 3.8 4.1 3.6   * 110* 107   CO2 24 21 23   BUN 20 20 25*   CREA 0.93 0.86 1.01   * 129* 86   CA 9.5 9.3 8.8   MG  --  1.7  --    ALB 2.9* 2.9*  --    TBILI 0.3 0.5  --    ALT 21 21  --    INR 1.2*  --   --      Micro:  Lab Results   Component Value Date/Time    Culture result: No growth (<1,000 CFU/ML) 12/29/2020 04:00 PM    Culture result: NO GROWTH 3 DAYS 12/27/2020 11:24 PM    Culture result: KLEBSIELLA PNEUMONIAE (A) 12/26/2020 05:59 PM     Imaging:  Cta Chest W Or W Wo Cont    Result Date: 12/26/2020  EXAM: CTA CHEST W OR W WO CONT INDICATION: Hypoxia, dyspnea,  + Covid 10 days ago. History of PE COMPARISON: None. CONTRAST: 100 mL of Isovue-370. TECHNIQUE: Precontrast  images were obtained to localize the volume for acquisition. Multislice helical CT arteriography was performed from the diaphragm to the thoracic inlet during uneventful rapid bolus intravenous contrast administration. Lung and soft tissue windows were generated. Coronal and sagittal images were generated and 3D post processing consisting of coronal maximum intensity images was performed. CT dose reduction was achieved through use of a standardized protocol tailored for this examination and automatic exposure control for dose modulation. FINDINGS: Patchy diffuse bilateral airspace disease in all lobes. Cardiomegaly. The pulmonary arteries are well enhanced and no pulmonary emboli are identified. There is no mediastinal or hilar adenopathy or mass. The aorta enhances normally without evidence of aneurysm or dissection. The visualized portions of the upper abdominal organs are remarkable for fatty infiltration of the pancreas. Mild superior endplate C16 compression deformity. IMPRESSION: Multilobar pneumonia No evidence for pulmonary embolism. Incidental cardiomegaly Mild superior endplate E53 compression deformity, age indeterminate    Xr Chest Port    Result Date: 12/31/2020  EXAM: XR CHEST PORT HISTORY: covid. COMPARISON: 12/29/2020 FINDINGS: Single view(s) of the chest. Diffuse patchy opacities have progressed throughout both lungs. No pleural effusion or pneumothorax. The cardiomediastinal silhouette is unremarkable. The visualized osseous structures are unremarkable. IMPRESSION: Diffuse patchy opacifications have progressed throughout both lungs. Xr Chest Port    Result Date: 12/29/2020  INDICATION: Covid19, pneumonia, acute hypoxemic respiratory failure. Portable AP upright view of the chest. Direct comparison made to prior chest x-ray dated December 27, 2020. Cardiomediastinal silhouette is stable. There is very mild bilateral midlung and bibasilar linear atelectasis, unchanged. There is no pleural fluid. There is no pneumothorax. IMPRESSION: No interval change. Xr Chest Port    Result Date: 12/27/2020  Indication: Fever Comparison to 12/26/2020. Portable exam obtained at [phone] demonstrates increasing bilateral lower lobe atelectasis compared to prior examination. No focal infiltrate is identified. Impression: Increasing bilateral lower lobe atelectasis.      Xr Chest Port    Result Date: 12/26/2020  EXAM: XR CHEST PORT INDICATION: dyspnea, cough COMPARISON: 6/9/2012 FINDINGS: A portable AP radiograph of the chest was obtained at 1315 hours. The patient is on a cardiac monitor. Linear streaky changes are present at the left lung base. The cardiac and mediastinal contours and pulmonary vascularity are remarkable for mild tortuosity of the descending aorta. The bones and soft tissues are grossly within normal limits. IMPRESSION: Left basilar subsegmental atelectasis    Xr Us Guided Vascular Access    Result Date: 12/29/2020  INDICATION:   NO VENOUS ACCESS  EXAMINATION:  US GUIDE VAS ACCESS FINDINGS: Ultrasound was provided for Midline placement. IMPRESSION: Ultrasound guidance for Midline  placement.  [facility]    Medications reviewed  Current Facility-Administered Medications   Medication Dose Route Frequency    sodium chloride (NS) flush 5-40 mL  5-40 mL IntraVENous Q8H    sodium chloride (NS) flush 5-40 mL  5-40 mL IntraVENous PRN    acetaminophen (TYLENOL) tablet 650 mg  650 mg Oral Q6H PRN    Or    acetaminophen (TYLENOL) suppository 650 mg  650 mg Rectal Q6H PRN    polyethylene glycol (MIRALAX) packet 17 g  17 g Oral DAILY PRN    enoxaparin (LOVENOX) injection 40 mg  40 mg SubCUTAneous DAILY    [START ON 1/1/2021] pantoprazole (PROTONIX) tablet 40 mg  40 mg Oral ACB    ascorbic acid (vitamin C) (VITAMIN C) tablet 500 mg  500 mg Oral BID    [START ON 1/1/2021] zinc sulfate (ZINCATE) 220 (50) mg capsule 1 Cap  1 Cap Oral DAILY    [START ON 1/1/2021] cetirizine (ZYRTEC) tablet 10 mg  10 mg Oral DAILY    [START ON 1/1/2021] cholecalciferol (VITAMIN D3) (1000 Units /25 mcg) tablet 2 Tab  2,000 Units Oral DAILY    [START ON 1/1/2021] dexAMETHasone (DECADRON) tablet 6 mg  6 mg Oral DAILY    doxycycline (VIBRA-TABS) tablet 100 mg  100 mg Oral Q12H    [START ON 1/1/2021] ezetimibe (ZETIA) tablet 10 mg  10 mg Oral DAILY    gemfibroziL (LOPID) tablet 600 mg  600 mg Oral ACB&D    metoprolol tartrate (LOPRESSOR) tablet 25 mg  25 mg Oral BID    topiramate (TOPAMAX) tablet 200 mg  200 mg Oral BID    zolpidem (AMBIEN) tablet 5 mg  5 mg Oral QHS PRN    benzonatate (TESSALON) capsule 100 mg  100 mg Oral TID PRN    guaiFENesin ER (MUCINEX) tablet 600 mg  600 mg Oral Q12H    ipratropium-albuterol (COMBIVENT RESPIMAT) 20 mcg-100 mcg inhalation spray  1 Puff Inhalation Q4H PRN     Current Outpatient Medications   Medication Sig    doxycycline (ADOXA) 100 mg tablet Take 1 Tab by mouth two (2) times a day for 13 doses. First dose 12/30/20 in the morning, last dose 1/5/2021 in the morning.  dexAMETHasone (DECADRON) 6 mg tablet Take once daily starting 12/30/20 for total of 7 doses.  glycerin-min oil-polycarbophil (Replens) gel Use nightly. Topical use only. Put thin layer in vulvar area.  zolpidem (AMBIEN) 5 mg tablet Take 5 mg by mouth nightly as needed for Sleep.  alendronate (FOSAMAX) 70 mg tablet Take 1 Tab by mouth every seven (7) days.  ezetimibe (ZETIA) 10 mg tablet TAKE 1 TABLET BY MOUTH DAILY    gemfibroziL (LOPID) 600 mg tablet TAKE 1 TABLET BY MOUTH TWICE DAILY    metoprolol tartrate (LOPRESSOR) 25 mg tablet TAKE 1 TABLET BY MOUTH TWICE DAILY    REPATHA SURECLICK pen injection 481 mg Once every 2 weeks.  eletriptan (RELPAX) 40 mg tablet Take 40 mg by mouth once as needed. may repeat in 2 hours if necessary    KRILL OIL PO Take 500 mg by mouth daily.  multivitamin (MULTIPLE VITAMINS PO) Take 1 Tab by mouth daily.  Lactobacillus acidophilus (ACIDOPHILUS PO) Take 1 Cap by mouth daily.  topiramate (TOPAMAX) 200 mg tablet Take 200 mg by mouth two (2) times a day.  cholecalciferol, vitamin D3, 2,000 unit tab Take 2,000 Units by mouth daily.  turmeric (CURCUMIN) Take 500 mg by mouth daily.  acetaminophen (TYLENOL EXTRA STRENGTH PO) Take 500 mg by mouth as needed. Takes 2 po as needed for leg or back pain.  cetirizine (ZYRTEC) 10 mg tablet Take 10 mg by mouth daily.            Signed:   Nancy Terry MD   Resident, Family Medicine

## 2020-12-31 NOTE — H&P
[address] [facility] [address] [place] [address]   Office [phone]  Fax [phone]       Admission H&P     Name: Alden Zuniga MRN: [id number]  Sex: Female   YOB: 1950  Age: 65 y.o. PCP: Jono Lewis MD     Source of Information: patient, medical records    Chief complaint: SOB    History of Present Illness  Alden Zuniga is a 65 y.o. female with PMHx of HTN, HLD, lumbar spinal stenosis, osteoporosis, and hx of PUD/GI bleed who presents to the ED complaining of low oxygen saturations at home. She was recently admitted to [facility] from 12/26/2020-12/29/2020 for COVID PNA. She is feeling better in general but does report continued dry cough and SOB, mostly with ambulation. She has been using a home pulse oximeter to monitor her O2 levels and reports that yesterday her O2 sats were in the 80s throughout the day. She called her PCP today when she noticed that her O2 sat was in the 70s, and her doctor recommended that she come to the ED to be evaluated. Patient denies any fever, chills, CP, palpitations, appetite changes, abdominal pain, nausea, vomiting, diarrhea, urinary complaints, or dizziness. Of note, RT performed home O2 evaluation in the ED and patient met criteria for home O2. Case management sent referrals to arrange for home O2 as patient did not want to be admitted to the hospital, however home O2 was not able to be arranged, so she is being admitted to the hospital.     COVID Questions:   Experiencing any of the following symptoms: fever, chills, cough, SOB, diarrhea, URI symptoms. yes  Any Sick contacts with fever, cough, diarrhea, SOB, URI symptoms. no  Traveled out of state or out of country. no  Lives with her .  Has been staying at home. yes, other than recent hospitalization     In the ED:  Vitals: Temp 97.3   /83   HR 85   RR 20   SatO2  91% on NC 2L  Labs: CBC not collected, CMP hemolyzed, lactate 1.4, procal 0.05, trop < 0.05, D-dimer 0.84, , CRP 7.12, BUN 20, Cr 0.86  Imaging: CXR with diffuse patchy opacifications, worsened compared to prior CXR on 12/29/2020  Treatment: placed on NC O2    EKG: not performed    Patient Vitals for the past 12 hrs:   Temp Pulse Resp BP SpO2   12/31/20 1600    100/64    12/31/20 1500    132/80 (!) 82 %   12/31/20 1400    (!) 140/87 93 %   12/31/20 1330     95 %   12/31/20 1300    115/83 94 %   12/31/20 1203 97.3 °F (36.3 °C) 85 20  91 %       Review of Systems  Review of Systems   Constitutional: Negative for activity change, appetite change, chills, fatigue and fever. HENT: Negative for congestion, ear pain and sore throat. Eyes: Negative for visual disturbance. Respiratory: Positive for cough and shortness of breath. Negative for wheezing. Cardiovascular: Negative for chest pain, palpitations and leg swelling. Gastrointestinal: Negative for abdominal pain, blood in stool, constipation, diarrhea, nausea and vomiting. Genitourinary: Negative for difficulty urinating and dysuria. Musculoskeletal: Negative for myalgias. Skin: Negative for color change and rash. Neurological: Negative for dizziness, weakness, numbness and headaches. Psychiatric/Behavioral: Negative for confusion. Home Medications   Prior to Admission medications    Medication Sig Start Date End Date Taking? Authorizing Provider   doxycycline (ADOXA) 100 mg tablet Take 1 Tab by mouth two (2) times a day for 13 doses. First dose 12/30/20 in the morning, last dose 1/5/2021 in the morning. 12/30/20 1/6/21  Liz England MD   dexAMETHasone (DECADRON) 6 mg tablet Take once daily starting 12/30/20 for total of 7 doses. 12/30/20   Liz England MD   glycerin-min oil-polycarbophil (Replens) gel Use nightly. Topical use only. Put thin layer in vulvar area. 12/29/20   Liz England MD   zolpidem (AMBIEN) 5 mg tablet Take 5 mg by mouth nightly as needed for Sleep.     Provider, Historical   alendronate (FOSAMAX) 70 mg tablet Take 1 Tab by mouth every seven (7) days. 12/8/20   Jerilyn Covarrubias MD   ezetimibe (ZETIA) 10 mg tablet TAKE 1 TABLET BY MOUTH DAILY 9/30/20   Jerilyn Covarrubias MD   gemfibroziL (LOPID) 600 mg tablet TAKE 1 TABLET BY MOUTH TWICE DAILY 9/30/20   Jerilyn Covarrubias MD   metoprolol tartrate (LOPRESSOR) 25 mg tablet TAKE 1 TABLET BY MOUTH TWICE DAILY 9/1/20   Jerilyn Covarrubias MD   REPATHA SURECLICK pen injection 439 mg Once every 2 weeks. 10/30/19   Provider, Historical   eletriptan (RELPAX) 40 mg tablet Take 40 mg by mouth once as needed. may repeat in 2 hours if necessary    Provider, Historical   KRILL OIL PO Take 500 mg by mouth daily. Provider, Historical   multivitamin (MULTIPLE VITAMINS PO) Take 1 Tab by mouth daily. Provider, Historical   Lactobacillus acidophilus (ACIDOPHILUS PO) Take 1 Cap by mouth daily. Provider, Historical   topiramate (TOPAMAX) 200 mg tablet Take 200 mg by mouth two (2) times a day. Provider, Historical   cholecalciferol, vitamin D3, 2,000 unit tab Take 2,000 Units by mouth daily. Provider, Historical   turmeric (CURCUMIN) Take 500 mg by mouth daily. Provider, Historical   acetaminophen (TYLENOL EXTRA STRENGTH PO) Take 500 mg by mouth as needed. Takes 2 po as needed for leg or back pain. Provider, Historical   cetirizine (ZYRTEC) 10 mg tablet Take 10 mg by mouth daily. Provider, Historical       Allergies  Allergies   Allergen Reactions    Nitrofurantoin Other (comments)     LOW WBC - 2000    Nsaids (Non-Steroidal Anti-Inflammatory Drug) Other (comments)     GI bleed.  Other Plant, Animal, Environmental Other (comments)     Mold, dust, cats, dog allergies. ENVIRONMENTAL ALLERGIES.     Statins-Hmg-Coa Reductase Inhibitors Other (comments)     LFT increases    Tolmetin Unknown (comments)    Toradol [Ketorolac Tromethamine] Rash       Past Medical History  Past Medical History:   Diagnosis Date    Aneurysm ([address]     splenic artery aneurysm - no longer needs to be followed up - will never be a problem    Arthritis     back    Chronic pain     back    CKD (chronic kidney disease) stage 2, GFR 60-89 ml/min     History of vascular access device 12/28/2020    4 Fr midilne, 10 cm L basilic, poor access    Hyperlipidemia     Hypertension     Ill-defined condition     walks with a cane    Ill-defined condition     cardiac calcium test - some build up/stress test is \"fine\" per pt    Lumbar stenosis     Migraine     Other ill-defined conditions(799.89)     wheezing with allergies    Other ill-defined conditions(799.89)     GI bleed - stomach - NSAID use    Overactive bladder     PUD (peptic ulcer disease)     Stenosis of lumbosacral spine     Thromboembolus ([address]     PE       Previous Hospitalization(s)  Past Surgical History:   Procedure Laterality Date    COLONOSCOPY N/A 10/1/2018    COL[facility]COPY performed [facility] Harvey [facility] at [facility] ENDOSCOPY    HX HYSTERECTOMY      HX ORTHOPAEDIC  2009    spinal fusion/has not had a lumbar laminectomy    HX ORTHOPAEDIC Bilateral     bunionectomy - bilateral once and unilateral once    HX ORTHOPAEDIC Bilateral     carpal tunnel release    HX TONSILLECTOMY      T & A    HX UROLOGICAL  2012    bladder repair       Family History  Family History   Problem Relation Age of Onset    Cancer Father         bladder    Cancer Brother         BCCA    Stroke Mother     Cancer Maternal Uncle         leukemia    Breast Cancer Paternal Aunt     Stroke Maternal Grandmother     Colon Cancer Paternal Grandfather     Cancer Maternal Uncle         throat       Social History  Alcohol history: Rarely  Smoking history: Non-smoker  Illicit drug history: Not at all  Living arrangement: patient lives with their spouse. Ambulates:  With cane    Vital Signs  Visit Vitals  /64   Pulse 85   Temp 97.3 °F (36.3 °C)   Resp 20   Wt 157 lb 10.1 oz (71.5 kg)   SpO2 (!) 82% BMI 29.78 kg/m²     *due to COVID-19 pandemic and limitations on patient contact the physical exam has been performed and reported by [facility] Medicine senior resident, Dr. Yanci Skelton*    Physical Exam  General: No acute distress. Alert. Cooperative. Obese. NC O2 in place. Head: Normocephalic. Atraumatic. Eyes:              Conjunctiva pink. Sclera white. PERRL. Ears:              Hearing grossly intact. Nose:             Septum midline. Mucosa pink. No drainage. Throat: Mucosa pink. Moist mucous membranes. Neck: Supple. Normal ROM. No stiffness. Respiratory: No respiratory distress. Decreased breath sounds in lower lung fields but otherwise good air movement. Cardiovascular: RRR. Normal S1,S2. No murmur. Pulses 2+ throughout. GI: + bowel sounds. Nontender. No rebound tenderness or guarding. Nondistended. Extremities: No LE edema. Distal pulses intact. Musculoskeletal: Full ROM in all extremities. Skin: Warm, dry. No rashes. Neuro: Alert and oriented. No focal deficits. Laboratory Data  Recent Results (from the past 8 hour(s))   TROPONIN I    Collection Time: 12/31/20  1:01 PM   Result Value Ref Range    Troponin-I, Qt. <0.05 <9.46 ng/mL   METABOLIC PANEL, COMPREHENSIVE    Collection Time: 12/31/20  1:01 PM   Result Value Ref Range    Sodium 139 136 - 145 mmol/L    Potassium 4.1 3.5 - 5.1 mmol/L    Chloride 110 (H) 97 - 108 mmol/L    CO2 21 21 - 32 mmol/L    Anion gap 8 5 - 15 mmol/L    Glucose 129 (H) 65 - 100 mg/dL    BUN 20 6 - 20 MG/DL    Creatinine 0.86 0.55 - 1.02 MG/DL    BUN/Creatinine ratio 23 (H) 12 - 20      GFR est AA >60 >60 ml/min/1.73m2    GFR est non-AA >60 >60 ml/min/1.73m2    Calcium 9.3 8.5 - 10.1 MG/DL    Bilirubin, total 0.5 0.2 - 1.0 MG/DL    ALT (SGPT) 21 12 - 78 U/L    AST (SGOT) 33 15 - 37 U/L    Alk.  phosphatase 94 45 - 117 U/L    Protein, total 7.0 6.4 - 8.2 g/dL    Albumin 2.9 (L) 3.5 - 5.0 g/dL    Globulin 4.1 (H) 2.0 - 4.0 g/dL    A-G Ratio 0.7 (L) 1.1 - 2.2     MAGNESIUM    Collection Time: 12/31/20  1:01 PM   Result Value Ref Range    Magnesium 1.7 1.6 - 2.4 mg/dL   PROCALCITONIN    Collection Time: 12/31/20  1:01 PM   Result Value Ref Range    Procalcitonin 0.05 ng/mL   C REACTIVE PROTEIN, QT    Collection Time: 12/31/20  1:01 PM   Result Value Ref Range    C-Reactive protein 7.12 (H) 0.00 - 0.60 mg/dL   LD    Collection Time: 12/31/20  1:01 PM   Result Value Ref Range     (H) 81 - 246 U/L   PROTHROMBIN TIME + INR    Collection Time: 12/31/20  1:59 PM   Result Value Ref Range    INR 1.2 (H) 0.9 - 1.1      Prothrombin time 11.9 (H) 9.0 - 11.1 sec   PTT    Collection Time: 12/31/20  1:59 PM   Result Value Ref Range    aPTT 27.1 22.1 - 31.0 sec    aPTT, therapeutic range     58.0 - 77.0 SECS   D DIMER    Collection Time: 12/31/20  1:59 PM   Result Value Ref Range    D-dimer 0.84 (H) 0.00 - 0.65 mg/L FEU   LACTIC ACID    Collection Time: 12/31/20  1:59 PM   Result Value Ref Range    Lactic acid 1.4 0.4 - 2.0 MMOL/L   FIBRINOGEN    Collection Time: 12/31/20  1:59 PM   Result Value Ref Range    Fibrinogen 790 (H) 200 - 475 mg/dL   SAMPLES BEING HELD    Collection Time: 12/31/20  1:59 PM   Result Value Ref Range    SAMPLES BEING HELD 1PST,1LAV,2RED,1BLDSC(PURPLE AND BLUE)     COMMENT        Add-on orders for these samples will be processed based on acceptable specimen integrity and analyte stability, which may vary by analyte. Imaging  CXR Results  (Last 48 hours)               12/31/20 1515  XR CHEST PORT Final result    Impression:  IMPRESSION:   Diffuse patchy opacifications have progressed throughout both lungs. Narrative:  EXAM: XR CHEST PORT       HISTORY: covid. COMPARISON: 12/29/2020       FINDINGS: Single view(s) of the chest. Diffuse patchy opacities have progressed   throughout both lungs. No pleural effusion or pneumothorax. The   cardiomediastinal silhouette is unremarkable.   The visualized osseous structures   are unremarkable. CT Results  (Last 48 hours)    None            Assessment and Plan     Queenie Craig is a 65 y.o. female with a PMHx of HTN, HLD, PUD, hx of GI bleed, spinal stenosis, migraine headaches who is admitted for acute hypoxic respiratory failure secondary to COVID PNA. Acute Hypoxic Respiratory Failure 2/2 COVID PNA: Recently admitted from 12/26/2020-12/29/2020 for COVID PNA and discharged home after passing home O2 eval. Patient was discharged on Decadron 6 mg daily to complete 10 day course and Doxycycline 100 mg BID to complete 10 day course. Now with hypoxia and requiring supplemental O2. Inflammatory markers elevated (D-dimer 0.84, , CRP 7.12), however lactate and procalcitonin wnl. Trop < 0.05. CXR w/ diffuse patchy opacifications, worsened compared to prior study. - Admit to remote telemetry   - VS per unit routine   - Check CBC as this was not done in the ED  - Repeat CMP as sample was hemolyzed   - Check BNP  - Daily CBC, BMP  - Trend COVID labs daily - CRP, D-dimer, ferritin, LD  - Supplemental O2 to maintain O2 sats >88%, weans as tolerated   - Will need CM consult to arrange for home O2  - Continue Decadron 6 mg PO daily x 5 doses to complete previously prescribed 10 day course   - Continue Doxycycline 100 mg PO BID x 10 doses to complete previously prescribed 10 day course   - Vitamin C 500 mg BID, Zinc 220 mg daily, Mucinex 600 mg BID, Tessalon 100 mg TID PRN  - Combivent-Respimat inhaler q4h PRN    HTN: BP on admission 15/83. On home Metoprolol 25 mg BID.   - Continue home Metoprolol     HLD: Intolerant of statins. On home Zetia 10 mg daily and Gemfibrozil 600 mg BID.   - Continue home meds     Migraine Headaches: Stable. On home Topamax 200 mg BID as well as Relpax 40 mg as needed.   - Continue home Topamax   - Pharm to sub Relpax if needed while inpatient     Seasonal Allergies:   - Continue home Zyrtec 10 mg daily     Osteoporosis: On Fosamax 70 mg weekly as well as vitamin D 2000 U daily. - Continue vitamin D while inpatient. Hold Fosamax. Spinal stenosis: S/p fusion in 2009. Stable. Patient ambulates with cane. - Tylenol PRN    Insomnia:   - Continue home Ambien 5 mg qhs. Hx of PUD/GI Bleed:   - Protonix 40 mg PO daily while inpatient     Overweight:  - The pt's Body mass index is 29.78 kg/m². - Encouraging lifestyle modifications and further follow up outpatient. FEN/GI - Regular diet. Activity - Ambulate as tolerated  DVT prophylaxis - Lovenox  GI prophylaxis - Protonix  Fall prophylaxis - Not indicated at this time. Disposition - Admit to Remote Telemetry. Plan to d/c to Home. Consulting PT and CM  Code Status - Full. Discussed with patient. Next of Kin Name and Meryle Speak- [phone]    Patient Belinda Bharathi will be discussed with Dr. Ab Mcduffie.    5:09 PM, 12/31/20  Mane Cohen DO   Family Medicine Resident       For Billing    Chief Complaint   Patient presents with   [address] [place] [address] Problems  Date Reviewed: 10/12/2020          Codes Class Noted POA    Acute respiratory failure with hypoxia [facility]) ICD-10-CM: J96.01  ICD-9-CM: 518.81  12/31/2020 Unknown        COVID-19 ICD-10-CM: U07.1  ICD-9-CM: 079.89  12/31/2020 Unknown               [address] Medicine Residency Attending Addendum:  I discussed the findings, assessment and plan with the resident and agree with the resident's findings and plan as documented in the resident's note.       Ab Mcduffie MD, FAAFP, CAQSM, RMSK

## 2020-12-31 NOTE — PROGRESS NOTES
[facility]: MED RECONCILIATION    Comments/Recommendations:     Pt discharged 2 days ago - please see previous discharge summary   Pt states she has had all of her morning medications today PTA   Information obtained from: RX Query/Pharmacy records    Allergies: Nitrofurantoin; Nsaids (non-steroidal anti-inflammatory drug); Other plant, animal, environmental; Statins-hmg-coa reductase inhibitors; Tolmetin; and Toradol [ketorolac tromethamine]    Prior to Admission Medications:     Prior to Admission Medications   Prescriptions Last Dose Informant Patient Reported? Taking? KRILL OIL PO   Yes No   Sig: Take 500 mg by mouth daily. Lactobacillus acidophilus (ACIDOPHILUS PO)   Yes No   Sig: Take 1 Cap by mouth daily. REPATHA SURECLICK pen injection   Yes No   Si mg Once every 2 weeks. acetaminophen (TYLENOL EXTRA STRENGTH PO)   Yes No   Sig: Take 500 mg by mouth as needed. Takes 2 po as needed for leg or back pain. alendronate (FOSAMAX) 70 mg tablet   No No   Sig: Take 1 Tab by mouth every seven (7) days. cetirizine (ZYRTEC) 10 mg tablet   Yes No   Sig: Take 10 mg by mouth daily. cholecalciferol, vitamin D3, 2,000 unit tab   Yes No   Sig: Take 2,000 Units by mouth daily. dexAMETHasone (DECADRON) 6 mg tablet   No No   Sig: Take once daily starting 20 for total of 7 doses. doxycycline (ADOXA) 100 mg tablet   No No   Sig: Take 1 Tab by mouth two (2) times a day for 13 doses. First dose 20 in the morning, last dose 2021 in the morning. eletriptan (RELPAX) 40 mg tablet   Yes No   Sig: Take 40 mg by mouth once as needed. may repeat in 2 hours if necessary   ezetimibe (ZETIA) 10 mg tablet   No No   Sig: TAKE 1 TABLET BY MOUTH DAILY   gemfibroziL (LOPID) 600 mg tablet   No No   Sig: TAKE 1 TABLET BY MOUTH TWICE DAILY   glycerin-min oil-polycarbophil (Replens) gel   No No   Sig: Use nightly. Topical use only. Put thin layer in vulvar area.    metoprolol tartrate (LOPRESSOR) 25 mg tablet   No No   Sig: TAKE 1 TABLET BY MOUTH TWICE DAILY   multivitamin (MULTIPLE VITAMINS PO)   Yes No   Sig: Take 1 Tab by mouth daily. topiramate (TOPAMAX) 200 mg tablet   Yes No   Sig: Take 200 mg by mouth two (2) times a day. turmeric (CURCUMIN)   Yes No   Sig: Take 500 mg by mouth daily. zolpidem (AMBIEN) 5 mg tablet   Yes No   Sig: Take 5 mg by mouth nightly as needed for Sleep. Facility-Administered Medications: None         [employer]. JUSTIN    Clinical Staff Pharmacist  [address] [place] [address]  [phone]

## 2020-12-31 NOTE — ED TRIAGE NOTES
Patient is covid positive, reports being hospitalized for a week at [facility] with discharge 2 days ago. Reports yesterday O2 was 85% all day long/ O2 87% after ambulation to triage. 2L O2 applied, O2 increased to 94%. Denies any chest pain.

## 2020-12-31 NOTE — ED PROVIDER NOTES
Date of Service:  12/31/2020    Patient:  Jossy Avina    Chief Complaint:  Positive For Covid-19       HPI:  Jossy Avina is a 65 y.o.  female who presents for evaluation of hypoxia. Patient was recently hospitalized for Covid related issues. She is been discharged for 2 days now. She has been keeping track of her O2 saturation this morning woke up and she was in the 70s. She has exertional dyspnea and symptoms related to exertion but at rest feels okay. Yesterday she notes that her oxygen saturation was no higher than 85%. She called her healthcare provider and was asked to come to the emergency department for evaluation. She denies chest pain vomiting fevers chills or other acute complaints. Her main complaint is that as below oxygen and the exertional dyspnea.   Patient arrives hypoxic in the waiting room, requiring 2 L of nasal cannula oxygen to maintain a saturation of 95%           Past Medical History:   Diagnosis Date    Aneurysm ([address]     splenic artery aneurysm - no longer needs to be followed up - will never be a problem    Arthritis     back    Chronic pain     back    CKD (chronic kidney disease) stage 2, GFR 60-89 ml/min     History of vascular access device 12/28/2020    4 Fr midilne, 10 cm L basilic, poor access    Hyperlipidemia     Hypertension     Ill-defined condition     walks with a cane    Ill-defined condition     cardiac calcium test - some build up/stress test is \"fine\" per pt    Lumbar stenosis     Migraine     Other ill-defined conditions(799.89)     wheezing with allergies    Other ill-defined conditions(799.89)     GI bleed - stomach - NSAID use    Overactive bladder     PUD (peptic ulcer disease)     Stenosis of lumbosacral spine     Thromboembolus ([address]     PE       Past Surgical History:   Procedure Laterality Date    COLONOSCOPY N/A 10/1/2018    COLONOSCOPY performed [facility] at [facility] ENDOSCOPY    HX HYSTERECTOMY      HX ORTHOPAEDIC  2009 spinal fusion/has not had a lumbar laminectomy    HX ORTHOPAEDIC Bilateral     bunionectomy - bilateral once and unilateral once    HX ORTHOPAEDIC Bilateral     carpal tunnel release    HX TONSILLECTOMY      T & A    HX UROLOGICAL  2012    bladder repair         Family History:   Problem Relation Age of Onset    Cancer Father         bladder    Cancer Brother         BCCA    Stroke Mother     Cancer Maternal Uncle         leukemia    Breast Cancer Paternal Aunt     Stroke Maternal Grandmother     Colon Cancer Paternal Grandfather     Cancer Maternal Uncle         throat       Social History     Socioeconomic History    Marital status:      Spouse name: Not on file    Number of children: Not on file    Years of education: Not on file    Highest education level: Not on file   Occupational History    Not on file   Social Needs    Financial resource strain: Not on file    Food insecurity     Worry: Not on file     Inability: Not on file    Transportation needs     Medical: Not on file     Non-medical: Not on file   Tobacco Use    Smoking status: Never Smoker    Smokeless tobacco: Never Used   Substance and Sexual Activity    Alcohol use: Yes     Comment: very, very rare    Drug use: No    Sexual activity: Yes     Partners: Male   Lifestyle    Physical activity     Days per week: Not on file     Minutes per session: Not on file    Stress: Not on file   Relationships    Social connections     Talks on phone: Not on file     Gets together: Not on file     Attends [employer] service: Not on file     Active member of club or organization: Not on file     Attends meetings of clubs or organizations: Not on file     Relationship status: Not on file    Intimate partner violence     Fear of current or ex partner: Not on file     Emotionally abused: Not on file     Physically abused: Not on file     Forced sexual activity: Not on file   Other Topics Concern   [address] Service Not Asked    Blood Transfusions Not Asked    Caffeine Concern Not Asked    Occupational Exposure Not Asked    Hobby Hazards Not Asked    Sleep Concern Not Asked    Stress Concern Not Asked    Weight Concern Not Asked    Special Diet Not Asked    Back Care Not Asked    Exercise Not Asked    Bike Helmet Not Asked   [address] Not Asked    Self-Exams Not Asked   Social History Narrative    Not on file         ALLERGIES: Nitrofurantoin; Nsaids (non-steroidal anti-inflammatory drug); Other plant, animal, environmental; Statins-hmg-coa reductase inhibitors; Tolmetin; and Toradol [ketorolac tromethamine]    Review of Systems   Constitutional: Negative for fever. HENT: Negative for hearing loss. Eyes: Negative for visual disturbance. Respiratory: Positive for cough and shortness of breath. Cardiovascular: Negative for chest pain. Gastrointestinal: Negative for abdominal pain. Genitourinary: Negative for flank pain. Musculoskeletal: Negative for back pain. Skin: Negative for rash. Neurological: Negative for dizziness, weakness and light-headedness. Psychiatric/Behavioral: Negative for confusion. Vitals:    12/31/20 1203   Pulse: 85   Resp: 20   Temp: 97.3 °F (36.3 °C)   SpO2: 91%   Weight: 71.5 kg (157 lb 10.1 oz)            Physical Exam  Vitals signs and nursing note reviewed. Constitutional:       General: She is not in acute distress. Appearance: She is well-developed. She is not ill-appearing or diaphoretic. HENT:      Head: Normocephalic and atraumatic. Eyes:      General: No scleral icterus. Neck:      Musculoskeletal: Neck supple. Vascular: No JVD. Trachea: No tracheal deviation. Cardiovascular:      Rate and Rhythm: Normal rate and regular rhythm. Heart sounds: No murmur. Pulmonary:      Effort: Pulmonary effort is normal. No respiratory distress. Breath sounds: Normal breath sounds. No stridor. No wheezing, rhonchi or rales.    Abdominal:      General: Bowel sounds are normal.      Palpations: Abdomen is soft. Tenderness: There is no abdominal tenderness. Musculoskeletal: Normal range of motion. General: No tenderness. Right lower leg: No edema. Left lower leg: No edema. Skin:     General: Skin is warm and dry. Capillary Refill: Capillary refill takes less than 2 seconds. Neurological:      Mental Status: She is alert and oriented to person, place, and time. Psychiatric:         Mood and Affect: Mood normal.         Behavior: Behavior normal.          MDM  Number of Diagnoses or Management Options    ED Course as of Dec 31 1657   Thu Dec 31, 2020   1400 EKG 1236  NSR 76  Normal axis and intervals  No acute ischemic changes    [GG]   1447 Awaiting imaging    [GG]      ED Course User Index  [GG] Martin Ramos,      VITAL SIGNS:  Patient Vitals for the past 4 hrs:   BP SpO2   12/31/20 1600 100/64    12/31/20 1500 132/80 (!) 82 %   12/31/20 1400 (!) 140/87 93 %   12/31/20 1330  95 %   12/31/20 1300 115/83 94 %         LABS:  Recent Results (from the past 6 hour(s))   TROPONIN I    Collection Time: 12/31/20  1:01 PM   Result Value Ref Range    Troponin-I, Qt. <0.05 <7.60 ng/mL   METABOLIC PANEL, COMPREHENSIVE    Collection Time: 12/31/20  1:01 PM   Result Value Ref Range    Sodium 139 136 - 145 mmol/L    Potassium 4.1 3.5 - 5.1 mmol/L    Chloride 110 (H) 97 - 108 mmol/L    CO2 21 21 - 32 mmol/L    Anion gap 8 5 - 15 mmol/L    Glucose 129 (H) 65 - 100 mg/dL    BUN 20 6 - 20 MG/DL    Creatinine 0.86 0.55 - 1.02 MG/DL    BUN/Creatinine ratio 23 (H) 12 - 20      GFR est AA >60 >60 ml/min/1.73m2    GFR est non-AA >60 >60 ml/min/1.73m2    Calcium 9.3 8.5 - 10.1 MG/DL    Bilirubin, total 0.5 0.2 - 1.0 MG/DL    ALT (SGPT) 21 12 - 78 U/L    AST (SGOT) 33 15 - 37 U/L    Alk.  phosphatase 94 45 - 117 U/L    Protein, total 7.0 6.4 - 8.2 g/dL    Albumin 2.9 (L) 3.5 - 5.0 g/dL    Globulin 4.1 (H) 2.0 - 4.0 g/dL    A-G Ratio 0.7 (L) 1.1 - 2. 2     MAGNESIUM    Collection Time: 12/31/20  1:01 PM   Result Value Ref Range    Magnesium 1.7 1.6 - 2.4 mg/dL   PROCALCITONIN    Collection Time: 12/31/20  1:01 PM   Result Value Ref Range    Procalcitonin 0.05 ng/mL   C REACTIVE PROTEIN, QT    Collection Time: 12/31/20  1:01 PM   Result Value Ref Range    C-Reactive protein 7.12 (H) 0.00 - 0.60 mg/dL   LD    Collection Time: 12/31/20  1:01 PM   Result Value Ref Range     (H) 81 - 246 U/L   PROTHROMBIN TIME + INR    Collection Time: 12/31/20  1:59 PM   Result Value Ref Range    INR 1.2 (H) 0.9 - 1.1      Prothrombin time 11.9 (H) 9.0 - 11.1 sec   PTT    Collection Time: 12/31/20  1:59 PM   Result Value Ref Range    aPTT 27.1 22.1 - 31.0 sec    aPTT, therapeutic range     58.0 - 77.0 SECS   D DIMER    Collection Time: 12/31/20  1:59 PM   Result Value Ref Range    D-dimer 0.84 (H) 0.00 - 0.65 mg/L FEU   LACTIC ACID    Collection Time: 12/31/20  1:59 PM   Result Value Ref Range    Lactic acid 1.4 0.4 - 2.0 MMOL/L   FIBRINOGEN    Collection Time: 12/31/20  1:59 PM   Result Value Ref Range    Fibrinogen 790 (H) 200 - 475 mg/dL   SAMPLES BEING HELD    Collection Time: 12/31/20  1:59 PM   Result Value Ref Range    SAMPLES BEING HELD 1PST,1LAV,2RED,1BLDSC(PURPLE AND BLUE)     COMMENT        Add-on orders for these samples will be processed based on acceptable specimen integrity and analyte stability, which may vary by analyte. IMAGING:  XR CHEST PORT   Final Result   IMPRESSION:   Diffuse patchy opacifications have progressed throughout both lungs. Medications During Visit:  Medications - No data to display      DECISION MAKING:  Marie England is a 65 y.o. female who comes in as above. Here, patient appears well. She is hypoxic without oxygen and requires supplemental oxygen.   6-minute test performed, she will need to remain in the hospital.  1 point we did try to send the patient home with home O2 getting set up however based on timing, this was unable to be performed. Patient will be brought in the [facility] service. She is been hemodynamically stable here    Adán Evangelista was evaluated in the Emergency Department on 12/31/2020 for the symptoms described in the history of present illness. He/she was evaluated in the context of the global COVID-19 pandemic, which necessitated consideration that the patient might be at risk for infection with the SARS-CoV-2 virus that causes COVID-19. Institutional protocols and algorithms that pertain to the evaluation of patients at risk for COVID-19 are in a state of rapid change based on information released by regulatory bodies including the CDC and federal and state organizations. These policies and algorithms were followed during the patient's care in the ED. Surrogate Decision Maker (Who do you want to make decisions for you in the event you are not able to?): Extended Emergency Contact Information  Primary Emergency Contact: DonaldsonNikhil freeman  Address: [facility] Phone: [phone]  Mobile Phone: [phone]  Relation: Friend  Secondary Emergency Contact: [address] [place] [address] Phone: [phone]  Mobile Phone: [phone]  Relation: Child    Ventilation (Do you want to be intubated and mechanically ventilated?):  Yes    CPR (Do you want chest compressions and electricity in an attempt to restart your heart?): Yes      IMPRESSION:  1. Hypoxia    2. COVID-19        DISPOSITION:  Admitted        Procedures        Perfect Serve Consult for Admission  4:58 PM    ED Room Number: ER06/06  Patient Name and age:  Adán Evangelista 65 y.o.  female  Working Diagnosis:   1. Hypoxia    2.  COVID-19        COVID-19 Suspicion:  yes  Sepsis present:  no  Reassessment needed: no  Code Status:  Full Code  Readmission: yes  Isolation Requirements:  Other  Recommended Level of Care:  telemetry  Department:[place] [facility] - [phone]) [phone]  Other:  Known covid, hypoxic. Requiring o2. We have tried to discharge with home o2 but will not be able to do so. Will require obs/admission and home o2 after.

## 2020-12-31 NOTE — PROGRESS NOTES
I walked PT for home O2 evaluation. PT needs 3-4 L/M when resting  and 4 L/M during walk and activity.       12/31/20 1640 12/31/20 1642 12/31/20 1644   RT Walking Oximetry   Stage Resting (Room Air) During Walk (on O2) During Walk (on O2)   SpO2 (!) 84 % (!) 87 % 90 %   HR 85 bpm 88 bpm 91 bpm   Rate of Dyspnea 1 1 1   Symptoms Shortness of Breath Shortness of Breath Shortness of Breath   O2 Device None (Room air) Nasal cannula Nasal cannula   O2 Flow Rate (L/min)  --  2 l/min 4 l/min   Walk/Assistive Device  --  [employer]      12/31/20 1646   RT Walking Oximetry   Stage After Walk   SpO2 92 %   HR 98 bpm   Rate of Dyspnea 1   Symptoms Shortness of Breath   O2 Device Nasal cannula   O2 Flow Rate (L/min) 4 l/min   Walk/Assistive Device  --

## 2021-01-01 VITALS
BODY MASS INDEX: 29.78 KG/M2 | RESPIRATION RATE: 18 BRPM | SYSTOLIC BLOOD PRESSURE: 122 MMHG | HEART RATE: 82 BPM | WEIGHT: 157.63 LBS | DIASTOLIC BLOOD PRESSURE: 78 MMHG | TEMPERATURE: 97.9 F | OXYGEN SATURATION: 90 %

## 2021-01-01 LAB
ANION GAP SERPL CALC-SCNC: 5 MMOL/L (ref 5–15)
BASOPHILS # BLD: 0 K/UL (ref 0–0.1)
BASOPHILS NFR BLD: 0 % (ref 0–1)
BUN SERPL-MCNC: 20 MG/DL (ref 6–20)
BUN/CREAT SERPL: 22 (ref 12–20)
CALCIUM SERPL-MCNC: 9.6 MG/DL (ref 8.5–10.1)
CHLORIDE SERPL-SCNC: 111 MMOL/L (ref 97–108)
CO2 SERPL-SCNC: 24 MMOL/L (ref 21–32)
CREAT SERPL-MCNC: 0.93 MG/DL (ref 0.55–1.02)
CRP SERPL-MCNC: 6.1 MG/DL (ref 0–0.6)
D DIMER PPP FEU-MCNC: 0.59 MG/L FEU (ref 0–0.65)
DIFFERENTIAL METHOD BLD: ABNORMAL
EOSINOPHIL # BLD: 0 K/UL (ref 0–0.4)
EOSINOPHIL NFR BLD: 0 % (ref 0–7)
ERYTHROCYTE [DISTWIDTH] IN BLOOD BY AUTOMATED COUNT: 13.5 % (ref 11.5–14.5)
FERRITIN SERPL-MCNC: 604 NG/ML (ref 8–252)
GLUCOSE SERPL-MCNC: 79 MG/DL (ref 65–100)
HCT VFR BLD AUTO: 39.3 % (ref 35–47)
HGB BLD-MCNC: 12.5 G/DL (ref 11.5–16)
IMM GRANULOCYTES # BLD AUTO: 0 K/UL
IMM GRANULOCYTES NFR BLD AUTO: 0 %
LDH SERPL L TO P-CCNC: 278 U/L (ref 81–246)
LYMPHOCYTES # BLD: 4.1 K/UL (ref 0.8–3.5)
LYMPHOCYTES NFR BLD: 34 % (ref 12–49)
MCH RBC QN AUTO: 28.4 PG (ref 26–34)
MCHC RBC AUTO-ENTMCNC: 31.8 G/DL (ref 30–36.5)
MCV RBC AUTO: 89.3 FL (ref 80–99)
MONOCYTES # BLD: 0.6 K/UL (ref 0–1)
MONOCYTES NFR BLD: 5 % (ref 5–13)
NEUTS BAND NFR BLD MANUAL: 6 % (ref 0–6)
NEUTS SEG # BLD: 7.5 K/UL (ref 1.8–8)
NEUTS SEG NFR BLD: 55 % (ref 32–75)
NRBC # BLD: 0 K/UL (ref 0–0.01)
NRBC BLD-RTO: 0 PER 100 WBC
PLATELET # BLD AUTO: 463 K/UL (ref 150–400)
PMV BLD AUTO: 10.9 FL (ref 8.9–12.9)
POTASSIUM SERPL-SCNC: 3.9 MMOL/L (ref 3.5–5.1)
RBC # BLD AUTO: 4.4 M/UL (ref 3.8–5.2)
RBC MORPH BLD: ABNORMAL
SODIUM SERPL-SCNC: 140 MMOL/L (ref 136–145)
WBC # BLD AUTO: 12.2 K/UL (ref 3.6–11)

## 2021-01-01 PROCEDURE — 83615 LACTATE (LD) (LDH) ENZYME: CPT

## 2021-01-01 PROCEDURE — 85025 COMPLETE CBC W/AUTO DIFF WBC: CPT

## 2021-01-01 PROCEDURE — 94760 N-INVAS EAR/PLS OXIMETRY 1: CPT

## 2021-01-01 PROCEDURE — 36415 COLL VENOUS BLD VENIPUNCTURE: CPT

## 2021-01-01 PROCEDURE — 85379 FIBRIN DEGRADATION QUANT: CPT

## 2021-01-01 PROCEDURE — 74011250636 HC RX REV CODE- 250/636: Performed by: STUDENT IN AN ORGANIZED HEALTH CARE EDUCATION/TRAINING PROGRAM

## 2021-01-01 PROCEDURE — 99235 HOSP IP/OBS SAME DATE MOD 70: CPT | Performed by: FAMILY MEDICINE

## 2021-01-01 PROCEDURE — 86140 C-REACTIVE PROTEIN: CPT

## 2021-01-01 PROCEDURE — 96374 THER/PROPH/DIAG INJ IV PUSH: CPT

## 2021-01-01 PROCEDURE — 99218 HC RM OBSERVATION: CPT

## 2021-01-01 PROCEDURE — 80048 BASIC METABOLIC PNL TOTAL CA: CPT

## 2021-01-01 PROCEDURE — 77010033678 HC OXYGEN DAILY

## 2021-01-01 PROCEDURE — 74011250637 HC RX REV CODE- 250/637: Performed by: STUDENT IN AN ORGANIZED HEALTH CARE EDUCATION/TRAINING PROGRAM

## 2021-01-01 PROCEDURE — 82728 ASSAY OF FERRITIN: CPT

## 2021-01-01 RX ORDER — FUROSEMIDE 10 MG/ML
20 INJECTION INTRAMUSCULAR; INTRAVENOUS ONCE
Status: COMPLETED | OUTPATIENT
Start: 2021-01-01 | End: 2021-01-01

## 2021-01-01 RX ADMIN — GUAIFENESIN 600 MG: 600 TABLET ORAL at 09:41

## 2021-01-01 RX ADMIN — DEXAMETHASONE 6 MG: 6 TABLET ORAL at 09:42

## 2021-01-01 RX ADMIN — GEMFIBROZIL 600 MG: 600 TABLET ORAL at 06:08

## 2021-01-01 RX ADMIN — DOXYCYCLINE HYCLATE 100 MG: 100 TABLET, COATED ORAL at 09:42

## 2021-01-01 RX ADMIN — TOPIRAMATE 200 MG: 100 TABLET, FILM COATED ORAL at 09:42

## 2021-01-01 RX ADMIN — METOPROLOL TARTRATE 25 MG: 25 TABLET, FILM COATED ORAL at 09:42

## 2021-01-01 RX ADMIN — Medication 10 ML: at 06:09

## 2021-01-01 RX ADMIN — PANTOPRAZOLE SODIUM 40 MG: 40 TABLET, DELAYED RELEASE ORAL at 06:08

## 2021-01-01 RX ADMIN — Medication 2 TABLET: at 09:42

## 2021-01-01 RX ADMIN — OXYCODONE HYDROCHLORIDE AND ACETAMINOPHEN 500 MG: 500 TABLET ORAL at 09:41

## 2021-01-01 RX ADMIN — CETIRIZINE HYDROCHLORIDE 10 MG: 10 TABLET, FILM COATED ORAL at 09:42

## 2021-01-01 RX ADMIN — EZETIMIBE 10 MG: 10 TABLET ORAL at 09:41

## 2021-01-01 RX ADMIN — ZINC SULFATE 220 MG (50 MG) CAPSULE 1 CAPSULE: CAPSULE at 12:17

## 2021-01-01 RX ADMIN — FUROSEMIDE 20 MG: 10 INJECTION, SOLUTION INTRAMUSCULAR; INTRAVENOUS at 12:17

## 2021-01-01 NOTE — DISCHARGE SUMMARY
[facility] [address] [place] [address]   Office [phone]  Fax [phone]       Discharge / Transfer / Off-Service Note     Name: Helio Grijalva MRN: [id number]  Sex: Female   YOB: 1950  Age: 65 y.o. PCP: Camila Campos MD     Date of admission: 12/31/2020  Date of discharge/transfer: 1/1/2021    Attending physician at admission: Carol Dos Santos MD  Attending physician at discharge/transfer: Carol Dos Santos MD   Resident physician at discharge/transfer: Ethan Ramesh MD     Consultants during hospitalization  None     Admission diagnoses   Acute respiratory failure with hypoxia ([facility] [address] [J96.01]  COVID-19 [U07.1]    Recommended follow-up after discharge  1. PCP     Things to follow up on with PCP:   - COVID pneumonia, shortness of breath      Medication Changes:  1. NEW MEDICATIONS: none, patient to complete doxycyline course (through 1/5/2021) and decadron (through 1/6/2021) from previous admission     2. 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 She was recently admitted to [facility] from 12/26/2020-12/29/2020 for COVID PNA. She is feeling better in general but does report continued dry cough and SOB, mostly with ambulation. She has been using a home pulse oximeter to monitor her O2 levels and reports that yesterday her O2 sats were in the 80s throughout the day. She called her PCP today when she noticed that her O2 sat was in the 70s, and her doctor recommended that she come to the ED to be evaluated.  Patient denies any fever, chills, CP, palpitations, appetite changes, abdominal pain, nausea, vomiting, diarrhea, urinary complaints, or dizziness.       Of note, RT performed home O2 evaluation in the ED and patient met criteria for home O2. Case management sent referrals to arrange for home O2 as patient did not want to be admitted to the hospital, however home O2 was not able to be arranged, so she is being admitted to the hospital.      COVID Questions:   Experiencing any of the following symptoms: fever, chills, cough, SOB, diarrhea, URI symptoms. yes  Any Sick contacts with fever, cough, diarrhea, SOB, URI symptoms. no  Traveled out of state or out of country. no  Lives with her . Has been staying at home. yes, other than recent hospitalization\"      Hospital course    Helio Grijalva was admitted into the Family Medicine Service from 12/31/2020 to 1/1/2021 for Acute respiratory failure with hypoxia ([facility] [address] [J96.01]  COVID-19 [U07.1]. During the course of this admission, the following conditions were addressed/managed; Acute Hypoxic Respiratory Failure 2/2 COVID PNA: Recently admitted from 12/26/2020-12/29/2020 for COVID PNA and discharged home after passing home O2 eval. Patient was discharged on Decadron and Doxycyline. Presented this time on 12/31 with hypoxia and met requirement for home O2. CXR on admission w/ diffuse patchy opacifications, worsened compared to prior study. Patient was treated with supplemental O2, doxy, decadron, and IV lasix x1. COVID labs down-trended during admission.  - Patient discharged with home supplemental O2 (3-4L with resting and 4L while walking)  - Continue Doxycycline 100 mg PO BID to complete 10 day course (through 1/5/2021)  - Continue Decadron to complete 10 day course (through 1/6/2021)     Hx of recent UTI: Present on previous admission. UCx grew klebsiella sensitive to CTX. Patient was treated with a 3 day course of IV ceftriaxone. Patients symptoms likely 2/2 vaginal atrophy. ZOLTAN UCx 12/29 negative.   - Continue topical replens gel, which patient was discharged on previously     HTN: Stable  - Continue home Metoprolol 25mg bid      HLD:   - Continue home Zetia 10mg daily and Gemfibrozil 600mg bid    Migraine Headaches: Stable. - Continue home Topamax 200mg bid and replax 40mg prn      Seasonal Allergies:   - Continue home Zyrtec 10 mg daily      Osteoporosis  - Continue home Fosamax 70mg weekly and vit D 2000U daily     Spinal stenosis: S/p fusion in 2009. Stable. Patient ambulates with cane.      Insomnia:   - Continue home Ambien 5 mg qhs.      Overweight: BMI 29.78 kg/m². - Encouraging lifestyle modifications and further follow up outpatient. Physical exam at discharge:    Vitals Reviewed. Patient Vitals for the past 12 hrs:   Temp Pulse Resp BP SpO2   01/01/21 1228 99.3 °F (37.4 °C) 70  104/70 98 %   01/01/21 1157 98.7 °F (37.1 °C) 67 20 118/76 95 %   01/01/21 0857 98 °F (36.7 °C) 91 20 (!) 144/62 (!) 89 %   01/01/21 0742  70      01/01/21 0430 98 °F (36.7 °C) 69 20 128/84 96 %      General Oriented to person, place, and time and well-developed. Appears well-nourished, no distress. Not diaphoretic. HENT Head Normocephalic and atraumatic. Eyes Conjunctivae are normal,    Neck No thyromegaly present. No cervical adenopathy. Cardio Normal rate, regular rhythm. Exam reveals no gallop and no friction rub. No murmur heard. No chest wall tenderness. Pulmonary Effort normal. No respiratory distress. No ronchi, wheezing    + mild crackles at bases b/l   Abdominal Soft. Bowel sounds normal. No distension. No tenderness.  Deferred. Extremities No edema of lower extremities. No tenderness. Distal pulses intact. Neurological Alert and oriented to person, place, and time. Dermatology Skin is warm and dry. No rash noted. No erythema or pallor. Psychiatric Affect and judgment normal.        Condition at discharge: Stable.     Labs  Recent Labs     01/01/21  0553 12/31/20  1359 12/29/20  1322   WBC 12.2* 12.2* 11.6*   HGB 12.5 12.0 12.1   HCT 39.3 37.0 36.6   * 446* 378     Recent Labs     01/01/21  0553 12/31/20  1359 12/31/20  1301    139 139   K 3.9 3.8 4.1 * 109* 110*   CO2 24 24 21   BUN 20 20 20   CREA 0.93 0.93 0.86   GLU 79 131* 129*   CA 9.6 9.5 9.3   MG  --   --  1.7     Recent Labs     12/31/20  1359 12/31/20  1301   ALT 21 21   AP 92 94   TBILI 0.3 0.5   TP 7.8 7.0   ALB 2.9* 2.9*   GLOB 4.9* 4.1*     Recent Labs     01/01/21  0553 12/31/20  1359 12/31/20  1301   TROIQ  --   --  <0.05   INR  --  1.2*  --    PTP  --  11.9*  --    APTT  --  27.1  --    FERR 604* 621*  --      Cultures  · None    Procedures / Diagnostic Studies  · None    Imaging  Results from Hospital Encounter encounter on 12/31/20   XR CHEST PORT    Narrative EXAM: XR CHEST PORT    HISTORY: covid. COMPARISON: 12/29/2020    FINDINGS: Single view(s) of the chest. Diffuse patchy opacities have progressed  throughout both lungs. No pleural effusion or pneumothorax. The  cardiomediastinal silhouette is unremarkable. The visualized osseous structures  are unremarkable. Impression IMPRESSION:  Diffuse patchy opacifications have progressed throughout both lungs. No results found for this or any previous visit. Results from [place] encounter on 12/26/20   CTA CHEST W OR W WO CONT    Narrative EXAM: CTA CHEST W OR W WO CONT    INDICATION: Hypoxia, dyspnea,  + Covid 10 days ago. History of PE    COMPARISON: None. CONTRAST: 100 mL of Isovue-370. TECHNIQUE:   Precontrast  images were obtained to localize the volume for acquisition. Multislice helical CT arteriography was performed from the diaphragm to the  thoracic inlet during uneventful rapid bolus intravenous contrast  administration. Lung and soft tissue windows were generated. Coronal and  sagittal images were generated and 3D post processing consisting of coronal  maximum intensity images was performed. CT dose reduction was achieved through  use of a standardized protocol tailored for this examination and automatic  exposure control for dose modulation.     FINDINGS:  Patchy diffuse bilateral airspace disease in all lobes. Cardiomegaly. The pulmonary arteries are well enhanced and no pulmonary emboli are identified. There is no mediastinal or hilar adenopathy or mass. The aorta enhances normally  without evidence of aneurysm or dissection. The visualized portions of the upper abdominal organs are remarkable for fatty  infiltration of the pancreas. Mild superior endplate L38 compression deformity. Impression IMPRESSION:     Multilobar pneumonia   No evidence for pulmonary embolism. Incidental cardiomegaly  Mild superior endplate Z77 compression deformity, age indeterminate            No procedure found.       Chronic diagnoses   Problem List as of 1/1/2021 Date Reviewed: 1/1/2021          Codes Class Noted - Resolved    Acute respiratory failure with hypoxia [facility]) ICD-10-CM: J96.01  ICD-9-CM: 518.81  12/31/2020 - Present        COVID-19 ICD-10-CM: U07.1  ICD-9-CM: 079.89  12/31/2020 - Present        UTI (urinary tract infection) ICD-10-CM: N39.0  ICD-9-CM: 599.0  12/27/2020 - Present        Migraine ICD-10-CM: N24.733  ICD-9-CM: 346.90  12/27/2020 - Present        Osteoporosis ICD-10-CM: M81.0  ICD-9-CM: 733.00  12/27/2020 - Present        Obesity ICD-10-CM: E66.9  ICD-9-CM: 278.00  12/27/2020 - Present        * (Principal) Acute hypoxemic respiratory failure due to COVID-19 [facility]) ICD-10-CM: U07.1, J96.01  ICD-9-CM: 518.81, 079.89, 799.02  12/26/2020 - Present        Insomnia ICD-10-CM: G47.00  ICD-9-CM: 780.52  12/6/2018 - Present        Hyponatremia ICD-10-CM: E87.1  ICD-9-CM: 276.1  6/9/2012 - Present        Hyperkalemia ICD-10-CM: E87.5  ICD-9-CM: 276.7  6/9/2012 - Present        Acidosis ICD-10-CM: E87.2  ICD-9-CM: 276.2  6/9/2012 - Present        Metabolic encephalopathy WWI-50-VY: G93.41  ICD-9-CM: 348.31  6/9/2012 - Present        HTN (hypertension) ICD-10-CM: I10  ICD-9-CM: 401.9  Unknown - Present        Hyperlipidemia ICD-10-CM: E78.5  ICD-9-CM: 272.4  Unknown - Present        Chronic pain ICD-10-CM: G89.29  ICD-9-CM: 338.29  Unknown - Present        Leukocytosis, unspecified ICD-10-CM: D72.829  ICD-9-CM: 288.60  10/11/2011 - Present        Anemia, unspecified ICD-10-CM: D64.9  ICD-9-CM: 285.9  10/11/2011 - Present        Dehydration ICD-10-CM: E86.0  ICD-9-CM: 276.51  10/11/2011 - Present        Renal failure, acute ([facility] [address] ICD-10-CM: N17.9  ICD-9-CM: 584.9  10/11/2011 - Present        Altered mental status ICD-10-CM: R41.82  ICD-9-CM: 780.97  10/11/2011 - Present        Stenosis of lumbosacral spine ICD-10-CM: M48.07  ICD-9-CM: 724.02  Unknown - Present              Discharge/Transfer Medications  Current Discharge Medication List      CONTINUE these medications which have NOT CHANGED    Details   doxycycline (ADOXA) 100 mg tablet Take 1 Tab by mouth two (2) times a day for 13 doses. First dose 12/30/20 in the morning, last dose 1/5/2021 in the morning. Qty: 13 Tab, Refills: 0      dexAMETHasone (DECADRON) 6 mg tablet Take once daily starting 12/30/20 for total of 7 doses. Qty: 7 Tab, Refills: 0      glycerin-min oil-polycarbophil (Replens) gel Use nightly. Topical use only. Put thin layer in vulvar area. Qty: 35 g, Refills: 0      zolpidem (AMBIEN) 5 mg tablet Take 5 mg by mouth nightly as needed for Sleep. alendronate (FOSAMAX) 70 mg tablet Take 1 Tab by mouth every seven (7) days. Qty: 13 Tab, Refills: 3    Associated Diagnoses: Osteoporosis, unspecified osteoporosis type, unspecified pathological fracture presence      ezetimibe (ZETIA) 10 mg tablet TAKE 1 TABLET BY MOUTH DAILY  Qty: 90 Tab, Refills: 1      gemfibroziL (LOPID) 600 mg tablet TAKE 1 TABLET BY MOUTH TWICE DAILY  Qty: 180 Tab, Refills: 1      metoprolol tartrate (LOPRESSOR) 25 mg tablet TAKE 1 TABLET BY MOUTH TWICE DAILY  Qty: 180 Tab, Refills: 1      REPATHA SURECLICK pen injection 896 mg Once every 2 weeks. Refills: 6      eletriptan (RELPAX) 40 mg tablet Take 40 mg by mouth once as needed.  may repeat in 2 hours if necessary      KRILL OIL PO Take 500 mg by mouth daily. multivitamin (MULTIPLE VITAMINS PO) Take 1 Tab by mouth daily. Lactobacillus acidophilus (ACIDOPHILUS PO) Take 1 Cap by mouth daily. topiramate (TOPAMAX) 200 mg tablet Take 200 mg by mouth two (2) times a day. cholecalciferol, vitamin D3, 2,000 unit tab Take 2,000 Units by mouth daily. turmeric (CURCUMIN) Take 500 mg by mouth daily. acetaminophen (TYLENOL EXTRA STRENGTH PO) Take 500 mg by mouth as needed. Takes 2 po as needed for leg or back pain. cetirizine (ZYRTEC) 10 mg tablet Take 10 mg by mouth daily. Diet:  Regular diet.     Activity:  As tolerated     Disposition: Home or Self Care    Discharge instructions to patient/family  Please seek medical attention for any new or worsening symptoms particularly fever, chest pain, shortness of breath, abdominal pain, nausea, vomiting    Follow up plans/appointments  Follow-up Information     Follow up With Specialties Details Why Contact Info    Barbara Puentes NP Nurse Practitioner Go on 1/4/2021 10am for hospital followup appt [address] [place] [address] [address]             Cristian Wright MD  Family Medicine Resident       For Billing    Chief Complaint   Patient presents with   [address] Problems  Date Reviewed: 1/1/2021          Codes Class Noted POA    Acute respiratory failure with hypoxia [facility]) ICD-10-CM: J96.01  ICD-9-CM: 518.81  12/31/2020 Unknown        COVID-19 ICD-10-CM: U07.1  ICD-9-CM: 079.89  12/31/2020 Unknown        Migraine ICD-10-CM: D81.088  ICD-9-CM: 346.90  12/27/2020 Yes        Osteoporosis ICD-10-CM: M81.0  ICD-9-CM: 733.00  12/27/2020 Yes        * (Principal) Acute hypoxemic respiratory failure due to COVID-19 [facility]) ICD-10-CM: U07.1, J96.01  ICD-9-CM: 518.81, 079.89, 799.02  12/26/2020 Yes        Insomnia ICD-10-CM: G47.00  ICD-9-CM: 780.52  12/6/2018 Yes HTN (hypertension) ICD-10-CM: I10  ICD-9-CM: 401.9  Unknown Yes        Hyperlipidemia ICD-10-CM: E78.5  ICD-9-CM: 272.4  Unknown Yes               [address] Medicine Residency Attending Addendum:  I saw and evaluated the patient on the day of the encounter with Dr. Olu Posey, performing the key elements of the service. I discussed the findings, assessment and plan with the resident and agree with the resident's findings and plan as documented in the resident's note.       Dennys Timmons MD, FAAFP, CAQSM, RMSK

## 2021-01-01 NOTE — PROGRESS NOTES
1/1/2021  12:39 PM  Case management note    Sent order and oximetry report to [employer], referral had been sent by [facility] yesterday and they accepted. Spoke with Lashawn Stephenson and  will be here soon to bring portable. notified family practice of time.   Patient is discharging today  [address]

## 2021-01-01 NOTE — ED NOTES
Report received from David, [facility]. Assumed care of pt at this time.

## 2021-01-01 NOTE — PROGRESS NOTES
Physical Therapy Note:    Orders acknowledged, chart reviewed, discussed with RN. PT evaluation deferred. Per reports pt up ad hollie without mobility difficulties or concerns and without skilled therapy needs. Home O2 assessment has been completed and pt preparing for discharge. Pt not appropriate for skilled PT services. Orders completed.      Fazal Frederick, PT, DPT, Lakeisha Akhtar

## 2021-01-01 NOTE — ED NOTES
TRANSFER - OUT REPORT:    Verbal report given to Thaddeus Tesfaye RN (name) on [address]ie [address]  being transferred to bed 431 (unit) for routine progression of care       Report consisted of patients Situation, Background, Assessment and   Recommendations(SBAR). Information from the following report(s) SBAR, ED Summary, [facility] and Recent Results was reviewed with the receiving nurse. Lines:   Peripheral IV 12/31/20 Right Arm (Active)   Site Assessment Clean, dry, & intact 12/31/20 1506   Phlebitis Assessment 0 12/31/20 1506   Dressing Type Tape;Transparent 12/31/20 1506   Hub Color/Line Status Pink;Flushed 12/31/20 1506   Action Taken Blood drawn 12/31/20 1506        Opportunity for questions and clarification was provided.       Patient transported with:  O2 @ 4 liter, Tech

## 2021-01-01 NOTE — PROGRESS NOTES
Occupational Therapy    Orders acknowledged and patient chart reviewed. RN reporting pt up ad hollie, no functional performance concerns. Patient currently preparing for discharge. No indication for acute OT evaluation at this time. Will sign-off. Please re-consult should patient remain in acute setting and experience change in status.       Rachel Yates, OTR/L

## 2021-01-01 NOTE — DISCHARGE INSTRUCTIONS
HOME DISCHARGE INSTRUCTIONS    Adán Evangelista / [id number] : 1950    Admission date: 2020 Discharge date: 2021     Please bring this form with you to show your care provider at your follow-up appointment. Primary care provider:  Antonieta Anguiano MD    Discharging provider:  Nancy Terry MD  - Family Medicine Resident  Sandra Saldivar MD - Attending, Family Medicine     You have been admitted to the hospital with the following diagnoses:    ACUTE DIAGNOSES:  Acute respiratory failure with hypoxia ([facility] [address] [J96.01]  COVID-19 [U07.1]  . . . . . . . . . . . . . . . . . . . . . . . . . . . . . . . . . . . . . . . . . . . . . . . . . . . . . . . . . . . . . . . . . . . . . . . Tamir Yates FOLLOW-UP CARE RECOMMENDATIONS:  - continue to monitor your oxygen saturation at home, if you are requiring increasing amounts of oxygen please call your PCP    MEDICATION CHANGES:   CONTINUE TAKING Decadron through  and Doxycycline through  as well as your other home medications     Appointments  Follow-up Information     Follow up With Specialties Details Why Contact Tatiana Ramirez NP Nurse Practitioner Go on 2021 10am for hospital followup appt [address] [facility]  [phone]             Follow-up tests needed: none    Pending test results: At the time of your discharge the following test results are still pending: none. Please make sure you review these results with your outpatient follow-up provider(s). Specific symptoms to watch for: chest pain, shortness of breath, fever, chills, nausea, vomiting, diarrhea, change in mentation, falling, weakness, bleeding. DIET/what to eat:  Regular Diet    ACTIVITY:  Activity as tolerated    Wound care: none     Equipment needed:  Home O2 equipment    What to do if new or unexpected symptoms occur?     If you experience any of the above symptoms (or should other concerns or questions arise after discharge) please call your primary care physician. Return to the emergency room if you cannot get hold of your doctor. · It is very important that you keep your follow-up appointment(s). · Please bring discharge papers, medication list (and/or medication bottles) to your follow-up appointments for review by your outpatient provider(s). · Please check the list of medications and be sure it includes every medication (even non-prescription medications) that your provider wants you to take. · It is important that you take the medication exactly as they are prescribed. · Keep your medication in the bottles provided by the pharmacist and keep a list of the medication names, dosages, and times to be taken in your wallet. · Do not take other medications without consulting your doctor. · If you have any questions about your medications or other instructions, please talk to your nurse or care provider before you leave the hospital.     Information obtained by:     I understand that if any problems occur once I am at home I am to contact my physician. These instructions were explained to me and I had the opportunity to ask questions. I understand and acknowledge receipt of the instructions indicated above.                                                                                                                                                Physician's or R.N.'s Signature                                                                  Date/Time                                                                                                                                              Patient or Representative Signature                                                          Date/Time

## 2021-01-02 ENCOUNTER — PATIENT OUTREACH (OUTPATIENT)
Dept: CASE MANAGEMENT | Age: 71
End: 2021-01-02

## 2021-01-02 LAB
ATRIAL RATE: 76 BPM
BACTERIA SPEC CULT: NORMAL
CALCULATED P AXIS, ECG09: 15 DEGREES
CALCULATED R AXIS, ECG10: -16 DEGREES
CALCULATED T AXIS, ECG11: -4 DEGREES
DIAGNOSIS, 93000: NORMAL
P-R INTERVAL, ECG05: 172 MS
Q-T INTERVAL, ECG07: 388 MS
QRS DURATION, ECG06: 92 MS
QTC CALCULATION (BEZET), ECG08: 436 MS
SERVICE CMNT-IMP: NORMAL
VENTRICULAR RATE, ECG03: 76 BPM

## 2021-01-02 NOTE — PROGRESS NOTES
Patient contacted regarding DUOLT-72 diagnosis\". Discussed COVID-19 related testing which was available at this time. Test results were positive. Patient informed of results, if available? yes     Care Transition Nurse/ Ambulatory Care Manager contacted the patient by telephone to perform post discharge assessment. Call within 2 business days of discharge: Yes Verified name and  with patient as identifiers. Provided introduction to self, and explanation of the CTN/ACM role, and reason for call due to risk factors for infection and/or exposure to COVID-19. Symptoms reviewed with patient who verbalized the following symptoms: no new symptoms and no worsening symptoms      Due to no new or worsening symptoms encounter was not routed to provider for escalation. Discussed follow-up appointments. If no appointment was previously scheduled, appointment scheduling offered:  [facility] follow up appointment(s):   Future Appointments   Date Time Provider Jhon Orona   2021 10:00 AM Loulou Peralta, NP CFM BS [facility]     Non-BS follow up appointment(s): none     Advance Care Planning:   Does patient have an Advance Directive:  decision maker updated. Patient has following risk factors of: pneumonia and chronic kidney disease. CTN/ACM reviewed discharge instructions, medical action plan and red flags such as increased shortness of breath, increasing fever and signs of decompensation with patient who verbalized understanding. Discussed exposure protocols and quarantine with CDC Guidelines What to do if you are sick with coronavirus disease .  Patient was given an opportunity for questions and concerns. The patient agrees to contact the [employer] exposure line [phone], [facility] department [facility] [address]  [phone] and PCP office for questions related to their healthcare. CTN/ACM provided contact information for future needs.     Reviewed and educated patient on any new and changed medications related to discharge diagnosis     Patient/family/caregiver given information for [employer] Loop and agrees to enroll no  Patient's preferred e-mail: n/a   Patient's preferred phone number: n/a  Based on Loop alert triggers, patient will be contacted by nurse care manager for worsening symptoms. Plan for follow-up call in 5-7 days based on severity of symptoms and risk factors.

## 2021-01-04 ENCOUNTER — VIRTUAL VISIT (OUTPATIENT)
Dept: FAMILY MEDICINE CLINIC | Age: 71
End: 2021-01-04
Payer: MEDICARE

## 2021-01-04 DIAGNOSIS — U07.1 COVID-19: Primary | ICD-10-CM

## 2021-01-04 DIAGNOSIS — J12.82 PNEUMONIA DUE TO COVID-19 VIRUS: ICD-10-CM

## 2021-01-04 DIAGNOSIS — U07.1 PNEUMONIA DUE TO COVID-19 VIRUS: ICD-10-CM

## 2021-01-04 PROCEDURE — 99442 PR PHYS/QHP TELEPHONE EVALUATION 11-20 MIN: CPT | Performed by: NURSE PRACTITIONER

## 2021-01-04 NOTE — PROGRESS NOTES
Chase Foster is a 65 y.o. female, evaluated via audio-only technology on 1/4/2021 for No chief complaint on file. .    Assessment & Plan:   Diagnoses and all orders for this visit:    1. COVID-19    2. Pneumonia due to COVID-19 virus    -improving, advised to stay on oxygen, monitor O2 sats daily, if she does not continue to improve will need to follow up. Finish all medications and plan for follow up apt in 7-10 days. 12  Subjective:   Phone call only today. Positive for COVID 19. Went in to hospital 12/26 for SOB and then had to back 12/31 for SOB and was sent home on home oxygen on 4 liters. O2 sats are 93 on oxygen and 85% off oxygen  Sent home on dexamethasone and doxycycline. States she is doing better. Eating well and gaining more activity. Home vitals given and stable. Prior to Admission medications    Medication Sig Start Date End Date Taking? Authorizing Provider   doxycycline (ADOXA) 100 mg tablet Take 1 Tab by mouth two (2) times a day for 13 doses. First dose 12/30/20 in the morning, last dose 1/5/2021 in the morning. 12/30/20 1/6/21 Yes Lillie Brown MD   dexAMETHasone (DECADRON) 6 mg tablet Take once daily starting 12/30/20 for total of 7 doses. 12/30/20  Yes Lillie Brown MD   glycerin-min oil-polycarbophil (Replens) gel Use nightly. Topical use only. Put thin layer in vulvar area. 12/29/20  Yes Lillie Brown MD   zolpidem (AMBIEN) 5 mg tablet Take 5 mg by mouth nightly as needed for Sleep. Yes Provider, Historical   alendronate (FOSAMAX) 70 mg tablet Take 1 Tab by mouth every seven (7) days.  12/8/20  Yes Alfred Easley MD   ezetimibe (ZETIA) 10 mg tablet TAKE 1 TABLET BY MOUTH DAILY 9/30/20  Yes Alfred Easley MD   gemfibroziL (LOPID) 600 mg tablet TAKE 1 TABLET BY MOUTH TWICE DAILY 9/30/20  Yes Alfred Easley MD   metoprolol tartrate (LOPRESSOR) 25 mg tablet TAKE 1 TABLET BY MOUTH TWICE DAILY 9/1/20  Yes Alfred Easley MD REPATHA SURECLICK pen injection 433 mg Once every 2 weeks. 10/30/19  Yes Provider, Historical   eletriptan (RELPAX) 40 mg tablet Take 40 mg by mouth once as needed. may repeat in 2 hours if necessary   Yes Provider, Historical   KRILL OIL PO Take 500 mg by mouth daily. Yes Provider, Historical   multivitamin (MULTIPLE VITAMINS PO) Take 1 Tab by mouth daily. Yes Provider, Historical   Lactobacillus acidophilus (ACIDOPHILUS PO) Take 1 Cap by mouth daily. Yes Provider, Historical   topiramate (TOPAMAX) 200 mg tablet Take 200 mg by mouth two (2) times a day. Yes Provider, Historical   cholecalciferol, vitamin D3, 2,000 unit tab Take 2,000 Units by mouth daily. Yes Provider, Historical   turmeric (CURCUMIN) Take 500 mg by mouth daily. Yes Provider, Historical   acetaminophen (TYLENOL EXTRA STRENGTH PO) Take 500 mg by mouth as needed. Takes 2 po as needed for leg or back pain. Yes Provider, Historical   cetirizine (ZYRTEC) 10 mg tablet Take 10 mg by mouth daily. Yes Provider, Historical     Patient Active Problem List   Diagnosis Code    HTN (hypertension) I10    Hyperlipidemia E78.5    Chronic pain G89.29    Leukocytosis, unspecified D72.829    Anemia, unspecified D64.9    Dehydration E86.0    Renal failure, acute (HCC) N17.9    Altered mental status R41.82    Stenosis of lumbosacral spine M48.07    Hyponatremia E87.1    Hyperkalemia E87.5    Acidosis K77.4    Metabolic encephalopathy I39.88    Insomnia G47.00    Acute hypoxemic respiratory failure due to COVID-19 (HCC) U07.1, J96.01    UTI (urinary tract infection) N39.0    Migraine G43.909    Osteoporosis M81.0    Obesity E66.9    Acute respiratory failure with hypoxia (HCC) J96.01    COVID-19 U07.1       Review of Systems   Constitutional: Positive for weight loss. Negative for chills, fever and malaise/fatigue. HENT: Negative for congestion and sore throat. Eyes: Negative for blurred vision.    Respiratory: Positive for shortness of breath. Negative for cough and wheezing. Cardiovascular: Negative for chest pain, palpitations and leg swelling. Neurological: Negative for dizziness and headaches. Patient-Reported Vitals 1/4/2021   Patient-Reported Pulse 90   Patient-Reported Temperature 98   Patient-Reported SpO2 93   Patient-Reported Systolic  135   Patient-Reported Diastolic 77        Courtney Khan, who was evaluated through a patient-initiated, synchronous (real-time) audio only encounter, and/or her healthcare decision maker, is aware that it is a billable service, with coverage as determined by her insurance carrier. She provided verbal consent to proceed: Yes. She has not had a related appointment within my department in the past 7 days or scheduled within the next 24 hours.   Patient and provider at individual homes      Total Time: minutes: 11-20 minutes    Aleksandra Patton NP

## 2021-01-07 ENCOUNTER — HOSPITAL ENCOUNTER (EMERGENCY)
Age: 71
Discharge: HOME | End: 2021-01-07
Attending: EMERGENCY MEDICINE
Payer: MEDICARE

## 2021-01-07 ENCOUNTER — APPOINTMENT (OUTPATIENT)
Dept: GENERAL RADIOLOGY | Age: 71
End: 2021-01-07
Attending: EMERGENCY MEDICINE
Payer: MEDICARE

## 2021-01-07 VITALS
RESPIRATION RATE: 20 BRPM | DIASTOLIC BLOOD PRESSURE: 90 MMHG | OXYGEN SATURATION: 92 % | SYSTOLIC BLOOD PRESSURE: 151 MMHG | TEMPERATURE: 98 F | HEART RATE: 96 BPM

## 2021-01-07 DIAGNOSIS — R06.02 SOB (SHORTNESS OF BREATH): ICD-10-CM

## 2021-01-07 DIAGNOSIS — R09.02 HYPOXIA: Primary | ICD-10-CM

## 2021-01-07 LAB
ALBUMIN SERPL-MCNC: 3.5 G/DL (ref 3.5–5)
ALBUMIN/GLOB SERPL: 0.6 {RATIO} (ref 1.1–2.2)
ALP SERPL-CCNC: 110 U/L (ref 45–117)
ALT SERPL-CCNC: 23 U/L (ref 12–78)
ANION GAP SERPL CALC-SCNC: 5 MMOL/L (ref 5–15)
APTT PPP: 27.9 SEC (ref 22.1–31)
AST SERPL-CCNC: 20 U/L (ref 15–37)
BASOPHILS # BLD: 0.1 K/UL (ref 0–0.1)
BASOPHILS NFR BLD: 1 % (ref 0–1)
BILIRUB SERPL-MCNC: 0.4 MG/DL (ref 0.2–1)
BUN SERPL-MCNC: 26 MG/DL (ref 6–20)
BUN/CREAT SERPL: 24 (ref 12–20)
CALCIUM SERPL-MCNC: 10.5 MG/DL (ref 8.5–10.1)
CHLORIDE SERPL-SCNC: 105 MMOL/L (ref 97–108)
CO2 SERPL-SCNC: 27 MMOL/L (ref 21–32)
COVID-19 RAPID TEST, COVR: NOT DETECTED
CREAT SERPL-MCNC: 1.1 MG/DL (ref 0.55–1.02)
D DIMER PPP FEU-MCNC: 1.2 MG/L FEU (ref 0–0.65)
DIFFERENTIAL METHOD BLD: ABNORMAL
EOSINOPHIL # BLD: 0.3 K/UL (ref 0–0.4)
EOSINOPHIL NFR BLD: 2 % (ref 0–7)
ERYTHROCYTE [DISTWIDTH] IN BLOOD BY AUTOMATED COUNT: 14.5 % (ref 11.5–14.5)
FIBRINOGEN PPP-MCNC: 764 MG/DL (ref 200–475)
GLOBULIN SER CALC-MCNC: 5.4 G/DL (ref 2–4)
GLUCOSE SERPL-MCNC: 119 MG/DL (ref 65–100)
HCT VFR BLD AUTO: 42.2 % (ref 35–47)
HGB BLD-MCNC: 13.4 G/DL (ref 11.5–16)
IMM GRANULOCYTES # BLD AUTO: 0.2 K/UL (ref 0–0.04)
IMM GRANULOCYTES NFR BLD AUTO: 1 % (ref 0–0.5)
INR PPP: 1.2 (ref 0.9–1.1)
LACTATE SERPL-SCNC: 1.6 MMOL/L (ref 0.4–2)
LYMPHOCYTES # BLD: 4.8 K/UL (ref 0.8–3.5)
LYMPHOCYTES NFR BLD: 30 % (ref 12–49)
MAGNESIUM SERPL-MCNC: 1.8 MG/DL (ref 1.6–2.4)
MCH RBC QN AUTO: 28.6 PG (ref 26–34)
MCHC RBC AUTO-ENTMCNC: 31.8 G/DL (ref 30–36.5)
MCV RBC AUTO: 90 FL (ref 80–99)
MONOCYTES # BLD: 0.9 K/UL (ref 0–1)
MONOCYTES NFR BLD: 6 % (ref 5–13)
NEUTS SEG # BLD: 10 K/UL (ref 1.8–8)
NEUTS SEG NFR BLD: 60 % (ref 32–75)
NRBC # BLD: 0 K/UL (ref 0–0.01)
NRBC BLD-RTO: 0 PER 100 WBC
PLATELET # BLD AUTO: 550 K/UL (ref 150–400)
PMV BLD AUTO: 10.8 FL (ref 8.9–12.9)
POTASSIUM SERPL-SCNC: 3.7 MMOL/L (ref 3.5–5.1)
PROCALCITONIN SERPL-MCNC: <0.05 NG/ML
PROT SERPL-MCNC: 8.9 G/DL (ref 6.4–8.2)
PROTHROMBIN TIME: 12.2 SEC (ref 9–11.1)
RBC # BLD AUTO: 4.69 M/UL (ref 3.8–5.2)
SODIUM SERPL-SCNC: 137 MMOL/L (ref 136–145)
SOURCE, COVRS: NORMAL
SPECIMEN SOURCE, FCOV2M: NORMAL
SPECIMEN TYPE, XMCV1T: NORMAL
THERAPEUTIC RANGE,PTTT: NORMAL SECS (ref 58–77)
TROPONIN I SERPL-MCNC: <0.05 NG/ML
WBC # BLD AUTO: 16.3 K/UL (ref 3.6–11)

## 2021-01-07 PROCEDURE — 80053 COMPREHEN METABOLIC PANEL: CPT

## 2021-01-07 PROCEDURE — 85379 FIBRIN DEGRADATION QUANT: CPT

## 2021-01-07 PROCEDURE — 83735 ASSAY OF MAGNESIUM: CPT

## 2021-01-07 PROCEDURE — 99283 EMERGENCY DEPT VISIT LOW MDM: CPT

## 2021-01-07 PROCEDURE — 36415 COLL VENOUS BLD VENIPUNCTURE: CPT

## 2021-01-07 PROCEDURE — 85384 FIBRINOGEN ACTIVITY: CPT

## 2021-01-07 PROCEDURE — 85730 THROMBOPLASTIN TIME PARTIAL: CPT

## 2021-01-07 PROCEDURE — 87635 SARS-COV-2 COVID-19 AMP PRB: CPT

## 2021-01-07 PROCEDURE — 85025 COMPLETE CBC W/AUTO DIFF WBC: CPT

## 2021-01-07 PROCEDURE — 71045 X-RAY EXAM CHEST 1 VIEW: CPT

## 2021-01-07 PROCEDURE — 84145 PROCALCITONIN (PCT): CPT

## 2021-01-07 PROCEDURE — 83605 ASSAY OF LACTIC ACID: CPT

## 2021-01-07 PROCEDURE — 93005 ELECTROCARDIOGRAM TRACING: CPT

## 2021-01-07 PROCEDURE — 87040 BLOOD CULTURE FOR BACTERIA: CPT

## 2021-01-07 PROCEDURE — 84484 ASSAY OF TROPONIN QUANT: CPT

## 2021-01-07 PROCEDURE — 85610 PROTHROMBIN TIME: CPT

## 2021-01-07 RX ORDER — DOXYCYCLINE 100 MG/1
100 TABLET ORAL 2 TIMES DAILY
Qty: 7 TAB | Refills: 0 | Status: SHIPPED | OUTPATIENT
Start: 2021-01-07 | End: 2021-01-11

## 2021-01-07 NOTE — ED PROVIDER NOTES
Pt had seen today at [facility] for increased difficulty breathing. Chest pain all over. No fever at home. No vomiting or diarrhea. Low O2 sat home down to 70's off oxygen. Pt had severe pneumonia early in December. Admitted to [address] [place] [address]. Subsequently started on home O2 after that admission. Home from hospital for past 5 days. Pt now requiring O2 around the clock. [employer] concerned patient has recurrence of pneumonia along with elevated d-dimer. Had rapid COVID test at [facility] today which was neg.            Past Medical History:   Diagnosis Date    Aneurysm [facility])     splenic artery aneurysm - no longer needs to be followed up - will never be a problem    Arthritis     back    Chronic pain     back    CKD (chronic kidney disease) stage 2, GFR 60-89 ml/min     COVID-19     History of vascular access device 12/28/2020    4 Fr midilne, 10 cm L basilic, poor access    Hyperlipidemia     Hypertension     Ill-defined condition     walks with a cane    Ill-defined condition     cardiac calcium test - some build up/stress test is \"fine\" per pt    Lumbar stenosis     Migraine     Other ill-defined conditions(799.89)     wheezing with allergies    Other ill-defined conditions(799.89)     GI bleed - stomach - NSAID use    Overactive bladder     PUD (peptic ulcer disease)     Stenosis of lumbosacral spine     Thromboembolus ([address]     PE       Past Surgical History:   Procedure Laterality Date    COLONOSCOPY N/A 10/1/2018    COLONOSCOPY performed by Jaret [facility]kee [facility] at [facility] ENDOSCOPY    HX HYSTERECTOMY      HX ORTHOPAEDIC  2009    spinal fusion/has not had a lumbar laminectomy    HX ORTHOPAEDIC Bilateral     bunionectomy - bilateral once and unilateral once    HX ORTHOPAEDIC Bilateral     carpal tunnel release    HX TONSILLECTOMY      T & A    HX UROLOGICAL  2012    bladder repair         Family History:   Problem Relation Age of Onset    Cancer Father         bladder    Cancer Brother         BCCA    Stroke Mother     Cancer Maternal Uncle         leukemia    Breast Cancer Paternal Aunt     Stroke Maternal Grandmother     Colon Cancer Paternal Grandfather     Cancer Maternal Uncle         throat       Social History     Socioeconomic History    Marital status:      Spouse name: Not on file    Number of children: Not on file    Years of education: Not on file    Highest education level: Not on file   Occupational History    Not on file   Social Needs    Financial resource strain: Not on file    Food insecurity     Worry: Not on file     Inability: Not on file   [employer] needs     Medical: Not on file     Non-medical: Not on file   Tobacco Use    Smoking status: Never Smoker    Smokeless tobacco: Never Used   Substance and Sexual Activity    Alcohol use: Yes     Comment: very, very rare    Drug use: No    Sexual activity: Yes     Partners: Male   Lifestyle    Physical activity     Days per week: Not on file     Minutes per session: Not on file    Stress: Not on file   Relationships    Social connections     Talks on phone: Not on file     Gets together: Not on file     Attends [employer] service: Not on file     Active member of club or organization: Not on file     Attends meetings of clubs or organizations: Not on file     Relationship status: Not on file    Intimate partner violence     Fear of current or ex partner: Not on file     Emotionally abused: Not on file     Physically abused: Not on file     Forced sexual activity: Not on file   Other Topics Concern     Service Not Asked    Blood Transfusions Not Asked    Caffeine Concern Not Asked    Occupational Exposure Not Asked   Xiomy [employer] Hazards Not Asked    Sleep Concern Not Asked    Stress Concern Not Asked    Weight Concern Not Asked    Special Diet Not Asked    Back Care Not Asked    Exercise Not Asked    Bike Helmet Not Asked   [address] Not Asked    Self-Exams Not Asked Social History Narrative    Not on file         ALLERGIES: Nitrofurantoin; Nsaids (non-steroidal anti-inflammatory drug); Other plant, animal, environmental; Statins-hmg-coa reductase inhibitors; Tolmetin; and Toradol [ketorolac tromethamine]    Review of Systems   Constitutional: Negative for fever. HENT: Negative for facial swelling. Eyes: Negative for visual disturbance. Respiratory: Positive for chest tightness and shortness of breath. Cardiovascular: Negative for chest pain. Gastrointestinal: Negative for abdominal pain. Genitourinary: Negative for difficulty urinating and dysuria. Musculoskeletal: Negative for arthralgias. Skin: Negative for rash. Neurological: Negative for headaches. Hematological: Negative for adenopathy. Psychiatric/Behavioral: Negative for suicidal ideas. Vitals:    01/07/21 1308   BP: (!) 151/90   Pulse: 96   Resp: 20   Temp: 98 °F (36.7 °C)   SpO2: 92%            Physical Exam  Vitals signs and nursing note reviewed. Constitutional:       General: She is not in acute distress. Appearance: She is well-developed. HENT:      Head: Normocephalic and atraumatic. Eyes:      General: No scleral icterus. Conjunctiva/sclera: Conjunctivae normal.      Pupils: Pupils are equal, round, and reactive to light. Neck:      Musculoskeletal: Normal range of motion and neck supple. Cardiovascular:      Rate and Rhythm: Normal rate. Heart sounds: No murmur. Pulmonary:      Effort: Pulmonary effort is normal. No respiratory distress. Abdominal:      General: There is no distension. Musculoskeletal: Normal range of motion. Skin:     General: Skin is warm and dry. Findings: No rash. Neurological:      Mental Status: She is alert and oriented to person, place, and time.           MDM  Number of Diagnoses or Management Options  Hypoxia  SOB (shortness of breath)  Diagnosis management comments: Assessment: Patient here with concerns and anxiety about hypoxia and her Covid infection. Blood work is stable with a slightly increased white count is most likely due to to her recent course of steroids. She has been using home oxygen as directed. Her chest x-ray appears unchanged from previous. I think the patient is stable at this time and does not require admission to the hospital.  We discussed this for a long time she is very anxious about her breathing and if it will get better. I explained to her that she is still healing from her coronavirus section and it could be many weeks of weaning her oxygen. I do not see any indication for her to be admitted again. Patient is okay with this plan and will be discharged home to continue with her home oxygen. \"We will put her on an additional course of doxycycline she can follow-up with the primary care doctors. Return to the ED with any new concerns.        Amount and/or Complexity of Data Reviewed  Clinical lab tests: reviewed  Tests in the radiology section of CPT®: reviewed           Procedures

## 2021-01-07 NOTE — ED TRIAGE NOTES
Pt stated she has Covid +, pt here for pneumonia again, pt wears oxygen at 4 liters at home, denies fever or chills, +sob, denies diarrhea, was discharged from [facility], pt had lab work and cxr today that shows pneumonia and probably blood clots too

## 2021-01-08 ENCOUNTER — PATIENT OUTREACH (OUTPATIENT)
Dept: CASE MANAGEMENT | Age: 71
End: 2021-01-08

## 2021-01-08 LAB
ATRIAL RATE: 96 BPM
CALCULATED P AXIS, ECG09: 37 DEGREES
CALCULATED R AXIS, ECG10: -20 DEGREES
CALCULATED T AXIS, ECG11: 49 DEGREES
DIAGNOSIS, 93000: NORMAL
P-R INTERVAL, ECG05: 166 MS
Q-T INTERVAL, ECG07: 350 MS
QRS DURATION, ECG06: 80 MS
QTC CALCULATION (BEZET), ECG08: 442 MS
VENTRICULAR RATE, ECG03: 96 BPM

## 2021-01-08 NOTE — ACP (ADVANCE CARE PLANNING)
Advance Care Planning:   Does patient have an Advance Directive:  pt stated she has an AMD and will make sure a copy gets in her chart.       Primary Decision Maker: Venessa Bernard - Son - [phone]    Secondary Decision Maker: Sweta Durham - Child - [phone]

## 2021-01-08 NOTE — PROGRESS NOTES
Patient contacted regarding HPBGX-55 diagnosis\". [facility] ED 21 dx-hypoxia, sob (positive for covid-19) Discussed COVID-19 related testing which was available at this time. Test results were negative. Patient informed of results, if available? yes Pt stated she tested positive for covid on 20 at [facility]. Pt stated she is doing fair. Her life partner tested positive for covid this morning at the [place]. Pt is taking the antibiotic, has finished the prednisone and is wearing oxygen at 4 LPM. Asked pt to check her saturation during phone call and it was 94%. Pt stated her PCP does not need to see her at this time. New [employer] form was mailed to pt. Care Transition Nurse/ Ambulatory Care Manager contacted the patient by telephone to perform post discharge assessment. Call within 2 business days of discharge: Yes Verified name and  with patient as identifiers. Provided introduction to self, and explanation of the CTN/ACM role, and reason for call due to risk factors for infection and/or exposure to COVID-19. Symptoms reviewed with patient who verbalized the following symptoms: shortness of breath, no new symptoms, no worsening symptoms and runny nose; wearing oxygen at 4 LPM      Due to no new or worsening symptoms encounter was not routed to provider for escalation. Discussed follow-up appointments. If no appointment was previously scheduled, appointment scheduling offered:  yes   [address] follow up appointment(s): No future appointments. Non-[facility] follow up appointment(s): n/a     Advance Care Planning:   Does patient have an Advance Directive:  pt stated she has an AMD and will make sure a copy gets in her chart. .     Patient has following risk factors of: older adult, HTN. CTN/ACM reviewed discharge instructions, medical action plan and red flags such as increased shortness of breath, increasing fever and signs of decompensation with patient who verbalized understanding.    Discussed exposure protocols and quarantine with CDC Guidelines What to do if you are sick with coronavirus disease 2019.  Patient was given an opportunity for questions and concerns. The patient agrees to contact the [facility] exposure line [phone], local health department n/a and PCP office for questions related to their healthcare. CTN/ACM provided contact information for future needs. Reviewed and educated patient on any new and changed medications related to discharge diagnosis     Patient/family/caregiver given information for [facility] and agrees to enroll no      Plan for follow-up call in 5-7 days based on severity of symptoms and risk factors.

## 2021-01-12 LAB
BACTERIA SPEC CULT: NORMAL
SERVICE CMNT-IMP: NORMAL

## 2021-01-15 ENCOUNTER — PATIENT OUTREACH (OUTPATIENT)
Dept: CASE MANAGEMENT | Age: 71
End: 2021-01-15

## 2021-01-15 NOTE — PROGRESS NOTES
Left pt a message inquiring how she feeling to follow up from initial outreach on 1/8/21 regarding [facility] ED 1/7/21 dx-hypoxia, sob (positive for covid-19).  Pt tested positive for covid-19 at Urgent Care the middle of December.

## 2021-01-22 ENCOUNTER — PATIENT OUTREACH (OUTPATIENT)
Dept: CASE MANAGEMENT | Age: 71
End: 2021-01-22

## 2021-01-22 NOTE — PROGRESS NOTES
Patient resolved from Transition of Care episode on 1/22/21-f/u-[facility] ED 1/7/21 dx-hypoxia, sob (positive for covid-19). ACM/CTN was unsuccessful at contacting this patient today. Patient/family was provided the following resources and education related to COVID-19 during the initial call:                         Signs, symptoms and red flags related to COVID-19            CDC exposure and quarantine guidelines            [facility] exposure contact - [phone]            Contact for their local Department of Health                 Patient has not had any additional ED or hospital visits. No further outreach scheduled with this CTN/ACM. Episode of Care resolved. Patient has this CTN/ACM contact information if future needs arise.

## 2021-01-29 ENCOUNTER — TELEPHONE (OUTPATIENT)
Dept: FAMILY MEDICINE CLINIC | Age: 71
End: 2021-01-29

## 2021-01-29 NOTE — TELEPHONE ENCOUNTER
Pt seen in ED      Shannen with [facility] calling, states she received Oxygen CMN signed by Dr. Joan Guajardo. She states you need to add length and also dx code in fax back.     FAX  Back to [phone]     [phone]

## 2021-02-11 ENCOUNTER — VIRTUAL VISIT (OUTPATIENT)
Dept: FAMILY MEDICINE CLINIC | Age: 71
End: 2021-02-11
Payer: MEDICARE

## 2021-02-11 DIAGNOSIS — U07.1 COVID-19: Primary | ICD-10-CM

## 2021-02-11 DIAGNOSIS — G47.00 INSOMNIA, UNSPECIFIED TYPE: ICD-10-CM

## 2021-02-11 PROCEDURE — 99443 PR PHYS/QHP TELEPHONE EVALUATION 21-30 MIN: CPT | Performed by: FAMILY MEDICINE

## 2021-02-11 RX ORDER — ZOLPIDEM TARTRATE 5 MG/1
5 TABLET ORAL
Qty: 30 TAB | Refills: 2 | Status: SHIPPED | OUTPATIENT
Start: 2021-02-11 | End: 2021-05-12 | Stop reason: SDUPTHER

## 2021-02-11 NOTE — PROGRESS NOTES
Alexsandra Lauren is a 65 y.o. female      Chief Complaint   Patient presents with    Medication Refill     Zolpidem   [facility] Follow Up     Follow-up visit from December ( Jose)          1. Have you been to the ER, urgent care clinic since your last visit? Hospitalized since your last visit? Yes Low O2 and SOB  1/2021       2. Have you seen or consulted any other health care providers outside of the [address] [place] [address] since your last visit? Include any pap smears or colon screening.   no

## 2021-02-11 NOTE — PROGRESS NOTES
Jose Vargas is a 65 y.o. female evaluated via telephone on 2/11/2021. Consent:  She and/or health care decision maker is aware that she may receive a bill for this telephone service, depending on her insurance coverage, and has provided verbal consent to proceed: Yes      Documentation:  I communicated with the patient and/or health care decision maker about COVID pneumonia. Details of this discussion including any medical advice provided:     COVID pneumonia  DC 1/1/2021  Had to go back and then came home on O2  Today pulse ox 95%  Still uses O2 to take walks and uses at night  Saw Pulmonary Hannah CASTRO. No need for CXR at that time. Sees them again in February and having chest xray then. Steadily improving. Walks in the house several times per day  Several loops around the house. Doing other exercises  Some dyspnea. Off of Repatha  Previously no heart attack,   Wants refill of zolpidem  Using albuterol PRN for breathing    Assessment and Plan:    1. COVID-19  FU with pulmonary  Discussed COVID vaccine. She should have immunity after her own illness. I have told her to see Pulmonary before she gets vaccine and ask if any issues given her ongoing lung inflammation. 2. Insomnia, unspecified type  Refill. Use sparingly to avoid suppressing respiration. - zolpidem (AMBIEN) 5 mg tablet; Take 1 Tab by mouth nightly as needed for Sleep. Max Daily Amount: 5 mg. Dispense: 30 Tab; Refill: 2      Follow-up and Dispositions    · Return in about 3 months (around 5/11/2021) for Medication follow up. I affirm this is a Patient Initiated Episode with a Patient who has not had a related appointment within my department in the past 7 days or scheduled within the next 24 hours.     Total Time: minutes: 21-30 minutes    Note: not billable if this call serves to triage the patient into an appointment for the relevant concern    The patient was at home and I was in office for this phone visit.     Gibson Foster MD

## 2021-02-16 ENCOUNTER — TRANSCRIBE ORDER (OUTPATIENT)
Dept: GENERAL RADIOLOGY | Age: 71
End: 2021-02-16

## 2021-02-16 ENCOUNTER — HOSPITAL ENCOUNTER (OUTPATIENT)
Dept: GENERAL RADIOLOGY | Age: 71
Discharge: HOME | End: 2021-02-16
Attending: PHYSICIAN ASSISTANT
Payer: MEDICARE

## 2021-02-16 DIAGNOSIS — U07.1 COVID-19: Primary | ICD-10-CM

## 2021-02-16 DIAGNOSIS — U07.1 COVID-19: ICD-10-CM

## 2021-02-16 PROCEDURE — 71046 X-RAY EXAM CHEST 2 VIEWS: CPT

## 2021-03-05 RX ORDER — GEMFIBROZIL 600 MG/1
TABLET, FILM COATED ORAL
Qty: 180 TAB | Refills: 1 | Status: SHIPPED | OUTPATIENT
Start: 2021-03-05 | End: 2021-09-20

## 2021-03-05 RX ORDER — METOPROLOL TARTRATE 25 MG/1
TABLET, FILM COATED ORAL
Qty: 180 TAB | Refills: 1 | Status: SHIPPED | OUTPATIENT
Start: 2021-03-05 | End: 2021-09-20

## 2021-03-26 ENCOUNTER — TRANSCRIBE ORDER (OUTPATIENT)
Dept: GENERAL RADIOLOGY | Age: 71
End: 2021-03-26

## 2021-03-26 ENCOUNTER — HOSPITAL ENCOUNTER (OUTPATIENT)
Dept: GENERAL RADIOLOGY | Age: 71
Discharge: HOME | End: 2021-03-26
Attending: PHYSICIAN ASSISTANT
Payer: MEDICARE

## 2021-03-26 DIAGNOSIS — U07.1 COVID-19: Primary | ICD-10-CM

## 2021-03-26 DIAGNOSIS — U07.1 COVID-19: ICD-10-CM

## 2021-03-26 PROCEDURE — 71046 X-RAY EXAM CHEST 2 VIEWS: CPT

## 2021-04-12 RX ORDER — EZETIMIBE 10 MG/1
TABLET ORAL
Qty: 90 TAB | Refills: 1 | Status: SHIPPED | OUTPATIENT
Start: 2021-04-12 | End: 2021-11-03

## 2021-05-12 ENCOUNTER — OFFICE VISIT (OUTPATIENT)
Dept: FAMILY MEDICINE CLINIC | Age: 71
End: 2021-05-12
Payer: MEDICARE

## 2021-05-12 VITALS
HEART RATE: 72 BPM | WEIGHT: 161.2 LBS | SYSTOLIC BLOOD PRESSURE: 129 MMHG | RESPIRATION RATE: 16 BRPM | TEMPERATURE: 98.2 F | DIASTOLIC BLOOD PRESSURE: 74 MMHG | HEIGHT: 61 IN | BODY MASS INDEX: 30.43 KG/M2 | OXYGEN SATURATION: 98 %

## 2021-05-12 DIAGNOSIS — N39.0 RECURRENT UTI: ICD-10-CM

## 2021-05-12 DIAGNOSIS — G47.00 INSOMNIA, UNSPECIFIED TYPE: ICD-10-CM

## 2021-05-12 DIAGNOSIS — R35.0 URINARY FREQUENCY: Primary | ICD-10-CM

## 2021-05-12 PROCEDURE — G8536 NO DOC ELDER MAL SCRN: HCPCS | Performed by: NURSE PRACTITIONER

## 2021-05-12 PROCEDURE — G8432 DEP SCR NOT DOC, RNG: HCPCS | Performed by: NURSE PRACTITIONER

## 2021-05-12 PROCEDURE — G8417 CALC BMI ABV UP PARAM F/U: HCPCS | Performed by: NURSE PRACTITIONER

## 2021-05-12 PROCEDURE — 99213 OFFICE O/P EST LOW 20 MIN: CPT | Performed by: NURSE PRACTITIONER

## 2021-05-12 PROCEDURE — 1090F PRES/ABSN URINE INCON ASSESS: CPT | Performed by: NURSE PRACTITIONER

## 2021-05-12 PROCEDURE — 1101F PT FALLS ASSESS-DOCD LE1/YR: CPT | Performed by: NURSE PRACTITIONER

## 2021-05-12 PROCEDURE — G8752 SYS BP LESS 140: HCPCS | Performed by: NURSE PRACTITIONER

## 2021-05-12 PROCEDURE — 3017F COLORECTAL CA SCREEN DOC REV: CPT | Performed by: NURSE PRACTITIONER

## 2021-05-12 PROCEDURE — G9899 SCRN MAM PERF RSLTS DOC: HCPCS | Performed by: NURSE PRACTITIONER

## 2021-05-12 PROCEDURE — G8754 DIAS BP LESS 90: HCPCS | Performed by: NURSE PRACTITIONER

## 2021-05-12 PROCEDURE — G8427 DOCREV CUR MEDS BY ELIG CLIN: HCPCS | Performed by: NURSE PRACTITIONER

## 2021-05-12 RX ORDER — ZOLPIDEM TARTRATE 5 MG/1
5 TABLET ORAL
Qty: 30 TAB | Refills: 2 | Status: SHIPPED | OUTPATIENT
Start: 2021-05-12 | End: 2021-08-11 | Stop reason: SDUPTHER

## 2021-05-12 NOTE — PROGRESS NOTES
Identified pt with two pt identifiers(name and ). Reviewed record in preparation for visit and have obtained necessary documentation. Chief Complaint   Patient presents with    Medication Refill    Urinary Frequency     For x1 week; abdominal aching         Health Maintenance Due   Topic    COVID-19 Vaccine (1)    DTaP/Tdap/Td series (1 - Tdap)    Medicare Yearly Exam        Coordination of Care Questionnaire:  :   1) Have you been to an emergency room, urgent care, or hospitalized since your last visit? If yes, where when, and reason for visit? Yes, [facility]      2. Have seen or consulted any other health care  provider since your last visit? If yes, where when, and reason for visit?  no        Patient is accompanied by Self I have received verbal consent from Jenise Selljameson to discuss any/all medical information while they are present in the room.

## 2021-05-12 NOTE — PROGRESS NOTES
Assessment/Plan:     Diagnoses and all orders for this visit:    1. Urinary frequency   -currently on Cipro from [facility]     2. Insomnia, unspecified type  -     zolpidem (AMBIEN) 5 mg tablet; Take 1 Tab by mouth nightly as needed for Sleep. Max Daily Amount: 5 mg.  - Stable, continue current therapy, refill today, advised to see Roberta for refills of ambien. 3. Recurrent UTI  -     REFERRAL TO UROGYNECOLOGY  - Unchanged, ref to Uro-GYN for further treatment             Discussed expected course/resolution/complications of diagnosis in detail with patient. Medication risks/benefits/costs/interactions/alternatives discussed with patient. Pt was given after visit summary which includes diagnoses, current medications & vitals. Pt expressed understanding with the diagnosis and plan        Subjective:      Cecy Carlton is a 66 y.o. female who presents for had concerns including Medication Refill and Urinary Frequency (For x1 week; abdominal aching ). Here today  For refill on ambien. Typically sees Dr. Markos Norris for [facility]. Also complaining of diarrhea from an antibiotic. States 5th UTI in 5 months. Had bladder surgery about 5 years ago. Was order    Current Outpatient Medications   Medication Sig Dispense Refill    zolpidem (AMBIEN) 5 mg tablet Take 1 Tab by mouth nightly as needed for Sleep. Max Daily Amount: 5 mg. 30 Tab 2    rizatriptan benzoate (RIZATRIPTAN PO) Take  by mouth.  ezetimibe (ZETIA) 10 mg tablet TAKE 1 TABLET BY MOUTH DAILY 90 Tab 1    metoprolol tartrate (LOPRESSOR) 25 mg tablet TAKE 1 TABLET BY MOUTH TWICE DAILY 180 Tab 1    gemfibroziL (LOPID) 600 mg tablet TAKE 1 TABLET BY MOUTH TWICE DAILY 180 Tab 1    glycerin-min oil-polycarbophil (Replens) gel Use nightly. Topical use only. Put thin layer in vulvar area. 35 g 0    alendronate (FOSAMAX) 70 mg tablet Take 1 Tab by mouth every seven (7) days.  13 Tab 3    REPATHA SURECLICK pen injection 330 mg Once every 2 weeks.  6    KRILL OIL PO Take 500 mg by mouth daily.  multivitamin (MULTIPLE VITAMINS PO) Take 1 Tab by mouth daily.  Lactobacillus acidophilus (ACIDOPHILUS PO) Take 1 Cap by mouth daily.  topiramate (TOPAMAX) 200 mg tablet Take 200 mg by mouth two (2) times a day.  cholecalciferol, vitamin D3, 2,000 unit tab Take 2,000 Units by mouth daily.  turmeric (CURCUMIN) Take 500 mg by mouth daily.  acetaminophen (TYLENOL EXTRA STRENGTH PO) Take 500 mg by mouth as needed. Takes 2 po as needed for leg or back pain.  cetirizine (ZYRTEC) 10 mg tablet Take 10 mg by mouth daily.  eletriptan (RELPAX) 40 mg tablet Take 40 mg by mouth once as needed. may repeat in 2 hours if necessary         Allergies   Allergen Reactions    Ketorolac Unable to Obtain    Nitrofurantoin Other (comments)     LOW WBC - 2000    Nitrofurantoin Macrocrystal Unable to Obtain    Nsaids (Non-Steroidal Anti-Inflammatory Drug) Other (comments)     GI bleed.  Other Plant, Animal, Environmental Other (comments)     Mold, dust, cats, dog allergies. ENVIRONMENTAL ALLERGIES.     Statins-Hmg-Coa Reductase Inhibitors Other (comments)     LFT increases    Tolmetin Unknown (comments)    Toradol [Ketorolac Tromethamine] Rash     Past Medical History:   Diagnosis Date    Aneurysm ([facility] [address]     splenic artery aneurysm - no longer needs to be followed up - will never be a problem    Arthritis     back    Chronic pain     back    CKD (chronic kidney disease) stage 2, GFR 60-89 ml/min     COVID-19     History of vascular access device 12/28/2020    4 Fr midilne, 10 cm L basilic, poor access    Hyperlipidemia     Hypertension     Ill-defined condition     walks with a cane    Ill-defined condition     cardiac calcium test - some build up/stress test is \"fine\" per pt    Lumbar stenosis     Migraine     Other ill-defined conditions(799.89)     wheezing with allergies    Other ill-defined conditions(799.89)     GI bleed - stomach - NSAID use    Overactive bladder     PUD (peptic ulcer disease)     Stenosis of lumbosacral spine     Thromboembolus [facility])     PE     Past Surgical History:   Procedure Laterality Date    COLONOSCOPY N/A 10/1/2018    COLONOSCOPY performed by Michelle Sanderson [facility] at [facility] ENDOSCOPY    HX HYSTERECTOMY      HX ORTHOPAEDIC  2009    spinal fusion/has not had a lumbar laminectomy    HX ORTHOPAEDIC Bilateral     bunionectomy - bilateral once and unilateral once    HX ORTHOPAEDIC Bilateral     carpal tunnel release    HX TONSILLECTOMY      T & A    HX UROLOGICAL  2012    bladder repair     Family History   Problem Relation Age of Onset    Cancer Father         bladder    Cancer Brother         BCCA    Stroke Mother     Cancer Maternal Uncle         leukemia    Breast Cancer Paternal Aunt     Stroke Maternal Grandmother     Colon Cancer Paternal Grandfather     Cancer Maternal Uncle         throat     Social History     Socioeconomic History    Marital status:      Spouse name: Not on file    Number of children: Not on file    Years of education: Not on file    Highest education level: Not on file   Occupational History    Not on file   Social Needs    Financial resource strain: Not on file    Food insecurity     Worry: Not on file     Inability: Not on file    Transportation needs     Medical: Not on file     Non-medical: Not on file   Tobacco Use    Smoking status: Never Smoker    Smokeless tobacco: Never Used   Substance and Sexual Activity    Alcohol use: Not Currently     Comment: very, very rare    Drug use: Never    Sexual activity: Yes     Partners: Male   Lifestyle    Physical activity     Days per week: Not on file     Minutes per session: Not on file    Stress: Not on file   Relationships    Social connections     Talks on phone: Not on file     Gets together: Not on file     Attends Latter day service: Not on file     Active member of club or organization: Not on file     Attends meetings of clubs or organizations: Not on file     Relationship status: Not on file    Intimate partner violence     Fear of current or ex partner: Not on file     Emotionally abused: Not on file     Physically abused: Not on file     Forced sexual activity: Not on file   Other Topics Concern     Service Not Asked    Blood Transfusions Not Asked    Caffeine Concern Not Asked    Occupational Exposure Not Asked   [employer] Hazards Not Asked    Sleep Concern Not Asked    Stress Concern Not Asked    Weight Concern Not Asked    Special Diet Not Asked    Back Care Not Asked    Exercise Not Asked    Bike Helmet Not Asked   [address] Not Asked    Self-Exams Not Asked   Social History Narrative    Not on file       HPI      ROS:   Review of Systems   Constitutional: Negative for chills, fever and malaise/fatigue. Eyes: Negative for blurred vision. Respiratory: Negative for cough and shortness of breath. Cardiovascular: Negative for chest pain, palpitations and leg swelling. Gastrointestinal: Positive for diarrhea. Genitourinary: Positive for dysuria and frequency. Negative for flank pain, hematuria and urgency. Neurological: Negative for dizziness and headaches. Psychiatric/Behavioral: The patient has insomnia. Objective:     Visit Vitals  /74 (BP 1 Location: Left upper arm, BP Patient Position: Sitting, BP Cuff Size: Adult long)   Pulse 72   Temp 98.2 °F (36.8 °C) (Temporal)   Resp 16   Ht 5' 1\" (1.549 m)   Wt 161 lb 3.2 oz (73.1 kg)   SpO2 98%   BMI 30.46 kg/m²         Vitals and Nurse Documentation reviewed. Physical Exam  Constitutional:       General: She is not in acute distress. Appearance: She is not diaphoretic. HENT:      Head: Normocephalic and atraumatic. Cardiovascular:      Rate and Rhythm: Normal rate and regular rhythm. Heart sounds: Normal heart sounds. No murmur. No friction rub. No gallop.     Pulmonary:      Effort: Pulmonary effort is normal. No respiratory distress. Breath sounds: Normal breath sounds. No wheezing or rales. Skin:     General: Skin is warm and dry. Coloration: Skin is not pale. Neurological:      Mental Status: She is alert and oriented to person, place, and time. Psychiatric:         Mood and Affect: Affect normal. Mood is not anxious or depressed. Behavior: Behavior is not agitated. Thought Content: Thought content does not include homicidal or suicidal ideation. Results for orders placed or performed during the hospital encounter of 01/07/21   CULTURE, BLOOD, PAIRED    Specimen: Blood   Result Value Ref Range    Special Requests: NO SPECIAL REQUESTS      Culture result: NO GROWTH 5 DAYS     TROPONIN I   Result Value Ref Range    Troponin-I, Qt. <0.05 <0.05 ng/mL   CBC WITH AUTOMATED DIFF   Result Value Ref Range    WBC 16.3 (H) 3.6 - 11.0 K/uL    RBC 4.69 3.80 - 5.20 M/uL    HGB 13.4 11.5 - 16.0 g/dL    HCT 42.2 35.0 - 47.0 %    MCV 90.0 80.0 - 99.0 FL    MCH 28.6 26.0 - 34.0 PG    MCHC 31.8 30.0 - 36.5 g/dL    RDW 14.5 11.5 - 14.5 %    PLATELET 326 (H) 510 - 400 K/uL    MPV 10.8 8.9 - 12.9 FL    NRBC 0.0 0  WBC    ABSOLUTE NRBC 0.00 0.00 - 0.01 K/uL    NEUTROPHILS 60 32 - 75 %    LYMPHOCYTES 30 12 - 49 %    MONOCYTES 6 5 - 13 %    EOSINOPHILS 2 0 - 7 %    BASOPHILS 1 0 - 1 %    IMMATURE GRANULOCYTES 1 (H) 0.0 - 0.5 %    ABS. NEUTROPHILS 10.0 (H) 1.8 - 8.0 K/UL    ABS. LYMPHOCYTES 4.8 (H) 0.8 - 3.5 K/UL    ABS. MONOCYTES 0.9 0.0 - 1.0 K/UL    ABS. EOSINOPHILS 0.3 0.0 - 0.4 K/UL    ABS. BASOPHILS 0.1 0.0 - 0.1 K/UL    ABS. IMM.  GRANS. 0.2 (H) 0.00 - 0.04 K/UL    DF AUTOMATED     METABOLIC PANEL, COMPREHENSIVE   Result Value Ref Range    Sodium 137 136 - 145 mmol/L    Potassium 3.7 3.5 - 5.1 mmol/L    Chloride 105 97 - 108 mmol/L    CO2 27 21 - 32 mmol/L    Anion gap 5 5 - 15 mmol/L    Glucose 119 (H) 65 - 100 mg/dL    BUN 26 (H) 6 - 20 MG/DL    Creatinine 1.10 (H) 0.55 - 1.02 MG/DL    BUN/Creatinine ratio 24 (H) 12 - 20      GFR est AA 59 (L) >60 ml/min/1.73m2    GFR est non-AA 49 (L) >60 ml/min/1.73m2    Calcium 10.5 (H) 8.5 - 10.1 MG/DL    Bilirubin, total 0.4 0.2 - 1.0 MG/DL    ALT (SGPT) 23 12 - 78 U/L    AST (SGOT) 20 15 - 37 U/L    Alk.  phosphatase 110 45 - 117 U/L    Protein, total 8.9 (H) 6.4 - 8.2 g/dL    Albumin 3.5 3.5 - 5.0 g/dL    Globulin 5.4 (H) 2.0 - 4.0 g/dL    A-G Ratio 0.6 (L) 1.1 - 2.2     LACTIC ACID   Result Value Ref Range    Lactic acid 1.6 0.4 - 2.0 MMOL/L   SARS-COV-2   Result Value Ref Range    Specimen source Nasopharyngeal      Specimen source Nasopharyngeal      COVID-19 rapid test Not detected NOTD      Specimen type NP Swab     PROCALCITONIN   Result Value Ref Range    Procalcitonin <0.05 ng/mL   MAGNESIUM   Result Value Ref Range    Magnesium 1.8 1.6 - 2.4 mg/dL   PTT   Result Value Ref Range    aPTT 27.9 22.1 - 31.0 sec    aPTT, therapeutic range     58.0 - 77.0 SECS   PROTHROMBIN TIME + INR   Result Value Ref Range    INR 1.2 (H) 0.9 - 1.1      Prothrombin time 12.2 (H) 9.0 - 11.1 sec   FIBRINOGEN   Result Value Ref Range    Fibrinogen 764 (H) 200 - 475 mg/dL   D DIMER   Result Value Ref Range    D-dimer 1.20 (H) 0.00 - 0.65 mg/L FEU   EKG, 12 LEAD, INITIAL   Result Value Ref Range    Ventricular Rate 96 BPM    Atrial Rate 96 BPM    P-R Interval 166 ms    QRS Duration 80 ms    Q-T Interval 350 ms    QTC Calculation (Bezet) 442 ms    Calculated P Axis 37 degrees    Calculated R Axis -20 degrees    Calculated T Axis 49 degrees    Diagnosis       Normal sinus rhythm  Nonspecific ST abnormality  Possible , old Inferior infarct (cited on or before 26-DEC-2020)  When compared with ECG of 31-DEC-2020 12:36,  No significant change  Confirmed by Michael Rosales M.D., Cipriano Cruz ([id number]) on 1/8/2021 9:29:44 PM

## 2021-08-11 ENCOUNTER — OFFICE VISIT (OUTPATIENT)
Dept: FAMILY MEDICINE CLINIC | Age: 71
End: 2021-08-11
Payer: MEDICARE

## 2021-08-11 VITALS
SYSTOLIC BLOOD PRESSURE: 131 MMHG | TEMPERATURE: 97.4 F | BODY MASS INDEX: 30.51 KG/M2 | WEIGHT: 161.6 LBS | RESPIRATION RATE: 16 BRPM | DIASTOLIC BLOOD PRESSURE: 84 MMHG | OXYGEN SATURATION: 95 % | HEIGHT: 61 IN | HEART RATE: 70 BPM

## 2021-08-11 DIAGNOSIS — M81.0 OSTEOPOROSIS, UNSPECIFIED OSTEOPOROSIS TYPE, UNSPECIFIED PATHOLOGICAL FRACTURE PRESENCE: ICD-10-CM

## 2021-08-11 DIAGNOSIS — G43.909 MIGRAINE WITHOUT STATUS MIGRAINOSUS, NOT INTRACTABLE, UNSPECIFIED MIGRAINE TYPE: ICD-10-CM

## 2021-08-11 DIAGNOSIS — G47.00 INSOMNIA, UNSPECIFIED TYPE: ICD-10-CM

## 2021-08-11 DIAGNOSIS — Z00.00 MEDICARE ANNUAL WELLNESS VISIT, SUBSEQUENT: Primary | ICD-10-CM

## 2021-08-11 PROCEDURE — G8754 DIAS BP LESS 90: HCPCS | Performed by: FAMILY MEDICINE

## 2021-08-11 PROCEDURE — 3017F COLORECTAL CA SCREEN DOC REV: CPT | Performed by: FAMILY MEDICINE

## 2021-08-11 PROCEDURE — G8536 NO DOC ELDER MAL SCRN: HCPCS | Performed by: FAMILY MEDICINE

## 2021-08-11 PROCEDURE — 1101F PT FALLS ASSESS-DOCD LE1/YR: CPT | Performed by: FAMILY MEDICINE

## 2021-08-11 PROCEDURE — G0439 PPPS, SUBSEQ VISIT: HCPCS | Performed by: FAMILY MEDICINE

## 2021-08-11 PROCEDURE — G8417 CALC BMI ABV UP PARAM F/U: HCPCS | Performed by: FAMILY MEDICINE

## 2021-08-11 PROCEDURE — 1090F PRES/ABSN URINE INCON ASSESS: CPT | Performed by: FAMILY MEDICINE

## 2021-08-11 PROCEDURE — G8510 SCR DEP NEG, NO PLAN REQD: HCPCS | Performed by: FAMILY MEDICINE

## 2021-08-11 PROCEDURE — G9899 SCRN MAM PERF RSLTS DOC: HCPCS | Performed by: FAMILY MEDICINE

## 2021-08-11 PROCEDURE — G8427 DOCREV CUR MEDS BY ELIG CLIN: HCPCS | Performed by: FAMILY MEDICINE

## 2021-08-11 PROCEDURE — 99214 OFFICE O/P EST MOD 30 MIN: CPT | Performed by: FAMILY MEDICINE

## 2021-08-11 PROCEDURE — G8752 SYS BP LESS 140: HCPCS | Performed by: FAMILY MEDICINE

## 2021-08-11 RX ORDER — ZOLPIDEM TARTRATE 5 MG/1
5 TABLET ORAL
Qty: 30 TABLET | Refills: 2 | Status: SHIPPED | OUTPATIENT
Start: 2021-08-11 | End: 2021-10-28 | Stop reason: SDUPTHER

## 2021-08-11 NOTE — PROGRESS NOTES
Mary Lou Rios  66 y.o. female  1950  [id number]  [facility]  Progress Note     Encounter Date: 8/11/2021    Assessment and Plan:     Encounter Diagnoses     ICD-10-CM ICD-9-CM   1. Medicare annual wellness visit, subsequent  Z00.00 V70.0   2. Insomnia, unspecified type  G47.00 780.52   3. Osteoporosis, unspecified osteoporosis type, unspecified pathological fracture presence  M81.0 733.00   4. Migraine without status migrainosus, not intractable, unspecified migraine type  G43.909 346.90       1. Medicare annual wellness visit, subsequent  updated    2. Insomnia, unspecified type  Stable on ambien   is fine without other controlled substances. - zolpidem (AMBIEN) 5 mg tablet; Take 1 Tablet by mouth nightly as needed for Sleep. Max Daily Amount: 5 mg. Dispense: 30 Tablet; Refill: 2    3. Osteoporosis, unspecified osteoporosis type, unspecified pathological fracture presence  On alendronate. Need to update DEXA  - DEXA BONE DENSITY STUDY AXIAL; Future    4. Migraine without status migrainosus, not intractable, unspecified migraine type  Will refill meds as needed. Will arrange new NEURO follow up of worsening migraines or unable to get current meds. I have discussed the diagnosis with the patient and the intended plan as seen in the above orders. she has expressed understanding. The patient has received an after-visit summary and questions were answered concerning future plans. I have discussed medication side effects and warnings with the patient as well. Electronically Signed: Cris Palomino MD    Current Medications after this visit     Current Outpatient Medications   Medication Sig    diph,pertuss,acel,,tetanus vac,PF, (ADACEL) 2 Lf-(2.5-5-3-5 mcg)-5Lf/0.5 mL syrg vaccine 0.5 mL by IntraMUSCular route once for 1 dose.  zolpidem (AMBIEN) 5 mg tablet Take 1 Tablet by mouth nightly as needed for Sleep. Max Daily Amount: 5 mg.     rizatriptan benzoate (RIZATRIPTAN PO) Take  by mouth.  ezetimibe (ZETIA) 10 mg tablet TAKE 1 TABLET BY MOUTH DAILY    metoprolol tartrate (LOPRESSOR) 25 mg tablet TAKE 1 TABLET BY MOUTH TWICE DAILY    gemfibroziL (LOPID) 600 mg tablet TAKE 1 TABLET BY MOUTH TWICE DAILY    glycerin-min oil-polycarbophil (Replens) gel Use nightly. Topical use only. Put thin layer in vulvar area.  alendronate (FOSAMAX) 70 mg tablet Take 1 Tab by mouth every seven (7) days.  REPATHA SURECLICK pen injection 354 mg Once every 2 weeks.  KRILL OIL PO Take 500 mg by mouth daily.  multivitamin (MULTIPLE VITAMINS PO) Take 1 Tab by mouth daily.  Lactobacillus acidophilus (ACIDOPHILUS PO) Take 1 Cap by mouth daily.  topiramate (TOPAMAX) 200 mg tablet Take 200 mg by mouth two (2) times a day.  cholecalciferol, vitamin D3, 2,000 unit tab Take 2,000 Units by mouth daily.  turmeric (CURCUMIN) Take 500 mg by mouth daily.  acetaminophen (TYLENOL EXTRA STRENGTH PO) Take 500 mg by mouth as needed. Takes 2 po as needed for leg or back pain.  cetirizine (ZYRTEC) 10 mg tablet Take 10 mg by mouth daily. No current facility-administered medications for this visit. Medications Discontinued During This Encounter   Medication Reason    zolpidem (AMBIEN) 5 mg tablet REORDER     ~~~~~~~~~~~~~~~~~~~~~~~~~~~~~~~~~~~~~~~~~~~~~~~~~~~~~~~~~~~    Chief Complaint   Patient presents with    Medication Refill     Ambien / Topiramate/ Rizatriptan       History provided by patient  History of Present Illness   Katie Gomez is a 66 y.o. female who presents to clinic today for:  Medication Refill (Ambien / Topiramate/ Rizatriptan)    Insomnia  Needs refill of ambien  Sleep good with that meds    Migraines  Her Neurologist retired  Wants me to take over prescribing her migraine meds  Currently gets migraines every week or two depending on stress. Lack of sleep makes a big difference to him.     Osteoporosis  Last DEXA 9/2019  Remains on alendronate. Health Maintenance  Completed HM gaps at today's visit  Health Maintenance Due   Topic Date Due    DTaP/Tdap/Td series (1 - Tdap) Never done     Review of Systems   Review of Systems   Constitutional: Negative for chills, fever and weight loss. Respiratory: Negative for shortness of breath. Cardiovascular: Negative for chest pain. Gastrointestinal: Negative for blood in stool. Genitourinary: Negative for hematuria. Musculoskeletal: Positive for back pain. Neurological: Positive for headaches. Psychiatric/Behavioral: Negative. Vitals/Objective:     Vitals:    08/11/21 1040   BP: 131/84   Pulse: 70   Resp: 16   Temp: 97.4 °F (36.3 °C)   TempSrc: Temporal   SpO2: 95%   Weight: 161 lb 9.6 oz (73.3 kg)   Height: 5' 1\" (1.549 m)     Body mass index is 30.53 kg/m². Wt Readings from Last 3 Encounters:   08/11/21 161 lb 9.6 oz (73.3 kg)   05/12/21 161 lb 3.2 oz (73.1 kg)   12/31/20 157 lb 10.1 oz (71.5 kg)         Objective  Physical Exam  Vitals and nursing note reviewed. Constitutional:       Appearance: Normal appearance. She is not toxic-appearing. HENT:      Head: Normocephalic and atraumatic. Cardiovascular:      Rate and Rhythm: Normal rate and regular rhythm. Heart sounds: Normal heart sounds. No murmur heard. No gallop. Pulmonary:      Effort: Pulmonary effort is normal. No respiratory distress. Breath sounds: Normal breath sounds. No wheezing, rhonchi or rales. Musculoskeletal:      Cervical back: No muscular tenderness. Lymphadenopathy:      Cervical: No cervical adenopathy. Neurological:      Mental Status: She is alert. Psychiatric:         Mood and Affect: Mood normal.         Behavior: Behavior normal.         Thought Content: Thought content normal.         Judgment: Judgment normal.           No results found for this or any previous visit (from the past 24 hour(s)).    Disposition     Follow-up and Dispositions  ·   Return in about 3 months (around 11/11/2021) for Blood pressure follow up, Medication follow up. No future appointments. History   Patient's past medical, surgical and family histories were reviewed and updated.     Past Medical History:   Diagnosis Date    Aneurysm [facility])     splenic artery aneurysm - no longer needs to be followed up - will never be a problem    Arthritis     back    Chronic pain     back    CKD (chronic kidney disease) stage 2, GFR 60-89 ml/min     COVID-19     History of vascular access device 12/28/2020    4 Fr midilne, 10 cm L basilic, poor access    Hyperlipidemia     Hypertension     Ill-defined condition     walks with a cane    Ill-defined condition     cardiac calcium test - some build up/stress test is \"fine\" per pt    Lumbar stenosis     Migraine     Other ill-defined conditions(799.89)     wheezing with allergies    Other ill-defined conditions(799.89)     GI bleed - stomach - NSAID use    Overactive bladder     PUD (peptic ulcer disease)     Stenosis of lumbosacral spine     Thromboembolus ([address]     PE     Past Surgical History:   Procedure Laterality Date    COLONOSCOPY N/A 10/1/2018    COLONOSCOPY performed by Miguel Ángel Morton MD at [facility] ENDOSCOPY    HX HYSTERECTOMY      HX ORTHOPAEDIC  2009    spinal fusion/has not had a lumbar laminectomy    HX ORTHOPAEDIC Bilateral     bunionectomy - bilateral once and unilateral once    HX ORTHOPAEDIC Bilateral     carpal tunnel release    HX TONSILLECTOMY      T & A    HX UROLOGICAL  2012    bladder repair     Family History   Problem Relation Age of Onset    Cancer Father         bladder    Cancer Brother         BCCA    Stroke Mother     Cancer Maternal Uncle         leukemia    Breast Cancer Paternal Aunt     Stroke Maternal Grandmother     Colon Cancer Paternal Grandfather     Cancer Maternal Uncle         throat     Social History     Tobacco Use    Smoking status: Never Smoker    Smokeless tobacco: Never Used Vaping Use    Vaping Use: Never used   Substance Use Topics    Alcohol use: Not Currently     Comment: very, very rare    Drug use: Never       Allergies     Allergies   Allergen Reactions    Ketorolac Unable to Obtain    Nitrofurantoin Other (comments)     LOW WBC - 2000    Nitrofurantoin Macrocrystal Unable to Obtain    Nsaids (Non-Steroidal Anti-Inflammatory Drug) Other (comments)     GI bleed.  Other Plant, Animal, Environmental Other (comments)     Mold, dust, cats, dog allergies. ENVIRONMENTAL ALLERGIES.  Statins-Hmg-Coa Reductase Inhibitors Other (comments)     LFT increases    Tolmetin Unknown (comments)    Toradol [Ketorolac Tromethamine] Rash                     This is the Subsequent Medicare Annual Wellness Exam, performed 12 months or more after the Initial AWV or the last Subsequent AWV    I have reviewed the patient's medical history in detail and updated the computerized patient record. Assessment/Plan   Education and counseling provided:  Are appropriate based on today's review and evaluation  End-of-Life planning (with patient's consent)    1. Medicare annual wellness visit, subsequent  2. Insomnia, unspecified type  -     zolpidem (AMBIEN) 5 mg tablet; Take 1 Tablet by mouth nightly as needed for Sleep. Max Daily Amount: 5 mg., Normal, Disp-30 Tablet, R-2  3. Osteoporosis, unspecified osteoporosis type, unspecified pathological fracture presence  -     DEXA BONE DENSITY STUDY AXIAL; Future  4.  Migraine without status migrainosus, not intractable, unspecified migraine type       Depression Risk Factor Screening     3 most recent PHQ Screens 8/11/2021   Little interest or pleasure in doing things Not at all   Feeling down, depressed, irritable, or hopeless Not at all   Total Score PHQ 2 0       Alcohol Risk Screen    Do you average more than 1 drink per night or more than 7 drinks a week:  No    On any one occasion in the past three months have you have had more than 3 drinks containing alcohol:  No    Non smoker    Functional Ability and Level of Safety    Hearing: Hearing is good. Vision up todate   Dental up to date   Activities of Daily Living: The home contains: handrails and grab bars  Patient does total self care      Ambulation: with difficulty, uses a cane     Fall Risk:  Fall Risk Assessment, last 12 mths 8/11/2021   Able to walk? Yes   Fall in past 12 months? 0   Do you feel unsteady? 0   Are you worried about falling 0      Abuse Screen:  Patient is not abused       Cognitive Screening    Has your family/caregiver stated any concerns about your memory: yes - post COVID notes mild memory issues.   Seen by NEURO     Cognitive Screening: Normal - interview    Health Maintenance Due     Health Maintenance Due   Topic Date Due    DTaP/Tdap/Td series (1 - Tdap) Never done       Patient Care Team   Patient Care Team:  Juan Gates MD as PCP - General (Family Medicine)  Juan Gates MD as PCP - [facility] Empaneled Provider  Gordy Muro MD (Neurology)    History     Patient Active Problem List   Diagnosis Code    HTN (hypertension) I10    Hyperlipidemia E78.5    Chronic pain G89.29    Leukocytosis, unspecified D72.829    Anemia, unspecified D64.9    Dehydration E86.0    Renal failure, acute ([address] N17.9    Altered mental status R41.82    Stenosis of lumbosacral spine M48.07    Hyponatremia E87.1    Hyperkalemia E87.5    Acidosis W78.5    Metabolic encephalopathy J08.41    Insomnia G47.00    Acute hypoxemic respiratory failure due to COVID-19 ([address] U07.1, J96.01    UTI (urinary tract infection) N39.0    Migraine G43.909    Osteoporosis M81.0    Obesity E66.9    Acute respiratory failure with hypoxia ([address] J96.01    COVID-19 U07.1     Past Medical History:   Diagnosis Date    Aneurysm ([address]     splenic artery aneurysm - no longer needs to be followed up - will never be a problem    Arthritis     back    Chronic pain     back    CKD (chronic kidney disease) stage 2, GFR 60-89 ml/min     COVID-19     History of vascular access device 12/28/2020    4 Fr midilne, 10 cm L basilic, poor access    Hyperlipidemia     Hypertension     Ill-defined condition     walks with a cane    Ill-defined condition     cardiac calcium test - some build up/stress test is \"fine\" per pt    Lumbar stenosis     Migraine     Other ill-defined conditions(799.89)     wheezing with allergies    Other ill-defined conditions(799.89)     GI bleed - stomach - NSAID use    Overactive bladder     PUD (peptic ulcer disease)     Stenosis of lumbosacral spine     Thromboembolus ([facility]     PE      Past Surgical History:   Procedure Laterality Date    COLONOSCOPY N/A 10/1/2018    COLONOSCOPY performed by Tani Barragan MD at [address] [place] [address] ENDOSCOPY    HX HYSTERECTOMY      HX ORTHOPAEDIC  2009    spinal fusion/has not had a lumbar laminectomy    HX ORTHOPAEDIC Bilateral     bunionectomy - bilateral once and unilateral once    HX ORTHOPAEDIC Bilateral     carpal tunnel release    HX TONSILLECTOMY      T & A    HX UROLOGICAL  2012    bladder repair     Current Outpatient Medications   Medication Sig Dispense Refill    diph,pertuss,acel,,tetanus vac,PF, (ADACEL) 2 Lf-(2.5-5-3-5 mcg)-5Lf/0.5 mL syrg vaccine 0.5 mL by IntraMUSCular route once for 1 dose. 0.5 mL 0    zolpidem (AMBIEN) 5 mg tablet Take 1 Tablet by mouth nightly as needed for Sleep. Max Daily Amount: 5 mg. 30 Tablet 2    rizatriptan benzoate (RIZATRIPTAN PO) Take  by mouth.  ezetimibe (ZETIA) 10 mg tablet TAKE 1 TABLET BY MOUTH DAILY 90 Tab 1    metoprolol tartrate (LOPRESSOR) 25 mg tablet TAKE 1 TABLET BY MOUTH TWICE DAILY 180 Tab 1    gemfibroziL (LOPID) 600 mg tablet TAKE 1 TABLET BY MOUTH TWICE DAILY 180 Tab 1    glycerin-min oil-polycarbophil (Replens) gel Use nightly. Topical use only. Put thin layer in vulvar area. 35 g 0    alendronate (FOSAMAX) 70 mg tablet Take 1 Tab by mouth every seven (7) days.  15 Tab 3    REPATHA SURECLICK pen injection 951 mg Once every 2 weeks. 6    KRILL OIL PO Take 500 mg by mouth daily.  multivitamin (MULTIPLE VITAMINS PO) Take 1 Tab by mouth daily.  Lactobacillus acidophilus (ACIDOPHILUS PO) Take 1 Cap by mouth daily.  topiramate (TOPAMAX) 200 mg tablet Take 200 mg by mouth two (2) times a day.  cholecalciferol, vitamin D3, 2,000 unit tab Take 2,000 Units by mouth daily.  turmeric (CURCUMIN) Take 500 mg by mouth daily.  acetaminophen (TYLENOL EXTRA STRENGTH PO) Take 500 mg by mouth as needed. Takes 2 po as needed for leg or back pain.  cetirizine (ZYRTEC) 10 mg tablet Take 10 mg by mouth daily. Allergies   Allergen Reactions    Ketorolac Unable to Obtain    Nitrofurantoin Other (comments)     LOW WBC - 2000    Nitrofurantoin Macrocrystal Unable to Obtain    Nsaids (Non-Steroidal Anti-Inflammatory Drug) Other (comments)     GI bleed.  Other Plant, Animal, Environmental Other (comments)     Mold, dust, cats, dog allergies. ENVIRONMENTAL ALLERGIES.     Statins-Hmg-Coa Reductase Inhibitors Other (comments)     LFT increases    Tolmetin Unknown (comments)    Toradol [Ketorolac Tromethamine] Rash       Family History   Problem Relation Age of Onset    Cancer Father         bladder    Cancer Brother         BCCA    Stroke Mother     Cancer Maternal Uncle         leukemia    Breast Cancer Paternal Aunt     Stroke Maternal Grandmother     Colon Cancer Paternal Grandfather     Cancer Maternal Uncle         throat     Social History     Tobacco Use    Smoking status: Never Smoker    Smokeless tobacco: Never Used   Substance Use Topics    Alcohol use: Not Currently     Comment: very, very rare         Taryn Holliday MD

## 2021-08-11 NOTE — PATIENT INSTRUCTIONS

## 2021-09-10 ENCOUNTER — TRANSCRIBE ORDER (OUTPATIENT)
Dept: SCHEDULING | Age: 71
End: 2021-09-10

## 2021-09-10 DIAGNOSIS — Z12.31 SCREENING MAMMOGRAM FOR HIGH-RISK PATIENT: Primary | ICD-10-CM

## 2021-09-20 RX ORDER — GEMFIBROZIL 600 MG/1
TABLET, FILM COATED ORAL
Qty: 180 TABLET | Refills: 1 | Status: SHIPPED | OUTPATIENT
Start: 2021-09-20 | End: 2022-04-28 | Stop reason: SDUPTHER

## 2021-09-20 RX ORDER — METOPROLOL TARTRATE 25 MG/1
TABLET, FILM COATED ORAL
Qty: 180 TABLET | Refills: 1 | Status: SHIPPED | OUTPATIENT
Start: 2021-09-20 | End: 2022-03-17

## 2021-10-08 ENCOUNTER — TELEPHONE (OUTPATIENT)
Dept: FAMILY MEDICINE CLINIC | Age: 71
End: 2021-10-08

## 2021-10-08 DIAGNOSIS — M81.0 OSTEOPOROSIS, UNSPECIFIED OSTEOPOROSIS TYPE, UNSPECIFIED PATHOLOGICAL FRACTURE PRESENCE: Primary | ICD-10-CM

## 2021-10-08 NOTE — PROGRESS NOTES
DEXA ordered  10/08/21  4:22 PM    1. Osteoporosis, unspecified osteoporosis type, unspecified pathological fracture presence    - DEXA BONE DENSITY STUDY AXIAL;  Future      Peyman Márquez MD

## 2021-10-08 NOTE — TELEPHONE ENCOUNTER
Please advise- referral in system is showing as canceled      ----- Message from Usha Ledesma sent at 10/8/2021 10:15 AM EDT -----  Regarding: /Telephone  Contact: [phone]  General Message/Vendor Calls    Caller's first and last name: N/A      Reason for call: \"Lab order for Bone Density Scan\"      Callback required yes/no and why: Yes      Best contact number(s): [phone]      Details to clarify the request: Patient stated Lisy Lomeli has been requesting for a new lab order, she has to schedule an appointment with PCP, but need Bone density scan prior to appointment. \"      Usha Ledesma

## 2021-10-08 NOTE — TELEPHONE ENCOUNTER
Order done. Should hear from [facility] to schedule it.   [facility]

## 2021-10-25 ENCOUNTER — HOSPITAL ENCOUNTER (OUTPATIENT)
Dept: MAMMOGRAPHY | Age: 71
Discharge: HOME | End: 2021-10-25
Attending: FAMILY MEDICINE
Payer: MEDICARE

## 2021-10-25 ENCOUNTER — APPOINTMENT (OUTPATIENT)
Dept: MAMMOGRAPHY | Age: 71
End: 2021-10-25
Attending: FAMILY MEDICINE
Payer: MEDICARE

## 2021-10-25 DIAGNOSIS — Z12.31 SCREENING MAMMOGRAM FOR HIGH-RISK PATIENT: ICD-10-CM

## 2021-10-25 PROCEDURE — 77067 SCR MAMMO BI INCL CAD: CPT

## 2021-10-28 ENCOUNTER — OFFICE VISIT (OUTPATIENT)
Dept: FAMILY MEDICINE CLINIC | Age: 71
End: 2021-10-28
Payer: MEDICARE

## 2021-10-28 VITALS
DIASTOLIC BLOOD PRESSURE: 75 MMHG | BODY MASS INDEX: 30.73 KG/M2 | SYSTOLIC BLOOD PRESSURE: 114 MMHG | RESPIRATION RATE: 16 BRPM | HEIGHT: 61 IN | WEIGHT: 162.8 LBS | HEART RATE: 74 BPM | TEMPERATURE: 98 F | OXYGEN SATURATION: 96 %

## 2021-10-28 DIAGNOSIS — G47.00 INSOMNIA, UNSPECIFIED TYPE: ICD-10-CM

## 2021-10-28 DIAGNOSIS — I10 ESSENTIAL HYPERTENSION: ICD-10-CM

## 2021-10-28 DIAGNOSIS — M81.0 OSTEOPOROSIS, UNSPECIFIED OSTEOPOROSIS TYPE, UNSPECIFIED PATHOLOGICAL FRACTURE PRESENCE: ICD-10-CM

## 2021-10-28 DIAGNOSIS — I25.10 CORONARY ARTERY DISEASE INVOLVING NATIVE CORONARY ARTERY OF NATIVE HEART WITHOUT ANGINA PECTORIS: Primary | ICD-10-CM

## 2021-10-28 DIAGNOSIS — E78.5 HYPERLIPIDEMIA, UNSPECIFIED HYPERLIPIDEMIA TYPE: ICD-10-CM

## 2021-10-28 PROCEDURE — 1101F PT FALLS ASSESS-DOCD LE1/YR: CPT | Performed by: FAMILY MEDICINE

## 2021-10-28 PROCEDURE — G9899 SCRN MAM PERF RSLTS DOC: HCPCS | Performed by: FAMILY MEDICINE

## 2021-10-28 PROCEDURE — 3017F COLORECTAL CA SCREEN DOC REV: CPT | Performed by: FAMILY MEDICINE

## 2021-10-28 PROCEDURE — G8752 SYS BP LESS 140: HCPCS | Performed by: FAMILY MEDICINE

## 2021-10-28 PROCEDURE — G8754 DIAS BP LESS 90: HCPCS | Performed by: FAMILY MEDICINE

## 2021-10-28 PROCEDURE — 1090F PRES/ABSN URINE INCON ASSESS: CPT | Performed by: FAMILY MEDICINE

## 2021-10-28 PROCEDURE — G8427 DOCREV CUR MEDS BY ELIG CLIN: HCPCS | Performed by: FAMILY MEDICINE

## 2021-10-28 PROCEDURE — 99214 OFFICE O/P EST MOD 30 MIN: CPT | Performed by: FAMILY MEDICINE

## 2021-10-28 PROCEDURE — G8510 SCR DEP NEG, NO PLAN REQD: HCPCS | Performed by: FAMILY MEDICINE

## 2021-10-28 PROCEDURE — G8536 NO DOC ELDER MAL SCRN: HCPCS | Performed by: FAMILY MEDICINE

## 2021-10-28 PROCEDURE — G8417 CALC BMI ABV UP PARAM F/U: HCPCS | Performed by: FAMILY MEDICINE

## 2021-10-28 RX ORDER — ZOLPIDEM TARTRATE 5 MG/1
5 TABLET ORAL
Qty: 30 TABLET | Refills: 2 | Status: SHIPPED | OUTPATIENT
Start: 2021-10-28 | End: 2022-01-28 | Stop reason: SDUPTHER

## 2021-10-28 NOTE — PROGRESS NOTES
Mendoza Castillo is a 66 y.o. female      Chief Complaint   Patient presents with    Follow-up     3 month f/u medication    Labs         1. Have you been to the ER, urgent care clinic since your last visit? Yes  [facility]  ( UTI) Hospitalized since your last visit? No       2. Have you seen or consulted any other health care providers outside of the [address] [place] [address] since your last visit? Include any pap smears or colon screening.    [facility]

## 2021-10-28 NOTE — PROGRESS NOTES
Lacey Valerio  66 y.o. female  1950  [facility]:[id number]  [facility] FAMILY MEDICINE  Progress Note     Encounter Date: 10/28/2021    Assessment and Plan:     Encounter Diagnoses     ICD-10-CM ICD-9-CM   1. Coronary artery disease involving native coronary artery of native heart without angina pectoris  I25.10 414.01   2. Essential hypertension  I10 401.9   3. Hyperlipidemia, unspecified hyperlipidemia type  E78.5 272.4   4. Osteoporosis, unspecified osteoporosis type, unspecified pathological fracture presence  M81.0 733.00   5. Insomnia, unspecified type  G47.00 780.52       1. Coronary artery disease involving native coronary artery of native heart without angina pectoris  Seeing Cardiology, medical management  - HEPATIC FUNCTION PANEL; Future  - LIPID PANEL; Future    2. Essential hypertension  At goal  - METABOLIC PANEL, BASIC; Future    3. Hyperlipidemia, unspecified hyperlipidemia type  Needs labs  - HEPATIC FUNCTION PANEL; Future  - LIPID PANEL; Future    4. Osteoporosis, unspecified osteoporosis type, unspecified pathological fracture presence  Needs to reschedule  - DEXA BONE DENSITY STUDY AXIAL; Future    5. Insomnia, unspecified type  Refilled   ok without signs of diversion or misuse.   - zolpidem (AMBIEN) 5 mg tablet; Take 1 Tablet by mouth nightly as needed for Sleep. Max Daily Amount: 5 mg. Dispense: 30 Tablet; Refill: 2      I have discussed the diagnosis with the patient and the intended plan as seen in the above orders. she has expressed understanding. The patient has received an after-visit summary and questions were answered concerning future plans. I have discussed medication side effects and warnings with the patient as well. Electronically Signed: Maurice Bernard MD    Current Medications after this visit     Current Outpatient Medications   Medication Sig    zolpidem (AMBIEN) 5 mg tablet Take 1 Tablet by mouth nightly as needed for Sleep. Max Daily Amount: 5 mg.     gemfibroziL (LOPID) 600 mg tablet TAKE 1 TABLET BY MOUTH TWICE DAILY    metoprolol tartrate (LOPRESSOR) 25 mg tablet TAKE 1 TABLET BY MOUTH TWICE DAILY    rizatriptan benzoate (RIZATRIPTAN PO) Take  by mouth.  ezetimibe (ZETIA) 10 mg tablet TAKE 1 TABLET BY MOUTH DAILY    glycerin-min oil-polycarbophil (Replens) gel Use nightly. Topical use only. Put thin layer in vulvar area.  alendronate (FOSAMAX) 70 mg tablet Take 1 Tab by mouth every seven (7) days.  REPATHA SURECLICK pen injection 608 mg Once every 2 weeks.  KRILL OIL PO Take 500 mg by mouth daily.  multivitamin (MULTIPLE VITAMINS PO) Take 1 Tab by mouth daily.  Lactobacillus acidophilus (ACIDOPHILUS PO) Take 1 Cap by mouth daily.  topiramate (TOPAMAX) 200 mg tablet Take 200 mg by mouth two (2) times a day.  cholecalciferol, vitamin D3, 2,000 unit tab Take 2,000 Units by mouth daily.  turmeric (CURCUMIN) Take 500 mg by mouth daily.  acetaminophen (TYLENOL EXTRA STRENGTH PO) Take 500 mg by mouth as needed. Takes 2 po as needed for leg or back pain.  cetirizine (ZYRTEC) 10 mg tablet Take 10 mg by mouth daily. No current facility-administered medications for this visit.      Medications Discontinued During This Encounter   Medication Reason    zolpidem (AMBIEN) 5 mg tablet REORDER     ~~~~~~~~~~~~~~~~~~~~~~~~~~~~~~~~~~~~~~~~~~~~~~~~~~~~~~~~~~~    Chief Complaint   Patient presents with    Follow-up     3 month f/u medication    Labs       History provided by patient  History of Present Illness   Blayne Bhatt is a 66 y.o. female who presents to clinic today for:  Follow-up (3 month f/u medication) and Labs    Lots of recent UTI's seeing [facility]  Now on estrogen creams  On methenamine and Vitamin C and zinc  Scheduled for CT to look for infected stone    Remains on Ambien  Doing well with meds    Osteoporosis  DEXA pending    Hypercholesterolemia  Cardiology handling meds but needs labs  Asked that we do so she can give to cards for [employer] approval    Migraines  Will need refill of meds since her neurologist retired. Health Maintenance  plans to get flu vaccine  Health Maintenance Due   Topic Date Due    Flu Vaccine (1) 09/01/2021     Review of Systems   Review of Systems   Constitutional: Negative for chills, fever and weight loss. Respiratory: Negative for shortness of breath. Cardiovascular: Negative for chest pain and leg swelling. Psychiatric/Behavioral: Negative. Vitals/Objective:     Vitals:    10/28/21 1102   BP: 114/75   Pulse: 74   Resp: 16   Temp: 98 °F (36.7 °C)   TempSrc: Temporal   SpO2: 96%   Weight: 162 lb 12.8 oz (73.8 kg)   Height: 5' 1\" (1.549 m)     Body mass index is 30.76 kg/m². Wt Readings from Last 3 Encounters:   10/28/21 162 lb 12.8 oz (73.8 kg)   08/11/21 161 lb 9.6 oz (73.3 kg)   05/12/21 161 lb 3.2 oz (73.1 kg)         Objective  Physical Exam  Vitals and nursing note reviewed. Constitutional:       Appearance: Normal appearance. She is not toxic-appearing. HENT:      Head: Normocephalic and atraumatic. Cardiovascular:      Rate and Rhythm: Normal rate and regular rhythm. Heart sounds: Normal heart sounds. No murmur heard. No gallop. Pulmonary:      Effort: Pulmonary effort is normal. No respiratory distress. Breath sounds: Normal breath sounds. No wheezing, rhonchi or rales. Musculoskeletal:      Cervical back: No muscular tenderness. Lymphadenopathy:      Cervical: No cervical adenopathy. Neurological:      Mental Status: She is alert. Psychiatric:         Mood and Affect: Mood normal.         Behavior: Behavior normal.         Thought Content: Thought content normal.         Judgment: Judgment normal.           No results found for this or any previous visit (from the past 24 hour(s)). Disposition     No future appointments. History   Patient's past medical, surgical and family histories were reviewed and updated.     Past Medical History: Diagnosis Date    Aneurysm [facility])     splenic artery aneurysm - no longer needs to be followed up - will never be a problem    Arthritis     back    Chronic pain     back    CKD (chronic kidney disease) stage 2, GFR 60-89 ml/min     COVID-19     History of vascular access device 12/28/2020    4 Fr midilne, 10 cm L basilic, poor access    Hyperlipidemia     Hypertension     Ill-defined condition     walks with a cane    Ill-defined condition     cardiac calcium test - some build up/stress test is \"fine\" per pt    Lumbar stenosis     Migraine     Osteoporosis     By DEXA 2019    Other ill-defined conditions(799.89)     wheezing with allergies    Other ill-defined conditions(799.89)     GI bleed - stomach - NSAID use    Overactive bladder     PUD (peptic ulcer disease)     Stenosis of lumbosacral spine     Thromboembolus ([address]     PE     Past Surgical History:   Procedure Laterality Date    COLONOSCOPY N/A 10/1/2018    COLONOS[facility] performed by [facility]ri Lei [facility]    HX HYSTERECTOMY      HX ORTHOPAEDIC  2009    spinal fusion/has not had a lumbar laminectomy    HX ORTHOPAEDIC Bilateral     bunionectomy - bilateral once and unilateral once    HX ORTHOPAEDIC Bilateral     carpal tunnel release    HX TONSILLECTOMY      T & A    HX UROLOGICAL  2012    bladder repair     Family History   Problem Relation Age of Onset    Cancer Father         bladder    Cancer Brother         BCCA    Stroke Mother     Cancer Maternal Uncle         leukemia    Breast Cancer Paternal Aunt     Stroke Maternal Grandmother     Colon Cancer Paternal Grandfather     Cancer Maternal Uncle         throat     Social History     Tobacco Use    Smoking status: Never Smoker    Smokeless tobacco: Never Used   Vaping Use    Vaping Use: Never used   Substance Use Topics    Alcohol use: Not Currently     Comment: very, very rare    Drug use: Never       Allergies     Allergies   Allergen Reactions    Ketorolac Unable to Obtain    Nitrofurantoin Other (comments)     LOW WBC - 2000    Nitrofurantoin Macrocrystal Unable to Obtain    Nsaids (Non-Steroidal Anti-Inflammatory Drug) Other (comments)     GI bleed.  Other Plant, Animal, Environmental Other (comments)     Mold, dust, cats, dog allergies. ENVIRONMENTAL ALLERGIES.     Statins-Hmg-Coa Reductase Inhibitors Other (comments)     LFT increases    Tolmetin Unknown (comments)    Toradol [Ketorolac Tromethamine] Rash

## 2021-11-03 RX ORDER — EZETIMIBE 10 MG/1
TABLET ORAL
Qty: 90 TABLET | Refills: 1 | Status: SHIPPED | OUTPATIENT
Start: 2021-11-03 | End: 2022-06-02

## 2021-11-15 ENCOUNTER — HOSPITAL ENCOUNTER (OUTPATIENT)
Dept: MAMMOGRAPHY | Age: 71
Discharge: HOME | End: 2021-11-15
Attending: FAMILY MEDICINE
Payer: MEDICARE

## 2021-11-15 DIAGNOSIS — M81.0 OSTEOPOROSIS, UNSPECIFIED OSTEOPOROSIS TYPE, UNSPECIFIED PATHOLOGICAL FRACTURE PRESENCE: ICD-10-CM

## 2021-11-15 PROCEDURE — 77080 DXA BONE DENSITY AXIAL: CPT

## 2021-12-20 ENCOUNTER — TELEPHONE (OUTPATIENT)
Dept: FAMILY MEDICINE CLINIC | Age: 71
End: 2021-12-20

## 2021-12-20 DIAGNOSIS — E78.5 HYPERLIPIDEMIA, UNSPECIFIED HYPERLIPIDEMIA TYPE: ICD-10-CM

## 2021-12-20 DIAGNOSIS — I10 ESSENTIAL HYPERTENSION: Primary | ICD-10-CM

## 2021-12-20 LAB
ALBUMIN SERPL-MCNC: 4.7 G/DL (ref 3.5–5)
ALBUMIN/GLOB SERPL: 1.4 {RATIO} (ref 1.1–2.2)
ALP SERPL-CCNC: 91 U/L (ref 45–117)
ALT SERPL-CCNC: 22 U/L (ref 12–78)
ANION GAP SERPL CALC-SCNC: 6 MMOL/L (ref 5–15)
AST SERPL-CCNC: 26 U/L (ref 15–37)
BILIRUB DIRECT SERPL-MCNC: <0.1 MG/DL (ref 0–0.2)
BILIRUB SERPL-MCNC: 0.3 MG/DL (ref 0.2–1)
BUN SERPL-MCNC: 19 MG/DL (ref 6–20)
BUN/CREAT SERPL: 15 (ref 12–20)
CALCIUM SERPL-MCNC: 10 MG/DL (ref 8.5–10.1)
CHLORIDE SERPL-SCNC: 110 MMOL/L (ref 97–108)
CHOLEST SERPL-MCNC: 138 MG/DL
CO2 SERPL-SCNC: 22 MMOL/L (ref 21–32)
COMMENT, HOLDF: NORMAL
CREAT SERPL-MCNC: 1.3 MG/DL (ref 0.55–1.02)
GLOBULIN SER CALC-MCNC: 3.3 G/DL (ref 2–4)
GLUCOSE SERPL-MCNC: 89 MG/DL (ref 65–100)
HDLC SERPL-MCNC: 83 MG/DL
HDLC SERPL: 1.7 {RATIO} (ref 0–5)
LDLC SERPL CALC-MCNC: 44.2 MG/DL (ref 0–100)
POTASSIUM SERPL-SCNC: 5.1 MMOL/L (ref 3.5–5.1)
PROT SERPL-MCNC: 8 G/DL (ref 6.4–8.2)
SAMPLES BEING HELD,HOLD: NORMAL
SODIUM SERPL-SCNC: 138 MMOL/L (ref 136–145)
TRIGL SERPL-MCNC: 54 MG/DL (ref ?–150)
VLDLC SERPL CALC-MCNC: 10.8 MG/DL

## 2021-12-20 NOTE — TELEPHONE ENCOUNTER
It doesn't say she has any active lab orders in her chart, but she has a order sheet from 10/28/21. The sheet said the orders  so she would like those labs reordered so she can have them drawn. Metabolic panel, hepatic function, and lipid panel.

## 2022-01-28 ENCOUNTER — VIRTUAL VISIT (OUTPATIENT)
Dept: FAMILY MEDICINE CLINIC | Age: 72
End: 2022-01-28
Payer: MEDICARE

## 2022-01-28 DIAGNOSIS — G43.909 MIGRAINE WITHOUT STATUS MIGRAINOSUS, NOT INTRACTABLE, UNSPECIFIED MIGRAINE TYPE: ICD-10-CM

## 2022-01-28 DIAGNOSIS — Z13.9 SCREENING DUE: ICD-10-CM

## 2022-01-28 DIAGNOSIS — G47.00 INSOMNIA, UNSPECIFIED TYPE: Primary | ICD-10-CM

## 2022-01-28 PROCEDURE — 99443 PR PHYS/QHP TELEPHONE EVALUATION 21-30 MIN: CPT | Performed by: NURSE PRACTITIONER

## 2022-01-28 PROCEDURE — G8427 DOCREV CUR MEDS BY ELIG CLIN: HCPCS | Performed by: NURSE PRACTITIONER

## 2022-01-28 PROCEDURE — 1090F PRES/ABSN URINE INCON ASSESS: CPT | Performed by: NURSE PRACTITIONER

## 2022-01-28 PROCEDURE — G8756 NO BP MEASURE DOC: HCPCS | Performed by: NURSE PRACTITIONER

## 2022-01-28 PROCEDURE — 3017F COLORECTAL CA SCREEN DOC REV: CPT | Performed by: NURSE PRACTITIONER

## 2022-01-28 PROCEDURE — G8417 CALC BMI ABV UP PARAM F/U: HCPCS | Performed by: NURSE PRACTITIONER

## 2022-01-28 PROCEDURE — G8536 NO DOC ELDER MAL SCRN: HCPCS | Performed by: NURSE PRACTITIONER

## 2022-01-28 PROCEDURE — G8432 DEP SCR NOT DOC, RNG: HCPCS | Performed by: NURSE PRACTITIONER

## 2022-01-28 PROCEDURE — G9899 SCRN MAM PERF RSLTS DOC: HCPCS | Performed by: NURSE PRACTITIONER

## 2022-01-28 PROCEDURE — 1101F PT FALLS ASSESS-DOCD LE1/YR: CPT | Performed by: NURSE PRACTITIONER

## 2022-01-28 RX ORDER — ZOLPIDEM TARTRATE 5 MG/1
5 TABLET ORAL
Qty: 30 TABLET | Refills: 2 | Status: SHIPPED | OUTPATIENT
Start: 2022-01-28 | End: 2022-04-14 | Stop reason: SDUPTHER

## 2022-01-28 RX ORDER — RIZATRIPTAN BENZOATE 10 MG/1
10 TABLET, ORALLY DISINTEGRATING ORAL
Qty: 9 TABLET | Refills: 2 | Status: SHIPPED | OUTPATIENT
Start: 2022-01-28 | End: 2022-01-28

## 2022-01-28 NOTE — PROGRESS NOTES
Quan Ray is a 66 y.o. female, evaluated via audio-only technology on 1/28/2022 for Medication Refill and Concern For COVID-19 (Coronavirus)  . Assessment & Plan:   Diagnoses and all orders for this visit:    1. Insomnia, unspecified type  -     zolpidem (AMBIEN) 5 mg tablet; Take 1 Tablet by mouth nightly as needed for Sleep. Max Daily Amount: 5 mg. Indications: difficulty falling asleep    2. Migraine without status migrainosus, not intractable, unspecified migraine type  -     rizatriptan (MAXALT-MLT) 10 mg disintegrating tablet; Take 1 Tablet by mouth once as needed for Migraine (one tablet per day as needed) for up to 1 dose. Indications: a migraine headache    3. Screening due  -     SARS-COV-2 AB, IGG    Patient was seen by telephone encounter. Patient was in need of medication refill for her migraine headache medication. This previously had been prescribed by neurology however her neurologist has retired. This was refilled for her. Patient was also in need of Ambien refill. This is for insomnia. This was done. Patient was requesting antibody testing for COVID-19. She has previously had COVID-19 and is received all immunizations. She wants her antibodies checked due to family members being her home that are not immunized. I explained that this test is not always accurate. Patient understands and will follow-up as needed. She does report upcoming appointment with nephrology to have kidney stone removed. 12  Subjective:       Prior to Admission medications    Medication Sig Start Date End Date Taking? Authorizing Provider   rizatriptan (MAXALT-MLT) 10 mg disintegrating tablet Take 1 Tablet by mouth once as needed for Migraine (one tablet per day as needed) for up to 1 dose. Indications: a migraine headache 1/28/22 1/28/22 Yes Yi Davalos NP   zolpidem (AMBIEN) 5 mg tablet Take 1 Tablet by mouth nightly as needed for Sleep. Max Daily Amount: 5 mg.  Indications: difficulty falling asleep 1/28/22  Yes Lisa Chester NP   ezetimibe (ZETIA) 10 mg tablet TAKE 1 TABLET BY MOUTH DAILY 11/3/21   Thor Govea MD   zolpidem (AMBIEN) 5 mg tablet Take 1 Tablet by mouth nightly as needed for Sleep. Max Daily Amount: 5 mg. 10/28/21 1/28/22  Thor Govea MD   gemfibroziL (LOPID) 600 mg tablet TAKE 1 TABLET BY MOUTH TWICE DAILY 9/20/21   Thor Govea MD   metoprolol tartrate (LOPRESSOR) 25 mg tablet TAKE 1 TABLET BY MOUTH TWICE DAILY 9/20/21   Thor Govea MD   rizatriptan benzoate (RIZATRIPTAN PO) Take  by mouth.  1/28/22  Provider, Historical   glycerin-min oil-polycarbophil (Replens) gel Use nightly. Topical use only. Put thin layer in vulvar area. 12/29/20   Ervin Dinh MD   alendronate (FOSAMAX) 70 mg tablet Take 1 Tab by mouth every seven (7) days. 12/8/20   Thor Govea MD   REPATHA SURECLICK pen injection 746 mg Once every 2 weeks. 10/30/19   Provider, Historical   KRILL OIL PO Take 500 mg by mouth daily. Provider, Historical   multivitamin (MULTIPLE VITAMINS PO) Take 1 Tab by mouth daily. Provider, Historical   Lactobacillus acidophilus (ACIDOPHILUS PO) Take 1 Cap by mouth daily. Provider, Historical   cholecalciferol, vitamin D3, 2,000 unit tab Take 2,000 Units by mouth daily. Provider, Historical   turmeric (CURCUMIN) Take 500 mg by mouth daily. Provider, Historical   acetaminophen (TYLENOL EXTRA STRENGTH PO) Take 500 mg by mouth as needed. Takes 2 po as needed for leg or back pain. Provider, Historical   topiramate (TOPAMAX) 200 mg tablet Take 200 mg by mouth two (2) times a day. 1/28/22  Provider, Historical   cetirizine (ZYRTEC) 10 mg tablet Take 10 mg by mouth daily.       Provider, Historical     Patient Active Problem List   Diagnosis Code    HTN (hypertension) I10    Hyperlipidemia E78.5    Chronic pain G89.29    Leukocytosis, unspecified D72.829    Anemia, unspecified D64.9    Dehydration E86.0    Renal failure, acute ([facility] [address] N17.9    Altered mental status R41.82    Stenosis of lumbosacral spine M48.07    Hyponatremia E87.1    Hyperkalemia E87.5    Acidosis S44.2    Metabolic encephalopathy Z43.70    Insomnia G47.00    Acute hypoxemic respiratory failure due to COVID-19 (HCC) U07.1, J96.01    UTI (urinary tract infection) N39.0    Migraine G43.909    Osteoporosis M81.0    Obesity E66.9    Acute respiratory failure with hypoxia ([facility]) J96.01    COVID-19 U07.1     Patient Active Problem List    Diagnosis Date Noted    Acute respiratory failure with hypoxia ([facility] [address] 12/31/2020    COVID-19 12/31/2020    UTI (urinary tract infection) 12/27/2020    Migraine 12/27/2020    Osteoporosis 12/27/2020    Obesity 12/27/2020    Acute hypoxemic respiratory failure due to COVID-19 ([facility] 12/26/2020    Insomnia 12/06/2018    Hyponatremia 06/09/2012    Hyperkalemia 06/09/2012    Acidosis 97/51/0568    Metabolic encephalopathy 32/35/0463    Leukocytosis, unspecified 10/11/2011    Anemia, unspecified 10/11/2011    Dehydration 10/11/2011    Renal failure, acute ([address] 10/11/2011    Altered mental status 10/11/2011    HTN (hypertension)     Hyperlipidemia     Chronic pain     Stenosis of lumbosacral spine      Current Outpatient Medications   Medication Sig Dispense Refill    rizatriptan (MAXALT-MLT) 10 mg disintegrating tablet Take 1 Tablet by mouth once as needed for Migraine (one tablet per day as needed) for up to 1 dose. Indications: a migraine headache 9 Tablet 2    zolpidem (AMBIEN) 5 mg tablet Take 1 Tablet by mouth nightly as needed for Sleep. Max Daily Amount: 5 mg.  Indications: difficulty falling asleep 30 Tablet 2    ezetimibe (ZETIA) 10 mg tablet TAKE 1 TABLET BY MOUTH DAILY 90 Tablet 1    gemfibroziL (LOPID) 600 mg tablet TAKE 1 TABLET BY MOUTH TWICE DAILY 180 Tablet 1    metoprolol tartrate (LOPRESSOR) 25 mg tablet TAKE 1 TABLET BY MOUTH TWICE DAILY 180 Tablet 1    glycerin-min oil-polycarbophil (Replens) gel Use nightly. Topical use only. Put thin layer in vulvar area. 35 g 0    alendronate (FOSAMAX) 70 mg tablet Take 1 Tab by mouth every seven (7) days. 13 Tab 3    REPATHA SURECLICK pen injection 685 mg Once every 2 weeks. 6    KRILL OIL PO Take 500 mg by mouth daily.  multivitamin (MULTIPLE VITAMINS PO) Take 1 Tab by mouth daily.  Lactobacillus acidophilus (ACIDOPHILUS PO) Take 1 Cap by mouth daily.  cholecalciferol, vitamin D3, 2,000 unit tab Take 2,000 Units by mouth daily.  turmeric (CURCUMIN) Take 500 mg by mouth daily.  acetaminophen (TYLENOL EXTRA STRENGTH PO) Take 500 mg by mouth as needed. Takes 2 po as needed for leg or back pain.  cetirizine (ZYRTEC) 10 mg tablet Take 10 mg by mouth daily. Allergies   Allergen Reactions    Ketorolac Unable to Obtain    Nitrofurantoin Other (comments)     LOW WBC - 2000    Nitrofurantoin Macrocrystal Unable to Obtain    Nsaids (Non-Steroidal Anti-Inflammatory Drug) Other (comments)     GI bleed.  Other Plant, Animal, Environmental Other (comments)     Mold, dust, cats, dog allergies. ENVIRONMENTAL ALLERGIES.     Statins-Hmg-Coa Reductase Inhibitors Other (comments)     LFT increases    Tolmetin Unknown (comments)    Toradol [Ketorolac Tromethamine] Rash     Past Medical History:   Diagnosis Date    Aneurysm ([facility] [address]     splenic artery aneurysm - no longer needs to be followed up - will never be a problem    Arthritis     back    Chronic pain     back    CKD (chronic kidney disease) stage 2, GFR 60-89 ml/min     COVID-19     History of vascular access device 12/28/2020    4 Fr midilne, 10 cm L basilic, poor access    Hyperlipidemia     Hypertension     Ill-defined condition     walks with a cane    Ill-defined condition     cardiac calcium test - some build up/stress test is \"fine\" per pt    Lumbar stenosis     Migraine     Osteoporosis     By DEXA 2019    Other ill-defined conditions(799.89)     wheezing with allergies    Other ill-defined conditions(799.89)     GI bleed - stomach - NSAID use    Overactive bladder     PUD (peptic ulcer disease)     Stenosis of lumbosacral spine     Thromboembolus ([address]     PE       Review of Systems   Constitutional: Negative for fever and malaise/fatigue. HENT: Negative for congestion, sinus pain and sore throat. Respiratory: Negative for cough and shortness of breath. Cardiovascular: Negative for chest pain. Gastrointestinal: Negative for diarrhea, nausea and vomiting. Genitourinary: Negative for dysuria. Skin: Negative. Neurological: Positive for headaches. Negative for dizziness. Endo/Heme/Allergies: Negative for environmental allergies. Psychiatric/Behavioral: The patient has insomnia. No data recorded     José Miguel Son, who was evaluated through a patient-initiated, synchronous (real-time) audio only encounter, and/or her healthcare decision maker, is aware that it is a billable service, which includes applicable co-pays, with coverage as determined by her insurance carrier. She provided verbal consent to proceed. She has not had a related appointment within my department in the past 7 days or scheduled within the next 24 hours. The patient was located at home in a state where the provider was licensed to provide care. On this date 01/28/2022 I have spent 25 minutes reviewing previous notes, test results and face to face (virtual) with the patient discussing the diagnosis and importance of compliance with the treatment plan as well as documenting on the day of the visit.     Samuel Peter NP

## 2022-02-25 ENCOUNTER — TELEPHONE (OUTPATIENT)
Dept: FAMILY MEDICINE CLINIC | Age: 72
End: 2022-02-25

## 2022-02-25 DIAGNOSIS — R21 RASH OF GROIN: Primary | ICD-10-CM

## 2022-02-25 RX ORDER — NYSTATIN 100000 [USP'U]/G
POWDER TOPICAL 4 TIMES DAILY
Qty: 60 G | Refills: 2 | Status: SHIPPED | OUTPATIENT
Start: 2022-02-25

## 2022-02-25 NOTE — TELEPHONE ENCOUNTER
----- Message from Jennifer De León sent at 2/25/2022  8:48 AM EST -----  Subject: Message to Provider    QUESTIONS  Information for Provider? Gerardo/ÁNGEL Nurse mervat pt has rash in groin area &   thighs - wondering if nystatin order would be appropriate for pt  ---------------------------------------------------------------------------  --------------  CALL BACK INFO  What is the best way for the office to contact you? OK to leave message on   voicemail  Preferred Call Back Phone Number? [phone]  ---------------------------------------------------------------------------  --------------  SCRIPT ANSWERS  Relationship to Patient? Third Party  Representative Name?  Shimon Goldsmith

## 2022-03-01 ENCOUNTER — TELEPHONE (OUTPATIENT)
Dept: FAMILY MEDICINE CLINIC | Age: 72
End: 2022-03-01

## 2022-03-02 NOTE — TELEPHONE ENCOUNTER
Steven Hernandez from [facility] Name and  verified      Let him know rx was called into to patient pharmacy

## 2022-03-08 ENCOUNTER — TELEPHONE (OUTPATIENT)
Dept: FAMILY MEDICINE CLINIC | Age: 72
End: 2022-03-08

## 2022-03-08 NOTE — TELEPHONE ENCOUNTER
Pt is calling for an order for UA due to sharp bladder pains/discomfort urinating. Her home health nurse will be with her today at noon, and she was wondering if she can have the order for that.     Thanks

## 2022-03-08 NOTE — TELEPHONE ENCOUNTER
IF home health thinks she has UTI, she needs to be seen either here for urgent care  [facility]

## 2022-03-08 NOTE — TELEPHONE ENCOUNTER
----- Message from Santana Hillman sent at 3/8/2022  9:00 AM EST -----  Subject: Message to Provider    QUESTIONS  Information for Provider? Samara wants verbal order for UACNS call Samara   back on [phone]  ---------------------------------------------------------------------------  --------------  CALL BACK INFO  What is the best way for the office to contact you? OK to leave message on   voicemail  Preferred Call Back Phone Number? [phone]  ---------------------------------------------------------------------------  --------------  SCRIPT ANSWERS  Relationship to Patient? Third Party  Representative Name?  [facility]

## 2022-03-14 ENCOUNTER — TELEPHONE (OUTPATIENT)
Dept: FAMILY MEDICINE CLINIC | Age: 72
End: 2022-03-14

## 2022-03-14 NOTE — TELEPHONE ENCOUNTER
LVM that we received a fax from [facility] and on the fax her birth date and middle initial is something different than we have in our system. I was calling to confirm her birth date and middle name.

## 2022-03-17 RX ORDER — METOPROLOL TARTRATE 25 MG/1
TABLET, FILM COATED ORAL
Qty: 180 TABLET | Refills: 1 | Status: SHIPPED | OUTPATIENT
Start: 2022-03-17

## 2022-03-18 PROBLEM — J96.01 ACUTE RESPIRATORY FAILURE WITH HYPOXIA (HCC): Status: ACTIVE | Noted: 2020-12-31

## 2022-03-18 PROBLEM — G47.00 INSOMNIA: Status: ACTIVE | Noted: 2018-12-06

## 2022-03-18 PROBLEM — E66.9 OBESITY: Status: ACTIVE | Noted: 2020-12-27

## 2022-03-18 PROBLEM — U07.1 COVID-19: Status: ACTIVE | Noted: 2020-12-31

## 2022-03-19 PROBLEM — M81.0 OSTEOPOROSIS: Status: ACTIVE | Noted: 2020-12-27

## 2022-03-19 PROBLEM — N39.0 UTI (URINARY TRACT INFECTION): Status: ACTIVE | Noted: 2020-12-27

## 2022-03-19 PROBLEM — J96.01 ACUTE HYPOXEMIC RESPIRATORY FAILURE DUE TO COVID-19 (HCC): Status: ACTIVE | Noted: 2020-12-26

## 2022-03-19 PROBLEM — U07.1 ACUTE HYPOXEMIC RESPIRATORY FAILURE DUE TO COVID-19 (HCC): Status: ACTIVE | Noted: 2020-12-26

## 2022-03-19 PROBLEM — G43.909 MIGRAINE: Status: ACTIVE | Noted: 2020-12-27

## 2022-04-14 ENCOUNTER — APPOINTMENT (OUTPATIENT)
Dept: CT IMAGING | Age: 72
End: 2022-04-14
Attending: EMERGENCY MEDICINE
Payer: MEDICARE

## 2022-04-14 ENCOUNTER — OFFICE VISIT (OUTPATIENT)
Dept: FAMILY MEDICINE CLINIC | Age: 72
End: 2022-04-14
Payer: MEDICARE

## 2022-04-14 ENCOUNTER — APPOINTMENT (OUTPATIENT)
Dept: GENERAL RADIOLOGY | Age: 72
End: 2022-04-14
Attending: EMERGENCY MEDICINE
Payer: MEDICARE

## 2022-04-14 ENCOUNTER — HOSPITAL ENCOUNTER (EMERGENCY)
Age: 72
Discharge: HOME | End: 2022-04-14
Attending: EMERGENCY MEDICINE
Payer: MEDICARE

## 2022-04-14 VITALS
HEART RATE: 83 BPM | DIASTOLIC BLOOD PRESSURE: 80 MMHG | SYSTOLIC BLOOD PRESSURE: 131 MMHG | TEMPERATURE: 98.4 F | OXYGEN SATURATION: 100 % | BODY MASS INDEX: 29.84 KG/M2 | RESPIRATION RATE: 16 BRPM | WEIGHT: 152 LBS | HEIGHT: 60 IN

## 2022-04-14 DIAGNOSIS — R61 NIGHT SWEATS: Primary | ICD-10-CM

## 2022-04-14 DIAGNOSIS — S70.12XA HEMATOMA OF ILIOPSOAS MUSCLE, LEFT, INITIAL ENCOUNTER: Primary | ICD-10-CM

## 2022-04-14 DIAGNOSIS — G47.00 INSOMNIA, UNSPECIFIED TYPE: ICD-10-CM

## 2022-04-14 LAB
ALBUMIN SERPL-MCNC: 3.6 G/DL (ref 3.5–5)
ALBUMIN/GLOB SERPL: 0.7 {RATIO} (ref 1.1–2.2)
ALP SERPL-CCNC: 144 U/L (ref 45–117)
ALT SERPL-CCNC: 13 U/L (ref 12–78)
ANION GAP SERPL CALC-SCNC: 7 MMOL/L (ref 5–15)
APPEARANCE UR: CLEAR
AST SERPL-CCNC: 23 U/L (ref 15–37)
BACTERIA URNS QL MICRO: NEGATIVE /HPF
BASOPHILS # BLD: 0.1 K/UL (ref 0–0.1)
BASOPHILS NFR BLD: 1 % (ref 0–1)
BILIRUB SERPL-MCNC: 0.4 MG/DL (ref 0.2–1)
BILIRUB UR QL: NEGATIVE
BUN SERPL-MCNC: 22 MG/DL (ref 6–20)
BUN/CREAT SERPL: 19 (ref 12–20)
CALCIUM SERPL-MCNC: 10.3 MG/DL (ref 8.5–10.1)
CHLORIDE SERPL-SCNC: 105 MMOL/L (ref 97–108)
CO2 SERPL-SCNC: 26 MMOL/L (ref 21–32)
COLOR UR: ABNORMAL
COMMENT, HOLDF: NORMAL
CREAT SERPL-MCNC: 1.16 MG/DL (ref 0.55–1.02)
D DIMER PPP FEU-MCNC: 4.38 MG/L FEU (ref 0–0.65)
DIFFERENTIAL METHOD BLD: ABNORMAL
EOSINOPHIL # BLD: 0.4 K/UL (ref 0–0.4)
EOSINOPHIL NFR BLD: 4 % (ref 0–7)
EPITH CASTS URNS QL MICRO: ABNORMAL /LPF
ERYTHROCYTE [DISTWIDTH] IN BLOOD BY AUTOMATED COUNT: 14.6 % (ref 11.5–14.5)
GLOBULIN SER CALC-MCNC: 5.2 G/DL (ref 2–4)
GLUCOSE SERPL-MCNC: 92 MG/DL (ref 65–100)
GLUCOSE UR STRIP.AUTO-MCNC: NEGATIVE MG/DL
HCT VFR BLD AUTO: 39.1 % (ref 35–47)
HGB BLD-MCNC: 12.2 G/DL (ref 11.5–16)
HGB UR QL STRIP: NEGATIVE
HYALINE CASTS URNS QL MICRO: ABNORMAL /LPF (ref 0–2)
IMM GRANULOCYTES # BLD AUTO: 0 K/UL (ref 0–0.04)
IMM GRANULOCYTES NFR BLD AUTO: 0 % (ref 0–0.5)
KETONES UR QL STRIP.AUTO: NEGATIVE MG/DL
LACTATE SERPL-SCNC: 0.8 MMOL/L (ref 0.4–2)
LEUKOCYTE ESTERASE UR QL STRIP.AUTO: NEGATIVE
LYMPHOCYTES # BLD: 3.9 K/UL (ref 0.8–3.5)
LYMPHOCYTES NFR BLD: 38 % (ref 12–49)
MCH RBC QN AUTO: 27.1 PG (ref 26–34)
MCHC RBC AUTO-ENTMCNC: 31.2 G/DL (ref 30–36.5)
MCV RBC AUTO: 86.7 FL (ref 80–99)
MONOCYTES # BLD: 0.7 K/UL (ref 0–1)
MONOCYTES NFR BLD: 6 % (ref 5–13)
NEUTS SEG # BLD: 5.4 K/UL (ref 1.8–8)
NEUTS SEG NFR BLD: 51 % (ref 32–75)
NITRITE UR QL STRIP.AUTO: NEGATIVE
NRBC # BLD: 0 K/UL (ref 0–0.01)
NRBC BLD-RTO: 0 PER 100 WBC
PH UR STRIP: 5.5 [PH] (ref 5–8)
PLATELET # BLD AUTO: 425 K/UL (ref 150–400)
PMV BLD AUTO: 11.1 FL (ref 8.9–12.9)
POTASSIUM SERPL-SCNC: 4.3 MMOL/L (ref 3.5–5.1)
PROT SERPL-MCNC: 8.8 G/DL (ref 6.4–8.2)
PROT UR STRIP-MCNC: NEGATIVE MG/DL
RBC # BLD AUTO: 4.51 M/UL (ref 3.8–5.2)
RBC #/AREA URNS HPF: ABNORMAL /HPF (ref 0–5)
SAMPLES BEING HELD,HOLD: NORMAL
SODIUM SERPL-SCNC: 138 MMOL/L (ref 136–145)
SP GR UR REFRACTOMETRY: 1.02 (ref 1–1.03)
TROPONIN-HIGH SENSITIVITY: 4 NG/L (ref 0–51)
UR CULT HOLD, URHOLD: NORMAL
UROBILINOGEN UR QL STRIP.AUTO: 0.2 EU/DL (ref 0.2–1)
WBC # BLD AUTO: 10.4 K/UL (ref 3.6–11)
WBC URNS QL MICRO: ABNORMAL /HPF (ref 0–4)

## 2022-04-14 PROCEDURE — 71275 CT ANGIOGRAPHY CHEST: CPT

## 2022-04-14 PROCEDURE — 84484 ASSAY OF TROPONIN QUANT: CPT

## 2022-04-14 PROCEDURE — 93005 ELECTROCARDIOGRAM TRACING: CPT

## 2022-04-14 PROCEDURE — 36415 COLL VENOUS BLD VENIPUNCTURE: CPT

## 2022-04-14 PROCEDURE — 85379 FIBRIN DEGRADATION QUANT: CPT

## 2022-04-14 PROCEDURE — G8754 DIAS BP LESS 90: HCPCS | Performed by: FAMILY MEDICINE

## 2022-04-14 PROCEDURE — 80053 COMPREHEN METABOLIC PANEL: CPT

## 2022-04-14 PROCEDURE — 81001 URINALYSIS AUTO W/SCOPE: CPT

## 2022-04-14 PROCEDURE — G9899 SCRN MAM PERF RSLTS DOC: HCPCS | Performed by: FAMILY MEDICINE

## 2022-04-14 PROCEDURE — 83605 ASSAY OF LACTIC ACID: CPT

## 2022-04-14 PROCEDURE — 1090F PRES/ABSN URINE INCON ASSESS: CPT | Performed by: FAMILY MEDICINE

## 2022-04-14 PROCEDURE — 71046 X-RAY EXAM CHEST 2 VIEWS: CPT

## 2022-04-14 PROCEDURE — 87040 BLOOD CULTURE FOR BACTERIA: CPT

## 2022-04-14 PROCEDURE — G8536 NO DOC ELDER MAL SCRN: HCPCS | Performed by: FAMILY MEDICINE

## 2022-04-14 PROCEDURE — 74011000636 HC RX REV CODE- 636: Performed by: EMERGENCY MEDICINE

## 2022-04-14 PROCEDURE — G8427 DOCREV CUR MEDS BY ELIG CLIN: HCPCS | Performed by: FAMILY MEDICINE

## 2022-04-14 PROCEDURE — G8753 SYS BP > OR = 140: HCPCS | Performed by: FAMILY MEDICINE

## 2022-04-14 PROCEDURE — 1101F PT FALLS ASSESS-DOCD LE1/YR: CPT | Performed by: FAMILY MEDICINE

## 2022-04-14 PROCEDURE — 99214 OFFICE O/P EST MOD 30 MIN: CPT | Performed by: FAMILY MEDICINE

## 2022-04-14 PROCEDURE — G8417 CALC BMI ABV UP PARAM F/U: HCPCS | Performed by: FAMILY MEDICINE

## 2022-04-14 PROCEDURE — 99285 EMERGENCY DEPT VISIT HI MDM: CPT

## 2022-04-14 PROCEDURE — 85025 COMPLETE CBC W/AUTO DIFF WBC: CPT

## 2022-04-14 PROCEDURE — 3017F COLORECTAL CA SCREEN DOC REV: CPT | Performed by: FAMILY MEDICINE

## 2022-04-14 PROCEDURE — G8432 DEP SCR NOT DOC, RNG: HCPCS | Performed by: FAMILY MEDICINE

## 2022-04-14 RX ORDER — ZOLPIDEM TARTRATE 5 MG/1
5 TABLET ORAL
Qty: 30 TABLET | Refills: 2 | Status: SHIPPED | OUTPATIENT
Start: 2022-04-14 | End: 2022-07-18 | Stop reason: SDUPTHER

## 2022-04-14 RX ADMIN — IOPAMIDOL 100 ML: 755 INJECTION, SOLUTION INTRAVENOUS at 14:30

## 2022-04-14 NOTE — ED PROVIDER NOTES
The history is provided by the patient. Sweats   This is a chronic problem. The current episode started more than 1 week ago. The problem occurs constantly. The problem has not changed since onset. Pertinent negatives include no chest pain, no fussiness, no sleepiness, no diarrhea, no vomiting, no congestion, no headaches, no sore throat, no tugging at ear, no muscle aches, no cough, no shortness of breath, no mental status change, no neck pain, no rash and no urinary symptoms. She has tried nothing for the symptoms. The treatment provided no relief.         Past Medical History:   Diagnosis Date    Aneurysm [facility])     splenic artery aneurysm - no longer needs to be followed up - will never be a problem    Arthritis     back    Chronic pain     back    CKD (chronic kidney disease) stage 2, GFR 60-89 ml/min     COVID-19     History of vascular access device 12/28/2020    4 Fr midilne, 10 cm L basilic, poor access    Hyperlipidemia     Hypertension     Ill-defined condition     walks with a cane    Ill-defined condition     cardiac calcium test - some build up/stress test is \"fine\" per pt    Lumbar stenosis     Migraine     Osteoporosis     By DEXA 2019    Other ill-defined conditions(799.89)     wheezing with allergies    Other ill-defined conditions(799.89)     GI bleed - stomach - NSAID use    Overactive bladder     PUD (peptic ulcer disease)     Stenosis of lumbosacral spine     Thromboembolus ([facility] [address]     PE       Past Surgical History:   Procedure Laterality Date    COLONOSCOPY N/A 10/1/2018    COLONOSCOPY performed by Madison Blood MD at [facility] ENDOSCOPY    HX HYSTERECTOMY      HX ORTHOPAEDIC  2009    spinal fusion/has not had a lumbar laminectomy    HX ORTHOPAEDIC Bilateral     bunionectomy - bilateral once and unilateral once    HX ORTHOPAEDIC Bilateral     carpal tunnel release    HX TONSILLECTOMY      T & A    HX UROLOGICAL  2012    bladder repair         Family History:   Problem Relation Age of Onset    Cancer Father         bladder    Cancer Brother         BCCA    Stroke Mother     Cancer Maternal Uncle         leukemia    Breast Cancer Paternal Aunt     Stroke Maternal Grandmother     Colon Cancer Paternal Grandfather     Cancer Maternal Uncle         throat       Social History     Socioeconomic History    Marital status:      Spouse name: Not on file    Number of children: Not on file    Years of education: Not on file    Highest education level: Not on file   Occupational History    Not on file   Tobacco Use    Smoking status: Never Smoker    Smokeless tobacco: Never Used   Vaping Use    Vaping Use: Never used   Substance and Sexual Activity    Alcohol use: Not Currently     Comment: very, very rare    Drug use: Never    Sexual activity: Yes     Partners: Male   Other Topics Concern     Service Not Asked    Blood Transfusions Not Asked    Caffeine Concern Not Asked    Occupational Exposure Not Asked    Hobby Hazards Not Asked    Sleep Concern Not Asked    Stress Concern Not Asked    Weight Concern Not Asked    Special Diet Not Asked    Back Care Not Asked    Exercise Not Asked    Bike Helmet Not Asked    Seat Belt Not Asked    Self-Exams Not Asked   Social History Narrative    Not on file     Social Determinants of Health     Financial Resource Strain:     Difficulty of Paying Living Expenses: Not on file   Food Insecurity:     Worried About Running Out of Food in the Last Year: Not on file    Jaden of Food in the Last Year: Not on file   Transportation Needs:     Lack of Transportation (Medical): Not on file    Lack of Transportation (Non-Medical):  Not on file   Physical Activity:     Days of Exercise per Week: Not on file    Minutes of Exercise per Session: Not on file   Stress:     Feeling of Stress : Not on file   Social Connections:     Frequency of Communication with Friends and Family: Not on file    Frequency of Social Gatherings with Friends and Family: Not on file    Attends Judaism Services: Not on file    Active Member of Clubs or Organizations: Not on file    Attends Club or Organization Meetings: Not on file    Marital Status: Not on file   Intimate Partner Violence:     Fear of Current or Ex-Partner: Not on file    Emotionally Abused: Not on file    Physically Abused: Not on file    Sexually Abused: Not on file   Housing Stability:     Unable to Pay for Housing in the Last Year: Not on file    Number of [place] in the Last Year: Not on file    Unstable Housing in the Last Year: Not on file         ALLERGIES: Ciprofloxacin; Ketorolac; Nitrofurantoin; Nitrofurantoin macrocrystal; Nsaids (non-steroidal anti-inflammatory drug); Other plant, animal, environmental; Statins-hmg-coa reductase inhibitors; Tolmetin; and Toradol [ketorolac tromethamine]    Review of Systems   Constitutional: Negative for activity change, chills and fever. HENT: Negative for congestion, nosebleeds, sore throat, trouble swallowing and voice change. Eyes: Negative for visual disturbance. Respiratory: Negative for cough and shortness of breath. Cardiovascular: Negative for chest pain and palpitations. Gastrointestinal: Negative for abdominal pain, constipation, diarrhea, nausea and vomiting. Genitourinary: Negative for difficulty urinating, dysuria, hematuria and urgency. Musculoskeletal: Negative for back pain, neck pain and neck stiffness. Skin: Negative for color change and rash. Allergic/Immunologic: Negative for immunocompromised state. Neurological: Negative for dizziness, seizures, syncope, weakness, light-headedness, numbness and headaches. Psychiatric/Behavioral: Negative for behavioral problems, confusion, hallucinations, self-injury and suicidal ideas.        Vitals:    04/14/22 1150   BP: (!) 154/85   Pulse: 83   Resp: 16   Temp: 98.4 °F (36.9 °C)   SpO2: 96%   Weight: 68.9 kg (152 lb)   Height: 5' (1.524 m)            Physical Exam  Vitals and nursing note reviewed. Constitutional:       General: She is not in acute distress. Appearance: She is well-developed. She is not diaphoretic. HENT:      Head: Atraumatic. Neck:      Trachea: No tracheal deviation. Cardiovascular:      Comments: Warm and well perfused  Pulmonary:      Effort: Pulmonary effort is normal. No respiratory distress. Musculoskeletal:         General: Normal range of motion. Skin:     General: Skin is warm and dry. Neurological:      Mental Status: She is alert. Coordination: Coordination normal.   Psychiatric:         Behavior: Behavior normal.         Thought Content: Thought content normal.         Judgment: Judgment normal.          MDM     This is a 67-year-old female with past medical history, review of systems, physical exam as above, presenting with complaints of night sweats. Patient states approximately 2 months of night sweats, after she was discharged home following a difficult course and hospitalization. Per report, underwent surgery for extraction of staghorn calculus, with complication involving hemorrhage of the left kidney, requiring emergent transfusion, transferred to an operative center, where she embolization. Patient states she discussed her symptoms today with her primary care physician who referred her to the emergency department for further care and evaluation. Patient denies other symptoms, including fevers or chills, nausea or vomiting, chest pain or shortness of breath. She denies back pain, states she is producing urine without difficulty. Patient has a history of a provoked PE, no longer anticoagulated. Physical exam is remarkable for well-appearing elderly female, in no acute distress noted to be hypertensive, afebrile without tachycardia, satting well on room air.   Patient has been under the impression her symptoms have been due to hormonal changes, however is not experienced relief from treatment. Plan to obtain CMP, CBC, EKG, chest x-ray, cardiac enzymes, UA, lactic acid and blood cultures, D-dimer. Will consider further imaging, reassess, and make a disposition. Procedures    3:38 PM  Patient discussed with Dr. He Abad (general surgery) who advises fluid collection is likely hematoma postsurgical, especially given the lack of air in the fluid collection. Given reassuring lab work and vital signs, recommends discharge home, follow-up with her urologist, repeat imaging.

## 2022-04-14 NOTE — ED TRIAGE NOTES
Pt sent to ER from PCP to rule out a blood clot. Pt reports she has had night sweats since being d/c on 2/17/22 from the hospital. Pt reports she was admitted on 2/7/22 for a kidney stone that needed a stent. Pt reports that during the surgery her kidney began to hemorrhage and she required 8 units of blood.

## 2022-04-14 NOTE — PROGRESS NOTES
Samantha Son  74 y.o. female  1950  [id number]  [facility]  Progress Note     Encounter Date: 4/14/2022    Assessment and Plan:     Encounter Diagnoses     ICD-10-CM ICD-9-CM   1. Night sweats  R61 780.8   2. Insomnia, unspecified type  G47.00 780.52       1. Night sweats  Unclear etiology  Could be from infarction of kidney after embolization, infection, hormonal or thromboembolic disease. To ER for evaluation  Report called to Dr. Meredith Prado at [address] [place] [address] with recent surgery, probably needs eval for DVT PE depending on current renal function. 2. Insomnia, unspecified type  Refilled. - zolpidem (AMBIEN) 5 mg tablet; Take 1 Tablet by mouth nightly as needed for Sleep. Max Daily Amount: 5 mg. Indications: difficulty falling asleep  Dispense: 30 Tablet; Refill: 2      I have discussed the diagnosis with the patient and the intended plan as seen in the above orders. she has expressed understanding. The patient has received an after-visit summary and questions were answered concerning future plans. I have discussed medication side effects and warnings with the patient as well. Electronically Signed: Kayode Rincon MD    Current Medications after this visit     No current facility-administered medications for this visit. Current Outpatient Medications   Medication Sig    zolpidem (AMBIEN) 5 mg tablet Take 1 Tablet by mouth nightly as needed for Sleep. Max Daily Amount: 5 mg. Indications: difficulty falling asleep    metoprolol tartrate (LOPRESSOR) 25 mg tablet TAKE 1 TABLET BY MOUTH TWICE DAILY    nystatin (MYCOSTATIN) powder Apply  to affected area four (4) times daily.  alendronate (FOSAMAX) 70 mg tablet TAKE 1 TABLET BY MOUTH EVERY 7 DAYS    ezetimibe (ZETIA) 10 mg tablet TAKE 1 TABLET BY MOUTH DAILY    gemfibroziL (LOPID) 600 mg tablet TAKE 1 TABLET BY MOUTH TWICE DAILY    glycerin-min oil-polycarbophil (Replens) gel Use nightly. Topical use only.  Put thin layer in vulvar area.  REPATHA SURECLICK pen injection 626 mg Once every 2 weeks.  KRILL OIL PO Take 500 mg by mouth daily.  multivitamin (MULTIPLE VITAMINS PO) Take 1 Tab by mouth daily.  Lactobacillus acidophilus (ACIDOPHILUS PO) Take 1 Cap by mouth daily.  cholecalciferol, vitamin D3, 2,000 unit tab Take 2,000 Units by mouth daily.  turmeric (CURCUMIN) Take 500 mg by mouth daily.  acetaminophen (TYLENOL EXTRA STRENGTH PO) Take 500 mg by mouth as needed. Takes 2 po as needed for leg or back pain.  cetirizine (ZYRTEC) 10 mg tablet Take 10 mg by mouth daily. Medications Discontinued During This Encounter   Medication Reason    zolpidem (AMBIEN) 5 mg tablet REORDER     ~~~~~~~~~~~~~~~~~~~~~~~~~~~~~~~~~~~~~~~~~~~~~~~~~~~~~~~~~~~    No chief complaint on file. History provided by patient  History of Present Illness   Rory Gastelum is a 74 y.o. female who presents to clinic today for:  No chief complaint on file. Night sweats-patient reports the following:  Soaks her pajamas, has to get up and change them  Several weeks  Perhaps better the past three days   Started herself back on vaginal Estrace thinking the sx were perhaps hormonal   Recent surgeries  Admitted to [facility] 2/7/2022  Was having repetitive UTI's found to have staghorn calculus left kidney and had surgery for this at [address] by UROLOGY  Had subsequent hemorrhage and was transferred to [facility] where she had to have 8 units of blood, albumin and PRBC's. In ICU most of the hospitalization  Eventually had to have embolization of left renal arteries, was told it would probably cause loss of kidney although subsequently told had some residual function of kidney  Discharged 2/17/2022  Was thought to be doing well by UROLOGY two weeks post discharge  History of PE's in her 40's  During this hospitalization had hematoma from catheterization for embolization procedure.   Had dopplers at that time,  Does not think she had a chest CT during hospitalization. Insomnia  Needs refill of Ambien  No problems taking it   checked and is OK    Health Maintenance  Will do at future visit  Health Maintenance Due   Topic Date Due    COVID-19 Vaccine (3 - Booster for Moderna series) 08/31/2021     Review of Systems   Review of Systems   Constitutional: Positive for diaphoresis. Negative for chills and fever. Respiratory: Negative for shortness of breath. Cardiovascular: Negative for chest pain, palpitations and leg swelling. Gastrointestinal: Negative for abdominal pain and blood in stool. Genitourinary: Negative for dysuria and hematuria. Skin: Negative for rash. Psychiatric/Behavioral: Negative. Vitals/Objective:   137/79   P 85 RR 20  T 98  PO 96  .2  There is no height or weight on file to calculate BMI. Wt Readings from Last 3 Encounters:   04/14/22 152 lb (68.9 kg)   10/28/21 162 lb 12.8 oz (73.8 kg)   08/11/21 161 lb 9.6 oz (73.3 kg)         Objective  Physical Exam  Vitals and nursing note reviewed. Constitutional:       Appearance: Normal appearance. She is not toxic-appearing. HENT:      Head: Normocephalic and atraumatic. Cardiovascular:      Rate and Rhythm: Normal rate and regular rhythm. Heart sounds: Normal heart sounds. No murmur heard. No gallop. Comments: No lower extremity swelling or tenderness. Pulmonary:      Effort: Pulmonary effort is normal. No respiratory distress. Breath sounds: Normal breath sounds. No wheezing, rhonchi or rales. Abdominal:      General: Abdomen is flat. Palpations: Abdomen is soft. There is no mass. Tenderness: There is no abdominal tenderness. There is no right CVA tenderness, left CVA tenderness, guarding or rebound. Musculoskeletal:      Cervical back: Neck supple. No muscular tenderness. Right lower leg: No edema. Left lower leg: No edema. Lymphadenopathy:      Cervical: No cervical adenopathy. Neurological:      Mental Status: She is alert. Psychiatric:         Mood and Affect: Mood normal.         Behavior: Behavior normal.         Thought Content: Thought content normal.         Judgment: Judgment normal.       Labs from ER after this visit. Recent Results (from the past 24 hour(s))   CBC WITH AUTOMATED DIFF    Collection Time: 04/14/22 12:57 PM   Result Value Ref Range    WBC 10.4 3.6 - 11.0 K/uL    RBC 4.51 3.80 - 5.20 M/uL    HGB 12.2 11.5 - 16.0 g/dL    HCT 39.1 35.0 - 47.0 %    MCV 86.7 80.0 - 99.0 FL    MCH 27.1 26.0 - 34.0 PG    MCHC 31.2 30.0 - 36.5 g/dL    RDW 14.6 (H) 11.5 - 14.5 %    PLATELET 358 (H) 339 - 400 K/uL    MPV 11.1 8.9 - 12.9 FL    NRBC 0.0 0  WBC    ABSOLUTE NRBC 0.00 0.00 - 0.01 K/uL    NEUTROPHILS 51 32 - 75 %    LYMPHOCYTES 38 12 - 49 %    MONOCYTES 6 5 - 13 %    EOSINOPHILS 4 0 - 7 %    BASOPHILS 1 0 - 1 %    IMMATURE GRANULOCYTES 0 0.0 - 0.5 %    ABS. NEUTROPHILS 5.4 1.8 - 8.0 K/UL    ABS. LYMPHOCYTES 3.9 (H) 0.8 - 3.5 K/UL    ABS. MONOCYTES 0.7 0.0 - 1.0 K/UL    ABS. EOSINOPHILS 0.4 0.0 - 0.4 K/UL    ABS. BASOPHILS 0.1 0.0 - 0.1 K/UL    ABS. IMM. GRANS. 0.0 0.00 - 0.04 K/UL    DF AUTOMATED     METABOLIC PANEL, COMPREHENSIVE    Collection Time: 04/14/22 12:57 PM   Result Value Ref Range    Sodium 138 136 - 145 mmol/L    Potassium 4.3 3.5 - 5.1 mmol/L    Chloride 105 97 - 108 mmol/L    CO2 26 21 - 32 mmol/L    Anion gap 7 5 - 15 mmol/L    Glucose 92 65 - 100 mg/dL    BUN 22 (H) 6 - 20 MG/DL    Creatinine 1.16 (H) 0.55 - 1.02 MG/DL    BUN/Creatinine ratio 19 12 - 20      GFR est AA 55 (L) >60 ml/min/1.73m2    GFR est non-AA 46 (L) >60 ml/min/1.73m2    Calcium 10.3 (H) 8.5 - 10.1 MG/DL    Bilirubin, total 0.4 0.2 - 1.0 MG/DL    ALT (SGPT) 13 12 - 78 U/L    AST (SGOT) 23 15 - 37 U/L    Alk.  phosphatase 144 (H) 45 - 117 U/L    Protein, total 8.8 (H) 6.4 - 8.2 g/dL    Albumin 3.6 3.5 - 5.0 g/dL    Globulin 5.2 (H) 2.0 - 4.0 g/dL    A-G Ratio 0.7 (L) 1.1 - 2.2 TROPONIN-HIGH SENSITIVITY    Collection Time: 04/14/22 12:57 PM   Result Value Ref Range    Troponin-High Sensitivity 4 0 - 51 ng/L   D DIMER    Collection Time: 04/14/22 12:57 PM   Result Value Ref Range    D-dimer 4.38 (H) 0.00 - 0.65 mg/L FEU   LACTIC ACID    Collection Time: 04/14/22 12:57 PM   Result Value Ref Range    Lactic acid 0.8 0.4 - 2.0 MMOL/L   SAMPLES BEING HELD    Collection Time: 04/14/22 12:57 PM   Result Value Ref Range    SAMPLES BEING HELD SST. RED     COMMENT        Add-on orders for these samples will be processed based on acceptable specimen integrity and analyte stability, which may vary by analyte. URINALYSIS W/MICROSCOPIC    Collection Time: 04/14/22  2:07 PM   Result Value Ref Range    Color YELLOW/STRAW      Appearance CLEAR CLEAR      Specific gravity 1.016 1.003 - 1.030      pH (UA) 5.5 5.0 - 8.0      Protein Negative NEG mg/dL    Glucose Negative NEG mg/dL    Ketone Negative NEG mg/dL    Bilirubin Negative NEG      Blood Negative NEG      Urobilinogen 0.2 0.2 - 1.0 EU/dL    Nitrites Negative NEG      Leukocyte Esterase Negative NEG      WBC 0-4 0 - 4 /hpf    RBC 0-5 0 - 5 /hpf    Epithelial cells MODERATE (A) FEW /lpf    Bacteria Negative NEG /hpf    Hyaline cast 0-2 0 - 2 /lpf   URINE CULTURE HOLD SAMPLE    Collection Time: 04/14/22  2:07 PM    Specimen: Serum; Urine   Result Value Ref Range    Urine culture hold        Urine on hold in Microbiology dept for 2 days. If unpreserved urine is submitted, it cannot be used for addtional testing after 24 hours, recollection will be required. Disposition     No future appointments. History   Patient's past medical, surgical and family histories were reviewed and updated.     Past Medical History:   Diagnosis Date    Aneurysm [facility])     splenic artery aneurysm - no longer needs to be followed up - will never be a problem    Arthritis     back    Chronic pain     back    CKD (chronic kidney disease) stage 2, GFR 60-89 ml/min  COVID-19     History of vascular access device 12/28/2020    4 Fr midilne, 10 cm L basilic, poor access    Hyperlipidemia     Hypertension     Ill-defined condition     walks with a cane    Ill-defined condition     cardiac calcium test - some build up/stress test is \"fine\" per pt    Lumbar stenosis     Migraine     Osteoporosis     By DEXA 2019    Other ill-defined conditions(799.89)     wheezing with allergies    Other ill-defined conditions(799.89)     GI bleed - stomach - NSAID use    Overactive bladder     PUD (peptic ulcer disease)     Stenosis of lumbosacral spine     Thromboembolus ([address]     PE     Past Surgical History:   Procedure Laterality Date    COLONOSCOPY N/A 10/1/2018    [facility] at [facility]COPY    HX HYSTERECTOMY      HX ORTHOPAEDIC  2009    spinal fusion/has not had a lumbar laminectomy    HX ORTHOPAEDIC Bilateral     bunionectomy - bilateral once and unilateral once    HX ORTHOPAEDIC Bilateral     carpal tunnel release    HX TONSILLECTOMY      T & A    HX UROLOGICAL  2012    bladder repair     Family History   Problem Relation Age of Onset    Cancer Father         bladder    Cancer Brother         BCCA    Stroke Mother     Cancer Maternal Uncle         leukemia    Breast Cancer Paternal Aunt     Stroke Maternal Grandmother     Colon Cancer Paternal Grandfather     Cancer Maternal Uncle         throat     Social History     Tobacco Use    Smoking status: Never Smoker    Smokeless tobacco: Never Used   Vaping Use    Vaping Use: Never used   Substance Use Topics    Alcohol use: Not Currently     Comment: very, very rare    Drug use: Never       Allergies     Allergies   Allergen Reactions    Ciprofloxacin Unknown (comments)     Joint inflammation      Ketorolac Unable to Obtain    Nitrofurantoin Other (comments)     LOW WBC - 2000    Nitrofurantoin Macrocrystal Unable to Obtain    Nsaids (Non-Steroidal Anti-Inflammatory Drug) Other (comments)     GI bleed.  Other Plant, Animal, Environmental Other (comments)     Mold, dust, cats, dog allergies. ENVIRONMENTAL ALLERGIES.     Statins-Hmg-Coa Reductase Inhibitors Other (comments)     LFT increases    Tolmetin Unknown (comments)    Toradol [Ketorolac Tromethamine] Rash

## 2022-04-16 LAB
ATRIAL RATE: 77 BPM
CALCULATED P AXIS, ECG09: 1 DEGREES
CALCULATED R AXIS, ECG10: -18 DEGREES
CALCULATED T AXIS, ECG11: -3 DEGREES
DIAGNOSIS, 93000: NORMAL
P-R INTERVAL, ECG05: 180 MS
Q-T INTERVAL, ECG07: 388 MS
QRS DURATION, ECG06: 78 MS
QTC CALCULATION (BEZET), ECG08: 439 MS
VENTRICULAR RATE, ECG03: 77 BPM

## 2022-04-20 LAB
BACTERIA SPEC CULT: NORMAL
SERVICE CMNT-IMP: NORMAL

## 2022-04-28 RX ORDER — GEMFIBROZIL 600 MG/1
600 TABLET, FILM COATED ORAL 2 TIMES DAILY
Qty: 180 TABLET | Refills: 0 | Status: SHIPPED | OUTPATIENT
Start: 2022-04-28 | End: 2022-08-14

## 2022-04-28 NOTE — TELEPHONE ENCOUNTER
PCP: Meseret Guevara MD    Last appt: 4/14/2022  No future appointments. Requested Prescriptions     Pending Prescriptions Disp Refills    gemfibroziL (LOPID) 600 mg tablet 180 Tablet 1     Sig: Take 1 Tablet by mouth two (2) times a day.        Prior labs and Blood pressures:  BP Readings from Last 3 Encounters:   04/14/22 131/80   10/28/21 114/75   08/11/21 131/84     Lab Results   Component Value Date/Time    Sodium 138 04/14/2022 12:57 PM    Potassium 4.3 04/14/2022 12:57 PM    Chloride 105 04/14/2022 12:57 PM    CO2 26 04/14/2022 12:57 PM    Anion gap 7 04/14/2022 12:57 PM    Glucose 92 04/14/2022 12:57 PM    BUN 22 (H) 04/14/2022 12:57 PM    Creatinine 1.16 (H) 04/14/2022 12:57 PM    BUN/Creatinine ratio 19 04/14/2022 12:57 PM    GFR est AA 55 (L) 04/14/2022 12:57 PM    GFR est non-AA 46 (L) 04/14/2022 12:57 PM    Calcium 10.3 (H) 04/14/2022 12:57 PM     Lab Results   Component Value Date/Time    Hemoglobin A1c 5.6 10/30/2020 02:30 PM     Lab Results   Component Value Date/Time    Cholesterol, total 138 12/20/2021 01:46 PM    HDL Cholesterol 83 12/20/2021 01:46 PM    LDL, calculated 44.2 12/20/2021 01:46 PM    VLDL, calculated 10.8 12/20/2021 01:46 PM    Triglyceride 54 12/20/2021 01:46 PM    CHOL/HDL Ratio 1.7 12/20/2021 01:46 PM     Lab Results   Component Value Date/Time    Vitamin D 25-Hydroxy 39.3 12/31/2020 01:59 PM       Lab Results   Component Value Date/Time    TSH 1.860 09/18/2019 09:33 AM

## 2022-06-02 RX ORDER — EZETIMIBE 10 MG/1
TABLET ORAL
Qty: 90 TABLET | Refills: 1 | Status: SHIPPED | OUTPATIENT
Start: 2022-06-02

## 2022-07-18 ENCOUNTER — OFFICE VISIT (OUTPATIENT)
Dept: FAMILY MEDICINE CLINIC | Age: 72
End: 2022-07-18
Payer: MEDICARE

## 2022-07-18 VITALS
WEIGHT: 157 LBS | SYSTOLIC BLOOD PRESSURE: 125 MMHG | HEIGHT: 60 IN | RESPIRATION RATE: 16 BRPM | TEMPERATURE: 97.8 F | DIASTOLIC BLOOD PRESSURE: 80 MMHG | OXYGEN SATURATION: 95 % | HEART RATE: 66 BPM | BODY MASS INDEX: 30.82 KG/M2

## 2022-07-18 DIAGNOSIS — E78.5 HYPERLIPIDEMIA, UNSPECIFIED HYPERLIPIDEMIA TYPE: ICD-10-CM

## 2022-07-18 DIAGNOSIS — R63.5 WEIGHT GAIN: ICD-10-CM

## 2022-07-18 DIAGNOSIS — G43.909 MIGRAINE WITHOUT STATUS MIGRAINOSUS, NOT INTRACTABLE, UNSPECIFIED MIGRAINE TYPE: ICD-10-CM

## 2022-07-18 DIAGNOSIS — R61 NIGHT SWEATS: ICD-10-CM

## 2022-07-18 DIAGNOSIS — I10 ESSENTIAL HYPERTENSION: Primary | ICD-10-CM

## 2022-07-18 DIAGNOSIS — D64.9 ANEMIA, UNSPECIFIED TYPE: ICD-10-CM

## 2022-07-18 DIAGNOSIS — G47.00 INSOMNIA, UNSPECIFIED TYPE: ICD-10-CM

## 2022-07-18 PROCEDURE — G9899 SCRN MAM PERF RSLTS DOC: HCPCS | Performed by: NURSE PRACTITIONER

## 2022-07-18 PROCEDURE — 3017F COLORECTAL CA SCREEN DOC REV: CPT | Performed by: NURSE PRACTITIONER

## 2022-07-18 PROCEDURE — 99214 OFFICE O/P EST MOD 30 MIN: CPT | Performed by: NURSE PRACTITIONER

## 2022-07-18 PROCEDURE — 1090F PRES/ABSN URINE INCON ASSESS: CPT | Performed by: NURSE PRACTITIONER

## 2022-07-18 PROCEDURE — G8417 CALC BMI ABV UP PARAM F/U: HCPCS | Performed by: NURSE PRACTITIONER

## 2022-07-18 PROCEDURE — G8752 SYS BP LESS 140: HCPCS | Performed by: NURSE PRACTITIONER

## 2022-07-18 PROCEDURE — 1101F PT FALLS ASSESS-DOCD LE1/YR: CPT | Performed by: NURSE PRACTITIONER

## 2022-07-18 PROCEDURE — 1123F ACP DISCUSS/DSCN MKR DOCD: CPT | Performed by: NURSE PRACTITIONER

## 2022-07-18 PROCEDURE — G8536 NO DOC ELDER MAL SCRN: HCPCS | Performed by: NURSE PRACTITIONER

## 2022-07-18 PROCEDURE — G8432 DEP SCR NOT DOC, RNG: HCPCS | Performed by: NURSE PRACTITIONER

## 2022-07-18 PROCEDURE — G8427 DOCREV CUR MEDS BY ELIG CLIN: HCPCS | Performed by: NURSE PRACTITIONER

## 2022-07-18 PROCEDURE — G8754 DIAS BP LESS 90: HCPCS | Performed by: NURSE PRACTITIONER

## 2022-07-18 RX ORDER — RIZATRIPTAN BENZOATE 10 MG/1
10 TABLET, ORALLY DISINTEGRATING ORAL
Qty: 30 TABLET | Refills: 1 | Status: SHIPPED | OUTPATIENT
Start: 2022-07-18 | End: 2022-07-18

## 2022-07-18 RX ORDER — ZOLPIDEM TARTRATE 5 MG/1
5 TABLET ORAL
Qty: 30 TABLET | Refills: 2 | Status: SHIPPED | OUTPATIENT
Start: 2022-07-18 | End: 2022-10-18 | Stop reason: SDUPTHER

## 2022-07-18 NOTE — PROGRESS NOTES
Yoko Wolfe is a 74 y.o. female who presents to clinic today for the following:    Chief Complaint   Patient presents with    Medication Refill    Weight Loss   [facility] Follow Up       Vitals:    07/18/22 0921   BP: 125/80   Pulse: 66   Resp: 16   Temp: 97.8 °F (36.6 °C)   TempSrc: Temporal   SpO2: 95%   Weight: 157 lb (71.2 kg)   Height: 5' (1.524 m)       Body mass index is 30.66 kg/m². Patients past medical, surgical and family histories were reviewed. Allergies and Medications reviewed and updated. Current Outpatient Medications:     zolpidem (AMBIEN) 5 mg tablet, Take 1 Tablet by mouth nightly as needed for Sleep. Max Daily Amount: 5 mg. Indications: difficulty falling asleep, Disp: 30 Tablet, Rfl: 2    rizatriptan (MAXALT-MLT) 10 mg disintegrating tablet, Take 1 Tablet by mouth once as needed for Migraine for up to 1 dose. Indications: a migraine headache, Disp: 30 Tablet, Rfl: 1    ezetimibe (ZETIA) 10 mg tablet, TAKE 1 TABLET BY MOUTH DAILY, Disp: 90 Tablet, Rfl: 1    gemfibroziL (LOPID) 600 mg tablet, Take 1 Tablet by mouth two (2) times a day., Disp: 180 Tablet, Rfl: 0    metoprolol tartrate (LOPRESSOR) 25 mg tablet, TAKE 1 TABLET BY MOUTH TWICE DAILY, Disp: 180 Tablet, Rfl: 1    nystatin (MYCOSTATIN) powder, Apply  to affected area four (4) times daily. , Disp: 60 g, Rfl: 2    alendronate (FOSAMAX) 70 mg tablet, TAKE 1 TABLET BY MOUTH EVERY 7 DAYS, Disp: 12 Tablet, Rfl: 1    glycerin-min oil-polycarbophil (Replens) gel, Use nightly. Topical use only. Put thin layer in vulvar area., Disp: 35 g, Rfl: 0    REPATHA SURECLICK pen injection, 305 mg Once every 2 weeks. , Disp: , Rfl: 6    KRILL OIL PO, Take 500 mg by mouth daily. , Disp: , Rfl:     multivitamin (MULTIPLE VITAMINS PO), Take 1 Tab by mouth daily. , Disp: , Rfl:     Lactobacillus acidophilus (ACIDOPHILUS PO), Take 1 Cap by mouth daily. , Disp: , Rfl:     cholecalciferol, vitamin D3, 2,000 unit tab, Take 2,000 Units by mouth daily. , Disp: , Rfl:     turmeric (CURCUMIN), Take 500 mg by mouth daily. , Disp: , Rfl:     acetaminophen (TYLENOL EXTRA STRENGTH PO), Take 500 mg by mouth as needed. Takes 2 po as needed for leg or back pain. , Disp: , Rfl:     cetirizine (ZYRTEC) 10 mg tablet, Take 10 mg by mouth daily. , Disp: , Rfl:     Allergies   Allergen Reactions    Ciprofloxacin Unknown (comments)     Joint inflammation      Ketorolac Unable to Obtain    Nitrofurantoin Other (comments)     LOW WBC - 2000    Nitrofurantoin Macrocrystal Unable to Obtain    Nsaids (Non-Steroidal Anti-Inflammatory Drug) Other (comments)     GI bleed.  Other Plant, Animal, Environmental Other (comments)     Mold, dust, cats, dog allergies. ENVIRONMENTAL ALLERGIES.  Statins-Hmg-Coa Reductase Inhibitors Other (comments)     LFT increases    Tolmetin Unknown (comments)    Toradol [Ketorolac Tromethamine] Rash       Past Medical History:   Diagnosis Date    Aneurysm ([facility] [address]     splenic artery aneurysm - no longer needs to be followed up - will never be a problem    Chronic pain     back    CKD (chronic kidney disease) stage 3, GFR 30-59 ml/min ([facility])     s/p embolization or left renal artery for bleeding after staghorn calculus surgery 2/2022    COVID-19     Hypoxic respiratory failure. Hospitalized 12/26/2020-12/29/2020.     Gastrointestinal bleeding     GI bleed - stomach - NSAID use    History of vascular access device 12/28/2020    4 Fr midilne, 10 cm L basilic, poor access    Hyperlipidemia     Hypertension     Migraine     Osteoporosis     By DEXA 2019    Other ill-defined conditions(799.89)     wheezing with allergies    Overactive bladder     PUD (peptic ulcer disease)     Spinal stenosis of lumbar region     Thromboembolus [facility])     PE's in her 42's       Past Surgical History:   Procedure Laterality Date    COLONOSCOPY N/A 10/1/2018    COLONOSCOPY performed by Marely Ferrell MD at [address]   HX ORTHOPAEDIC  2009    spinal fusion/has not had a lumbar laminectomy    HX ORTHOPAEDIC Bilateral     bunionectomy - bilateral once and unilateral once    HX ORTHOPAEDIC Bilateral     carpal tunnel release    HX TONSILLECTOMY      T & A    HX UROLOGICAL  2012    bladder repair    HX UROLOGICAL  02/07/2022 2/7/2022 removal staghorn calculus. Subsequent hemorrhage requiring transfusion and embolization of left renal arteries.        Family History   Problem Relation Age of Onset    Cancer Father         bladder    Cancer Brother         BCCA    Stroke Mother     Cancer Maternal Uncle         leukemia    Breast Cancer Paternal Aunt     Stroke Maternal Grandmother     Colon Cancer Paternal Grandfather     Cancer Maternal Uncle         throat       Social History     Socioeconomic History    Marital status:      Spouse name: Not on file    Number of children: Not on file    Years of education: Not on file    Highest education level: Not on file   Occupational History    Not on file   Tobacco Use    Smoking status: Never Smoker    Smokeless tobacco: Never Used   Vaping Use    Vaping Use: Never used   Substance and Sexual Activity    Alcohol use: Not Currently     Comment: very, very rare    Drug use: Never    Sexual activity: Yes     Partners: Male   Other Topics Concern     Service Not Asked    Blood Transfusions Not Asked    Caffeine Concern Not Asked    Occupational Exposure Not Asked    Hobby Hazards Not Asked    Sleep Concern Not Asked    Stress Concern Not Asked    Weight Concern Not Asked    Special Diet Not Asked    Back Care Not Asked    Exercise Not Asked    Bike Helmet Not Asked   [address] Not Asked    Self-Exams Not Asked   Social History Narrative    Not on file     Social Determinants of Health     Financial Resource Strain:     Difficulty of Paying Living Expenses: Not on file   Food Insecurity:     Worried About Running Out of Food in the Last Year: Not on file    Ran Out of Food in the Last Year: Not on file   Transportation Needs:     Lack of Transportation (Medical): Not on file    Lack of Transportation (Non-Medical): Not on file   Physical Activity:     Days of Exercise per Week: Not on file    Minutes of Exercise per Session: Not on file   Stress:     Feeling of Stress : Not on file   Social Connections:     Frequency of Communication with Friends and Family: Not on file    Frequency of Social Gatherings with Friends and Family: Not on file    Attends Buddhist Services: Not on file    Active Member of [address] [place] [address] [employer] or Organizations: Not on file    Attends Club or Organization Meetings: Not on file    Marital Status: Not on file   Intimate Partner Violence:     Fear of Current or Ex-Partner: Not on file    Emotionally Abused: Not on file    Physically Abused: Not on file    Sexually Abused: Not on file   Housing Stability:     Unable to Pay for Housing in the Last Year: Not on file    Number of [place] in the Last Year: Not on file    Unstable Housing in the Last Year: Not on file           Physical Exam  Vitals and nursing note reviewed. Constitutional:       General: She is not in acute distress. Appearance: Normal appearance. HENT:      Head: Normocephalic and atraumatic. Mouth/Throat:      Mouth: Mucous membranes are moist.   Eyes:      Pupils: Pupils are equal, round, and reactive to light. Cardiovascular:      Rate and Rhythm: Normal rate and regular rhythm. Pulses: Normal pulses. Heart sounds: Normal heart sounds. Pulmonary:      Effort: Pulmonary effort is normal.      Breath sounds: Normal breath sounds. Musculoskeletal:         General: Normal range of motion. Cervical back: Normal range of motion. Skin:     General: Skin is warm and dry. Capillary Refill: Capillary refill takes less than 2 seconds. Neurological:      General: No focal deficit present.       Mental Status: She is alert and oriented to person, place, and time. Mental status is at baseline. Psychiatric:         Mood and Affect: Mood normal.         Behavior: Behavior normal.          Patient was seen in office for medication refill, blood pressure evaluation, anxiety, insomnia. Patient was in need of follow-up labs. He has been ordered. We will follow-up with results when they return. Review of Systems   Constitutional: Negative for fever and malaise/fatigue. HENT: Negative for congestion, sinus pain and sore throat. Respiratory: Negative for cough and shortness of breath. Cardiovascular: Negative for chest pain. Gastrointestinal: Negative for diarrhea, nausea and vomiting. Genitourinary: Negative for dysuria. Musculoskeletal: Negative. Neurological: Negative for dizziness and headaches. Endo/Heme/Allergies: Negative for environmental allergies. Psychiatric/Behavioral: The patient is nervous/anxious and has insomnia. No results found. No results found for this or any previous visit (from the past 24 hour(s)). Assessment and Plan:    Encounter Diagnoses   Name Primary?  Essential hypertension Yes    Insomnia, unspecified type     Weight gain     Migraine without status migrainosus, not intractable, unspecified migraine type     Night sweats     Hyperlipidemia, unspecified hyperlipidemia type     Anemia, unspecified type                 I have discussed the diagnosis with the patient and the intended plan as seen in the above orders. she has expressed understanding. The patient has received an after-visit summary and questions were answered concerning future plans. I have discussed medication side effects and warnings with the patient as well. Follow-up and Dispositions    · Return if symptoms worsen or fail to improve.          Electronically Signed: Yuli Gabriel NP

## 2022-07-18 NOTE — PATIENT INSTRUCTIONS
Take medication as prescribed, we will follow up with labs when they return     DASH Diet: Care Instructions  Your Care Instructions     The DASH diet is an eating plan that can help lower your blood pressure. DASH stands for Dietary Approaches to Stop Hypertension. Hypertension is high blood pressure. The DASH diet focuses on eating foods that are high in calcium, potassium, and magnesium. These nutrients can lower blood pressure. The foods that are highest in these nutrients are fruits, vegetables, low-fat dairy products, nuts, seeds, and legumes. But taking calcium, potassium, and magnesium supplements instead of eating foods that are high in those nutrients does not have the same effect. The DASH diet also includes whole grains, fish, and poultry. The DASH diet is one of several lifestyle changes your doctor may recommend to lower your high blood pressure. Your doctor may also want you to decrease the amount of sodium in your diet. Lowering sodium while following the DASH diet can lower blood pressure even further than just the DASH diet alone. Follow-up care is a key part of your treatment and safety. Be sure to make and go to all appointments, and call your doctor if you are having problems. It's also a good idea to know your test results and keep a list of the medicines you take. How can you care for yourself at home? Following the DASH diet  · Eat 4 to 5 servings of fruit each day. A serving is 1 medium-sized piece of fruit, ½ cup chopped or canned fruit, 1/4 cup dried fruit, or 4 ounces (½ cup) of fruit juice. Choose fruit more often than fruit juice. · Eat 4 to 5 servings of vegetables each day. A serving is 1 cup of lettuce or raw leafy vegetables, ½ cup of chopped or cooked vegetables, or 4 ounces (½ cup) of vegetable juice. Choose vegetables more often than vegetable juice. · Get 2 to 3 servings of low-fat and fat-free dairy each day.  A serving is 8 ounces of milk, 1 cup of yogurt, or 1 ½ ounces of cheese. · Eat 6 to 8 servings of grains each day. A serving is 1 slice of bread, 1 ounce of dry cereal, or ½ cup of cooked rice, pasta, or cooked cereal. Try to choose whole-grain products as much as possible. · Limit lean meat, poultry, and fish to 2 servings each day. A serving is 3 ounces, about the size of a deck of cards. · Eat 4 to 5 servings of nuts, seeds, and legumes (cooked dried beans, lentils, and split peas) each week. A serving is 1/3 cup of nuts, 2 tablespoons of seeds, or ½ cup of cooked beans or peas. · Limit fats and oils to 2 to 3 servings each day. A serving is 1 teaspoon of vegetable oil or 2 tablespoons of salad dressing. · Limit sweets and added sugars to 5 servings or less a week. A serving is 1 tablespoon jelly or jam, ½ cup sorbet, or 1 cup of lemonade. · Eat less than 2,300 milligrams (mg) of sodium a day. If you limit your sodium to 1,500 mg a day, you can lower your blood pressure even more. · Be aware that all of these are the suggested number of servings for people who eat 1,800 to 2,000 calories a day. Your recommended number of servings may be different if you need more or fewer calories. Tips for success  · Start small. Do not try to make dramatic changes to your diet all at once. You might feel that you are missing out on your favorite foods and then be more likely to not follow the plan. Make small changes, and stick with them. Once those changes become habit, add a few more changes. · Try some of the following:  ? Make it a goal to eat a fruit or vegetable at every meal and at snacks. This will make it easy to get the recommended amount of fruits and vegetables each day. ? Try yogurt topped with fruit and nuts for a snack or healthy dessert. ? Add lettuce, tomato, cucumber, and onion to sandwiches. ? Combine a ready-made pizza crust with low-fat mozzarella cheese and lots of vegetable toppings.  Try using tomatoes, squash, spinach, broccoli, carrots, cauliflower, and onions. ? Have a variety of cut-up vegetables with a low-fat dip as an appetizer instead of chips and dip. ? Sprinkle sunflower seeds or chopped almonds over salads. Or try adding chopped walnuts or almonds to cooked vegetables. ? Try some vegetarian meals using beans and peas. Add garbanzo or kidney beans to salads. Make burritos and tacos with mashed ojeda beans or black beans. Where can you learn more? Go to http://www.skelton.com/  Enter H967 in the search box to learn more about \"DASH Diet: Care Instructions. \"  Current as of: January 10, 2022               Content Version: 13.2  © 2006-2022 [employer]. Care instructions adapted under license by [employer] (which disclaims liability or warranty for this information). If you have questions about a medical condition or this instruction, always ask your healthcare professional. [place] [address] any warranty or liability for your use of this information. High Cholesterol: Care Instructions  Overview     Cholesterol is a type of fat in your blood. It is needed for many body functions, such as making new cells. Cholesterol is made by your body. It also comes from food you eat. High cholesterol means that you have too much of the fat in your blood. This raises your risk of a heart attack and stroke. LDL and HDL are part of your total cholesterol. LDL is the \"bad\" cholesterol. High LDL can raise your risk for coronary artery disease, heart attack, and stroke. HDL is the \"good\" cholesterol. It helps clear bad cholesterol from the body. High HDL is linked with a lower risk of coronary artery disease, heart attack, and stroke. Your cholesterol levels help your doctor find out your risk for having a heart attack or stroke. You and your doctor can talk about whether you need to lower your risk and what treatment is best for you.   Treatment options include a heart-healthy lifestyle and medicine. Both options can help lower your cholesterol and your risk. The way you choose to lower your risk will depend on how high your risk is for heart attack and stroke. It will also depend on how you feel about taking medicines. Follow-up care is a key part of your treatment and safety. Be sure to make and go to all appointments, and call your doctor if you are having problems. It's also a good idea to know your test results and keep a list of the medicines you take. How can you care for yourself at home? · Eat heart-healthy foods. ? Eat fruits, vegetables, whole grains, beans, and other high-fiber foods. ? Eat lean proteins, such as seafood, lean meats, beans, nuts, and soy products. ? Eat healthy fats, such as canola and olive oil. ? Choose foods that are low in saturated fat. ? Limit sodium and alcohol. ? Limit drinks and foods with added sugar. · Be physically active. Try to do moderate activity at least 2½ hours a week. Or try to do vigorous activity at least 1¼ hours a week. You may want to walk or try other activities, such as running, swimming, cycling, or playing tennis or team sports. · Stay at a healthy weight or lose weight by making the changes in eating and physical activity listed above. Losing just a small amount of weight, even 5 to 10 pounds, can help reduce your risk for having a heart attack or stroke. · Do not smoke. · Manage other health problems. These include diabetes and high blood pressure. If you think you may have a problem with alcohol or drug use, talk to your doctor. · If you take medicine, take it exactly as prescribed. Call your doctor if you think you are having a problem with your medicine. · Check with your doctor or pharmacist before you use any other medicines, including over-the-counter medicines. Make sure your doctor knows all of the medicines, vitamins, herbal products, and supplements you take. Taking some medicines together can cause problems.   When should you call for help? Watch closely for changes in your health, and be sure to contact your doctor if:    · You need help making lifestyle changes.     · You have questions about your medicine. Where can you learn more? Go to http://www.gray.com/  Enter E781 in the search box to learn more about \"High Cholesterol: Care Instructions. \"  Current as of: January 10, 2022               Content Version: 13.2  © 2006-2022 [employer]. Care instructions adapted under license by [employer] (which disclaims liability or warranty for this information). If you have questions about a medical condition or this instruction, always ask your healthcare professional. [address] any warranty or liability for your use of this information. High Blood Pressure: Care Instructions  Overview     It's normal for blood pressure to go up and down throughout the day. But if it stays up, you have high blood pressure. Another name for high blood pressure is hypertension. Despite what a lot of people think, high blood pressure usually doesn't cause headaches or make you feel dizzy or lightheaded. It usually has no symptoms. But it does increase your risk of stroke, heart attack, and other problems. You and your doctor will talk about your risks of these problems based on your blood pressure. Your doctor will give you a goal for your blood pressure. Your goal will be based on your health and your age. Lifestyle changes, such as eating healthy and being active, are always important to help lower blood pressure. You might also take medicine to reach your blood pressure goal.  Follow-up care is a key part of your treatment and safety. Be sure to make and go to all appointments, and call your doctor if you are having problems. It's also a good idea to know your test results and keep a list of the medicines you take.   How can you care for yourself at home?  Medical treatment  · If you stop taking your medicine, your blood pressure will go back up. You may take one or more types of medicine to lower your blood pressure. Be safe with medicines. Take your medicine exactly as prescribed. Call your doctor if you think you are having a problem with your medicine. · Talk to your doctor before you start taking aspirin every day. Aspirin can help certain people lower their risk of a heart attack or stroke. But taking aspirin isn't right for everyone, because it can cause serious bleeding. · See your doctor regularly. You may need to see the doctor more often at first or until your blood pressure comes down. · If you are taking blood pressure medicine, talk to your doctor before you take decongestants or anti-inflammatory medicine, such as ibuprofen. Some of these medicines can raise blood pressure. · Learn how to check your blood pressure at home. Lifestyle changes  · Stay at a healthy weight. This is especially important if you put on weight around the waist. Losing even 10 pounds can help you lower your blood pressure. · If your doctor recommends it, get more exercise. Walking is a good choice. Bit by bit, increase the amount you walk every day. Try for at least 30 minutes on most days of the week. You also may want to swim, bike, or do other activities. · Avoid or limit alcohol. Talk to your doctor about whether you can drink any alcohol. · Try to limit how much sodium you eat to less than 2,300 milligrams (mg) a day. Your doctor may ask you to try to eat less than 1,500 mg a day. · Eat plenty of fruits (such as bananas and oranges), vegetables, legumes, whole grains, and low-fat dairy products. · Lower the amount of saturated fat in your diet. Saturated fat is found in animal products such as milk, cheese, and meat. Limiting these foods may help you lose weight and also lower your risk for heart disease. · Do not smoke.  Smoking increases your risk for heart attack and stroke. If you need help quitting, talk to your doctor about stop-smoking programs and medicines. These can increase your chances of quitting for good. When should you call for help? Call 911  anytime you think you may need emergency care. This may mean having symptoms that suggest that your blood pressure is causing a serious heart or blood vessel problem. Your blood pressure may be over 180/120. For example, call 911 if:    · You have symptoms of a heart attack. These may include:  ? Chest pain or pressure, or a strange feeling in the chest.  ? Sweating. ? Shortness of breath. ? Nausea or vomiting. ? Pain, pressure, or a strange feeling in the back, neck, jaw, or upper belly or in one or both shoulders or arms. ? Lightheadedness or sudden weakness. ? A fast or irregular heartbeat.     · You have symptoms of a stroke. These may include:  ? Sudden numbness, tingling, weakness, or loss of movement in your face, arm, or leg, especially on only one side of your body. ? Sudden vision changes. ? Sudden trouble speaking. ? Sudden confusion or trouble understanding simple statements. ? Sudden problems with walking or balance. ? A sudden, severe headache that is different from past headaches.     · You have severe back or belly pain. Do not wait until your blood pressure comes down on its own. Get help right away. Call your doctor now or seek immediate care if:    · Your blood pressure is much higher than normal (such as 180/120 or higher), but you don't have symptoms.     · You think high blood pressure is causing symptoms, such as:  ? Severe headache.  ? Blurry vision. Watch closely for changes in your health, and be sure to contact your doctor if:    · Your blood pressure measures higher than your doctor recommends at least 2 times.  That means the top number is higher or the bottom number is higher, or both.     · You think you may be having side effects from your blood pressure medicine. Where can you learn more? Go to http://www.gray.com/  Enter [id number] in the search box to learn more about \"High Blood Pressure: Care Instructions. \"  Current as of: January 10, 2022               Content Version: 13.2  © 2006-2022 [employer]. Care instructions adapted under license by [employer] (which disclaims liability or warranty for this information). If you have questions about a medical condition or this instruction, always ask your healthcare professional. [place] [address] any warranty or liability for your use of this information. Insomnia: Care Instructions  Overview     Insomnia is the inability to sleep well. Insomnia may make it hard for you to get to sleep, stay asleep, or sleep as long as you need to. This can make you tired and grouchy during the day. It can also make you forgetful, less effective at work, and unhappy. Insomnia can be linked to many things. These include health problems, medicines, and stressful events. Treatment may include treating problems that may be linked with your insomnia. Treatment also includes behavior and lifestyle changes. This may include cognitive-behavioral therapy for insomnia (CBT-I). CBT-I uses different ways to help you change your thoughts and behaviors that may interfere with sleep. Your doctor can recommend specific things you can try. Examples include doing relaxation exercises, keeping regular bedtimes and wake times, limiting alcohol, and making healthy sleep habits. Some people decide to take medicine for a while to help with sleep. Follow-up care is a key part of your treatment and safety. Be sure to make and go to all appointments, and call your doctor if you are having problems. It's also a good idea to know your test results and keep a list of the medicines you take. How can you care for yourself at home?   Cognitive-behavioral therapy for insomnia (CBT-I)  · If your doctor recommends CBT-I, follow your treatment plan. Your doctor will give you instructions that are unique for you. · Your plan will likely include a few things that you can try at home. For example:  ? Try meditation or other relaxation techniques before you go to bed. ? Go to bed at the same time every night, and wake up at the same time every morning. Do not take naps during the day. ? Do not stay in bed awake for too long. If you can't fall asleep, or if you wake up in the middle of the night and can't get back to sleep within about 15 to 20 minutes, get out of bed and go to another room until you feel sleepy. ? If watching the clock makes you anxious, turn it facing away from you so you cannot see the time. ? If you worry when you lie down, start a worry book. Well before bedtime, write down your worries, and then set the book and your concerns aside. Healthy sleep habits  · If your doctor recommends it, try making healthy sleep habits. For example:  ? Keep your bedroom quiet, dark, and cool. ? Do not have drinks with caffeine, such as coffee or black tea, for 8 hours before bed. ? Do not smoke or use other types of tobacco near bedtime. Nicotine is a stimulant and can keep you awake. ? Avoid drinking alcohol late in the evening, because it can cause you to wake in the middle of the night. ? Do not eat a big meal close to bedtime. If you are hungry, eat a light snack. ? Do not drink a lot of water close to bedtime, because the need to urinate may wake you up during the night. ? Do not read, watch TV, or use your phone in bed. Use the bed only for sleeping and sex. Medicine  · Be safe with medicines. Take your medicines exactly as prescribed. Call your doctor if you think you are having a problem with your medicine. · Talk with your doctor before you try an over-the-counter medicine, herbal product, or supplement to try to improve your sleep.  Your doctor can recommend how much to take and when to take it. Make sure your doctor knows all of the medicines, vitamins, herbal products, and supplements you take. · You will get more details on the specific medicines your doctor prescribes. When should you call for help? Watch closely for changes in your health, and be sure to contact your doctor if:    · Your efforts to improve your sleep do not work.     · Your insomnia gets worse.     · You have been feeling down, depressed, or hopeless or have lost interest in things that you usually enjoy. Where can you learn more? Go to http://www.gray.com/  Enter P513 in the search box to learn more about \"Insomnia: Care Instructions. \"  Current as of: June 16, 2021               Content Version: 13.2  © 2006-2022 [employer]. Care instructions adapted under license by [employer] (which disclaims liability or warranty for this information). If you have questions about a medical condition or this instruction, always ask your healthcare professional. [place] [address] any warranty or liability for your use of this information. Migraine Headache: Care Instructions  Overview     Migraines are painful, throbbing headaches that often start on one side of the head. They may cause nausea and vomiting and make you sensitive to light, sound, or smell. Without treatment, migraines can last from 4 hours to a few days. Medicines can help prevent migraines or stop them after they have started. Your doctor can help you find which ones work best for you. Follow-up care is a key part of your treatment and safety. Be sure to make and go to all appointments, and call your doctor if you are having problems. It's also a good idea to know your test results and keep a list of the medicines you take. How can you care for yourself at home? · Do not drive if you have taken a prescription pain medicine.   · Rest in a quiet, dark room until your headache is gone. Close your eyes, and try to relax or go to sleep. Don't watch TV or read. · Put a cold, moist cloth or cold pack on the painful area for 10 to 20 minutes at a time. Put a thin cloth between the cold pack and your skin. · Use a warm, moist towel or a heating pad set on low to relax tight shoulder and neck muscles. · Have someone gently massage your neck and shoulders. · Take your medicines exactly as prescribed. Call your doctor if you think you are having a problem with your medicine. You will get more details on the specific medicines your doctor prescribes. · Don't take medicine for headache pain too often. Talk to your doctor if you are taking medicine more than 2 days a week to stop a headache. Taking too much pain medicine can lead to more headaches. These are called medicine-overuse headaches. To prevent migraines  · Keep a headache diary so you can figure out what triggers your headaches. Avoiding triggers may help you prevent headaches. Record when each headache began, how long it lasted, and what the pain was like. Write down any other symptoms you had with the headache, such as nausea, flashing lights or dark spots, or sensitivity to bright light or loud noise. Note if the headache occurred near your period. List anything that might have triggered the headache. Triggers may include certain foods (chocolate, cheese, wine) or odors, smoke, bright light, stress, or lack of sleep. · If your doctor has prescribed medicine for your migraines, take it as directed. You may have medicine that you take only when you get a migraine and medicine that you take all the time to help prevent migraines. ? If your doctor has prescribed medicine for when you get a headache, take it at the first sign of a migraine, unless your doctor has given you other instructions. ? If your doctor has prescribed medicine to prevent migraines, take it exactly as prescribed.  Call your doctor if you think you are having a problem with your medicine. · Find healthy ways to deal with stress. Migraines are most common during or right after stressful times. Try finding ways to reduce stress like practicing mindfulness or deep breathing exercises. · Get plenty of sleep and exercise. But be careful to not push yourself too hard during exercise. It may trigger a headache. · Eat meals on a regular schedule. Avoid foods and drinks that often trigger migraines. These include chocolate, alcohol (especially red wine and port), aspartame, monosodium glutamate (MSG), and some additives found in foods (such as hot dogs, varner, cold cuts, aged cheeses, and pickled foods). · Limit caffeine. Don't drink too much coffee, tea, or soda. But don't quit caffeine suddenly. That can also give you migraines. · Do not smoke or allow others to smoke around you. If you need help quitting, talk to your doctor about stop-smoking programs and medicines. These can increase your chances of quitting for good. · If you are taking birth control pills or hormone therapy, talk to your doctor about whether they are triggering your migraines. When should you call for help? Call 911 anytime you think you may need emergency care. For example, call if:    · You have signs of a stroke. These may include:  ? Sudden numbness, paralysis, or weakness in your face, arm, or leg, especially on only one side of your body. ? Sudden vision changes. ? Sudden trouble speaking. ? Sudden confusion or trouble understanding simple statements. ? Sudden problems with walking or balance. ? A sudden, severe headache that is different from past headaches. Call your doctor now or seek immediate medical care if:    · You have new or worse nausea and vomiting.     · You have a new or higher fever.     · Your headache gets much worse. Watch closely for changes in your health, and be sure to contact your doctor if:    · You are not getting better after 2 days (48 hours).    Where can you learn more? Go to http://www.gray.com/  Enter S643 in the search box to learn more about \"Migraine Headache: Care Instructions. \"  Current as of: December 13, 2021               Content Version: 13.2  © 8414-1488 [employer]. Care instructions adapted under license by [employer] (which disclaims liability or warranty for this information). If you have questions about a medical condition or this instruction, always ask your healthcare professional. [place] [address] any warranty or liability for your use of this information.

## 2022-07-18 NOTE — PROGRESS NOTES
Identified pt with two pt identifiers(name and ). Reviewed record in preparation for visit and have obtained necessary documentation. Chief Complaint   Patient presents with    Medication Refill    Weight Loss   [facility] Follow Up        Health Maintenance Due   Topic    COVID-19 Vaccine (3 - Booster for Moderna series)    Medicare Yearly Exam        Coordination of Care Questionnaire:  :   1) Have you been to an emergency room, urgent care, or hospitalized since your last visit? If yes, where when, and reason for visit? Yes, [facility] for [address] [place] for night sweats       2. Have seen or consulted any other health care provider since your last visit? If yes, where when, and reason for visit?  no        Patient is accompanied by self I have received verbal consent from [facility] [employer] to discuss any/all medical information while they are present in the room.

## 2022-07-19 ENCOUNTER — TELEPHONE (OUTPATIENT)
Dept: SURGERY | Age: 72
End: 2022-07-19

## 2022-07-19 NOTE — TELEPHONE ENCOUNTER
Called patient regarding referral to our [address] [place] [address] [facility]. Patient did not answer so I left a vmail with our contact information.

## 2022-07-25 LAB
ALBUMIN SERPL-MCNC: 4.8 G/DL (ref 3.5–5)
ALBUMIN/GLOB SERPL: 1.4 {RATIO} (ref 1.1–2.2)
ALP SERPL-CCNC: 103 U/L (ref 45–117)
ALT SERPL-CCNC: 20 U/L (ref 12–78)
ANION GAP SERPL CALC-SCNC: 7 MMOL/L (ref 5–15)
APPEARANCE UR: CLEAR
AST SERPL-CCNC: 23 U/L (ref 15–37)
BASOPHILS # BLD: 0.1 K/UL (ref 0–0.1)
BASOPHILS NFR BLD: 1 % (ref 0–1)
BILIRUB SERPL-MCNC: 0.7 MG/DL (ref 0.2–1)
BILIRUB UR QL: NEGATIVE
BUN SERPL-MCNC: 30 MG/DL (ref 6–20)
BUN/CREAT SERPL: 27 (ref 12–20)
CALCIUM SERPL-MCNC: 10.5 MG/DL (ref 8.5–10.1)
CHLORIDE SERPL-SCNC: 104 MMOL/L (ref 97–108)
CHOLEST SERPL-MCNC: 138 MG/DL
CO2 SERPL-SCNC: 27 MMOL/L (ref 21–32)
COLOR UR: NORMAL
CREAT SERPL-MCNC: 1.1 MG/DL (ref 0.55–1.02)
CREAT UR-MCNC: 34 MG/DL
DIFFERENTIAL METHOD BLD: ABNORMAL
EOSINOPHIL # BLD: 0.5 K/UL (ref 0–0.4)
EOSINOPHIL NFR BLD: 4 % (ref 0–7)
ERYTHROCYTE [DISTWIDTH] IN BLOOD BY AUTOMATED COUNT: 14.9 % (ref 11.5–14.5)
EST. AVERAGE GLUCOSE BLD GHB EST-MCNC: 117 MG/DL
GLOBULIN SER CALC-MCNC: 3.4 G/DL (ref 2–4)
GLUCOSE SERPL-MCNC: 95 MG/DL (ref 65–100)
GLUCOSE UR STRIP.AUTO-MCNC: NEGATIVE MG/DL
HBA1C MFR BLD: 5.7 % (ref 4–5.6)
HCT VFR BLD AUTO: 44.5 % (ref 35–47)
HDLC SERPL-MCNC: 74 MG/DL
HDLC SERPL: 1.9 {RATIO} (ref 0–5)
HGB BLD-MCNC: 14.7 G/DL (ref 11.5–16)
HGB UR QL STRIP: NEGATIVE
IMM GRANULOCYTES # BLD AUTO: 0 K/UL (ref 0–0.04)
IMM GRANULOCYTES NFR BLD AUTO: 0 % (ref 0–0.5)
KETONES UR QL STRIP.AUTO: NEGATIVE MG/DL
LDLC SERPL CALC-MCNC: 50.2 MG/DL (ref 0–100)
LEUKOCYTE ESTERASE UR QL STRIP.AUTO: NEGATIVE
LYMPHOCYTES # BLD: 5.1 K/UL (ref 0.8–3.5)
LYMPHOCYTES NFR BLD: 46 % (ref 12–49)
MCH RBC QN AUTO: 29.2 PG (ref 26–34)
MCHC RBC AUTO-ENTMCNC: 33 G/DL (ref 30–36.5)
MCV RBC AUTO: 88.5 FL (ref 80–99)
MICROALBUMIN UR-MCNC: 0.64 MG/DL
MICROALBUMIN/CREAT UR-RTO: 19 MG/G (ref 0–30)
MONOCYTES # BLD: 0.6 K/UL (ref 0–1)
MONOCYTES NFR BLD: 6 % (ref 5–13)
NEUTS SEG # BLD: 4.8 K/UL (ref 1.8–8)
NEUTS SEG NFR BLD: 43 % (ref 32–75)
NITRITE UR QL STRIP.AUTO: NEGATIVE
NRBC # BLD: 0 K/UL (ref 0–0.01)
NRBC BLD-RTO: 0 PER 100 WBC
PH UR STRIP: 6 [PH] (ref 5–8)
PLATELET # BLD AUTO: 301 K/UL (ref 150–400)
PMV BLD AUTO: 12.5 FL (ref 8.9–12.9)
POTASSIUM SERPL-SCNC: 5 MMOL/L (ref 3.5–5.1)
PROT SERPL-MCNC: 8.2 G/DL (ref 6.4–8.2)
PROT UR STRIP-MCNC: NEGATIVE MG/DL
RBC # BLD AUTO: 5.03 M/UL (ref 3.8–5.2)
SODIUM SERPL-SCNC: 138 MMOL/L (ref 136–145)
SP GR UR REFRACTOMETRY: 1.01 (ref 1–1.03)
TRIGL SERPL-MCNC: 69 MG/DL (ref ?–150)
TSH SERPL DL<=0.05 MIU/L-ACNC: 1.51 UIU/ML (ref 0.36–3.74)
UROBILINOGEN UR QL STRIP.AUTO: 0.2 EU/DL (ref 0.2–1)
VLDLC SERPL CALC-MCNC: 13.8 MG/DL
WBC # BLD AUTO: 11.2 K/UL (ref 3.6–11)

## 2022-08-03 ENCOUNTER — TRANSCRIBE ORDER (OUTPATIENT)
Dept: SCHEDULING | Age: 72
End: 2022-08-03

## 2022-08-03 DIAGNOSIS — N13.30 HYDRONEPHROSIS: Primary | ICD-10-CM

## 2022-08-05 ENCOUNTER — HOSPITAL ENCOUNTER (OUTPATIENT)
Dept: NUCLEAR MEDICINE | Age: 72
Discharge: HOME | End: 2022-08-05
Attending: UROLOGY
Payer: MEDICARE

## 2022-08-05 DIAGNOSIS — N13.30 HYDRONEPHROSIS: ICD-10-CM

## 2022-08-05 PROCEDURE — 74011250636 HC RX REV CODE- 250/636: Performed by: UROLOGY

## 2022-08-05 PROCEDURE — 78708 K FLOW/FUNCT IMAGE W/DRUG: CPT

## 2022-08-05 RX ORDER — BETIATIDE 1 MG/1
10 INJECTION, POWDER, LYOPHILIZED, FOR SOLUTION INTRAVENOUS
Status: COMPLETED | OUTPATIENT
Start: 2022-08-05 | End: 2022-08-05

## 2022-08-05 RX ORDER — FUROSEMIDE 10 MG/ML
20 INJECTION INTRAMUSCULAR; INTRAVENOUS ONCE
Status: COMPLETED | OUTPATIENT
Start: 2022-08-05 | End: 2022-08-05

## 2022-08-05 RX ORDER — BETIATIDE 1 MG/1
10 INJECTION, POWDER, LYOPHILIZED, FOR SOLUTION INTRAVENOUS
Status: DISCONTINUED | OUTPATIENT
Start: 2022-08-05 | End: 2022-08-05 | Stop reason: SDUPTHER

## 2022-08-05 RX ADMIN — BETIATIDE 10 MILLICURIE: 1 INJECTION, POWDER, LYOPHILIZED, FOR SOLUTION INTRAVENOUS at 13:10

## 2022-08-05 RX ADMIN — FUROSEMIDE 20 MG: 10 INJECTION, SOLUTION INTRAMUSCULAR; INTRAVENOUS at 13:14

## 2022-10-18 ENCOUNTER — OFFICE VISIT (OUTPATIENT)
Dept: FAMILY MEDICINE CLINIC | Age: 72
End: 2022-10-18
Payer: MEDICARE

## 2022-10-18 VITALS
HEIGHT: 60 IN | WEIGHT: 165 LBS | BODY MASS INDEX: 32.39 KG/M2 | DIASTOLIC BLOOD PRESSURE: 79 MMHG | OXYGEN SATURATION: 96 % | HEART RATE: 81 BPM | TEMPERATURE: 97.1 F | SYSTOLIC BLOOD PRESSURE: 124 MMHG | RESPIRATION RATE: 16 BRPM

## 2022-10-18 DIAGNOSIS — G47.00 INSOMNIA, UNSPECIFIED TYPE: ICD-10-CM

## 2022-10-18 DIAGNOSIS — H61.23 CERUMEN DEBRIS ON TYMPANIC MEMBRANE OF BOTH EARS: ICD-10-CM

## 2022-10-18 DIAGNOSIS — Z23 ENCOUNTER FOR IMMUNIZATION: Primary | ICD-10-CM

## 2022-10-18 PROCEDURE — 3017F COLORECTAL CA SCREEN DOC REV: CPT | Performed by: NURSE PRACTITIONER

## 2022-10-18 PROCEDURE — 69209 REMOVE IMPACTED EAR WAX UNI: CPT | Performed by: NURSE PRACTITIONER

## 2022-10-18 PROCEDURE — 1090F PRES/ABSN URINE INCON ASSESS: CPT | Performed by: NURSE PRACTITIONER

## 2022-10-18 PROCEDURE — G0008 ADMIN INFLUENZA VIRUS VAC: HCPCS | Performed by: NURSE PRACTITIONER

## 2022-10-18 PROCEDURE — G8427 DOCREV CUR MEDS BY ELIG CLIN: HCPCS | Performed by: NURSE PRACTITIONER

## 2022-10-18 PROCEDURE — 1123F ACP DISCUSS/DSCN MKR DOCD: CPT | Performed by: NURSE PRACTITIONER

## 2022-10-18 PROCEDURE — 1101F PT FALLS ASSESS-DOCD LE1/YR: CPT | Performed by: NURSE PRACTITIONER

## 2022-10-18 PROCEDURE — G9899 SCRN MAM PERF RSLTS DOC: HCPCS | Performed by: NURSE PRACTITIONER

## 2022-10-18 PROCEDURE — G8536 NO DOC ELDER MAL SCRN: HCPCS | Performed by: NURSE PRACTITIONER

## 2022-10-18 PROCEDURE — G8432 DEP SCR NOT DOC, RNG: HCPCS | Performed by: NURSE PRACTITIONER

## 2022-10-18 PROCEDURE — G8752 SYS BP LESS 140: HCPCS | Performed by: NURSE PRACTITIONER

## 2022-10-18 PROCEDURE — 99214 OFFICE O/P EST MOD 30 MIN: CPT | Performed by: NURSE PRACTITIONER

## 2022-10-18 PROCEDURE — 90694 VACC AIIV4 NO PRSRV 0.5ML IM: CPT | Performed by: NURSE PRACTITIONER

## 2022-10-18 PROCEDURE — G8417 CALC BMI ABV UP PARAM F/U: HCPCS | Performed by: NURSE PRACTITIONER

## 2022-10-18 PROCEDURE — G8754 DIAS BP LESS 90: HCPCS | Performed by: NURSE PRACTITIONER

## 2022-10-18 RX ORDER — ZOLPIDEM TARTRATE 5 MG/1
5 TABLET ORAL
Qty: 30 TABLET | Refills: 2 | Status: SHIPPED | OUTPATIENT
Start: 2022-10-18

## 2022-10-18 NOTE — PATIENT INSTRUCTIONS
Consult with Cardiology about Cholesterol medication  Use Debrox weekly to reduce ear wax buildup  Schedule with Dr Milka Harrington early for next refill of Ambien

## 2022-10-18 NOTE — PROGRESS NOTES
Assessment/Plan:     Diagnoses and all orders for this visit:    1. Encounter for immunization  -     INFLUENZA, FLUAD, (AGE 72 Y+), IM, PF, 0.5 ML  Seen for immunization for flu. Consent obtained flu shot given. 2. Insomnia, unspecified type  -     zolpidem (AMBIEN) 5 mg tablet; Take 1 Tablet by mouth nightly as needed for Sleep. Max Daily Amount: 5 mg. Indications: difficulty falling asleep  Refill of medication for insomnia. Told her this will be the last refill and that she would need to see Dr. Sanchez Vasquez for continued with a controlled substance. 3. Cerumen debris on tympanic membrane of both ears  -     REMOVAL IMPACTED CERUMEN IRRIGATION/LVG UNILAT  Reports full feeling in both ears, has impacted cerumen, irrigated and cleared. Discussed expected course/resolution/complications of diagnosis in detail with patient. Medication risks/benefits/costs/interactions/alternatives discussed with patient. Pt was given after visit summary which includes diagnoses, current medications & vitals. Pt expressed understanding with the diagnosis and plan        Subjective:      Khadijah Leggett is a 74 y.o. female who presents for had concerns including Follow-up, Medication Refill, Concern For COVID-19 (Coronavirus), and Ear Fullness. Current Outpatient Medications   Medication Sig Dispense Refill    zolpidem (AMBIEN) 5 mg tablet Take 1 Tablet by mouth nightly as needed for Sleep. Max Daily Amount: 5 mg. Indications: difficulty falling asleep 30 Tablet 2    gemfibroziL (LOPID) 600 mg tablet TAKE 1 TABLET BY MOUTH TWICE DAILY 180 Tablet 1    ezetimibe (ZETIA) 10 mg tablet TAKE 1 TABLET BY MOUTH DAILY 90 Tablet 1    metoprolol tartrate (LOPRESSOR) 25 mg tablet TAKE 1 TABLET BY MOUTH TWICE DAILY 180 Tablet 1    nystatin (MYCOSTATIN) powder Apply  to affected area four (4) times daily.  60 g 2    alendronate (FOSAMAX) 70 mg tablet TAKE 1 TABLET BY MOUTH EVERY 7 DAYS 12 Tablet 1    glycerin-min oil-polycarbophil (Replens) gel Use nightly. Topical use only. Put thin layer in vulvar area. 35 g 0    REPATHA SURECLICK pen injection 609 mg Once every 2 weeks. 6    KRILL OIL PO Take 500 mg by mouth daily.  multivitamin (MULTIPLE VITAMINS PO) Take 1 Tab by mouth daily.  Lactobacillus acidophilus (ACIDOPHILUS PO) Take 1 Cap by mouth daily.  cholecalciferol, vitamin D3, 2,000 unit tab Take 2,000 Units by mouth daily.  turmeric (CURCUMIN) Take 500 mg by mouth daily.  acetaminophen (TYLENOL EXTRA STRENGTH PO) Take 500 mg by mouth as needed. Takes 2 po as needed for leg or back pain.  cetirizine (ZYRTEC) 10 mg tablet Take 10 mg by mouth daily. Allergies   Allergen Reactions    Ciprofloxacin Unknown (comments)     Joint inflammation      Ketorolac Unable to Obtain    Nitrofurantoin Other (comments)     LOW WBC - 2000    Nitrofurantoin Macrocrystal Unable to Obtain    Nsaids (Non-Steroidal Anti-Inflammatory Drug) Other (comments)     GI bleed.  Other Plant, Animal, Environmental Other (comments)     Mold, dust, cats, dog allergies. ENVIRONMENTAL ALLERGIES.  Statins-Hmg-Coa Reductase Inhibitors Other (comments)     LFT increases    Tolmetin Unknown (comments)    Toradol [Ketorolac Tromethamine] Rash     Past Medical History:   Diagnosis Date    Aneurysm ([facility] [address]     splenic artery aneurysm - no longer needs to be followed up - will never be a problem    Chronic pain     back    CKD (chronic kidney disease) stage 3, GFR 30-59 ml/min ([facility])     s/p embolization or left renal artery for bleeding after staghorn calculus surgery 2/2022    COVID-19     Hypoxic respiratory failure. Hospitalized 12/26/2020-12/29/2020.     Gastrointestinal bleeding     GI bleed - stomach - NSAID use    History of vascular access device 12/28/2020    4 Fr midilne, 10 cm L basilic, poor access    Hyperlipidemia     Hypertension     Migraine     Osteoporosis     By DEXA 2019    Other ill-defined conditions(799.89)     wheezing with allergies    Overactive bladder     PUD (peptic ulcer disease)     Spinal stenosis of lumbar region     Thromboembolus [facility])     PE's in her 42's     Past Surgical History:   Procedure Laterality Date    COLONOSCOPY N/A 10/1/2018    COLONOSCOPY performed by Sung Hughes MD at [address] HX HYSTERECTOMY      HX ORTHOPAEDIC  2009    spinal fusion/has not had a lumbar laminectomy    HX ORTHOPAEDIC Bilateral     bunionectomy - bilateral once and unilateral once    HX ORTHOPAEDIC Bilateral     carpal tunnel release    HX TONSILLECTOMY      T & A    HX UROLOGICAL  2012    bladder repair    HX UROLOGICAL  02/07/2022 2/7/2022 removal staghorn calculus. Subsequent hemorrhage requiring transfusion and embolization of left renal arteries.      Family History   Problem Relation Age of Onset    Cancer Father         bladder    Cancer Brother         BCCA    Stroke Mother     Cancer Maternal Uncle         leukemia    Breast Cancer Paternal Aunt     Stroke Maternal Grandmother     Colon Cancer Paternal Grandfather     Cancer Maternal Uncle         throat     Social History     Socioeconomic History    Marital status:      Spouse name: Not on file    Number of children: Not on file    Years of education: Not on file    Highest education level: Not on file   Occupational History    Not on file   Tobacco Use    Smoking status: Never    Smokeless tobacco: Never   Vaping Use    Vaping Use: Never used   Substance and Sexual Activity    Alcohol use: Not Currently     Comment: very, very rare    Drug use: Never    Sexual activity: Yes     Partners: Male   Other Topics Concern     Service Not Asked    Blood Transfusions Not Asked    Caffeine Concern Not Asked    Occupational Exposure Not Asked    Hobby Hazards Not Asked    Sleep Concern Not Asked    Stress Concern Not Asked    Weight Concern Not Asked    Special Diet Not Asked    Back Care Not Asked    Exercise Not Asked    Bike Helmet Not Asked   [address] Not Asked    Self-Exams Not Asked   Social History Narrative    Not on file     Social Determinants of Health     Financial Resource Strain: Not on file   Food Insecurity: Not on file   Transportation Needs: Not on file   Physical Activity: Not on file   Stress: Not on file   Social Connections: Not on file   Intimate Partner Violence: Not on file   Housing Stability: Not on file       HPI      ROS:   Review of Systems   Constitutional:  Negative for fever and malaise/fatigue. HENT:  Positive for ear pain. Negative for congestion, sinus pain and sore throat. Eyes: Negative. Respiratory:  Negative for cough and shortness of breath. Cardiovascular:  Negative for chest pain. Gastrointestinal:  Negative for diarrhea, nausea and vomiting. Genitourinary:  Negative for dysuria. Musculoskeletal: Negative. Skin: Negative. Neurological:  Negative for dizziness and headaches. Endo/Heme/Allergies:  Negative for environmental allergies. Psychiatric/Behavioral:  The patient has insomnia. Objective:   Visit Vitals  /79 (BP 1 Location: Left upper arm, BP Patient Position: Sitting, BP Cuff Size: Small adult)   Pulse 81   Temp 97.1 °F (36.2 °C) (Skin)   Resp 16   Ht 5' (1.524 m)   Wt 165 lb (74.8 kg)   SpO2 96%   BMI 32.22 kg/m²         Vitals and Nurse Documentation reviewed. Physical Exam  Vitals and nursing note reviewed. Constitutional:       General: She is not in acute distress. Appearance: Normal appearance. HENT:      Head: Normocephalic and atraumatic. Right Ear: External ear normal.      Left Ear: External ear normal.      Mouth/Throat:      Mouth: Mucous membranes are moist.   Eyes:      Pupils: Pupils are equal, round, and reactive to light. Cardiovascular:      Rate and Rhythm: Normal rate and regular rhythm. Pulses: Normal pulses.       Heart sounds: Normal heart sounds. Pulmonary:      Effort: Pulmonary effort is normal.      Breath sounds: Normal breath sounds. Abdominal:      General: Abdomen is flat. Musculoskeletal:         General: Normal range of motion. Cervical back: Normal range of motion. Skin:     General: Skin is warm and dry. Neurological:      General: No focal deficit present. Mental Status: She is alert and oriented to person, place, and time. Mental status is at baseline. Psychiatric:         Mood and Affect: Mood normal.         Behavior: Behavior normal.     Results for orders placed or performed in visit on 07/18/22   CBC WITH AUTOMATED DIFF   Result Value Ref Range    WBC 11.2 (H) 3.6 - 11.0 K/uL    RBC 5.03 3.80 - 5.20 M/uL    HGB 14.7 11.5 - 16.0 g/dL    HCT 44.5 35.0 - 47.0 %    MCV 88.5 80.0 - 99.0 FL    MCH 29.2 26.0 - 34.0 PG    MCHC 33.0 30.0 - 36.5 g/dL    RDW 14.9 (H) 11.5 - 14.5 %    PLATELET 411 555 - 328 K/uL    MPV 12.5 8.9 - 12.9 FL    NRBC 0.0 0  WBC    ABSOLUTE NRBC 0.00 0.00 - 0.01 K/uL    NEUTROPHILS 43 32 - 75 %    LYMPHOCYTES 46 12 - 49 %    MONOCYTES 6 5 - 13 %    EOSINOPHILS 4 0 - 7 %    BASOPHILS 1 0 - 1 %    IMMATURE GRANULOCYTES 0 0.0 - 0.5 %    ABS. NEUTROPHILS 4.8 1.8 - 8.0 K/UL    ABS. LYMPHOCYTES 5.1 (H) 0.8 - 3.5 K/UL    ABS. MONOCYTES 0.6 0.0 - 1.0 K/UL    ABS. EOSINOPHILS 0.5 (H) 0.0 - 0.4 K/UL    ABS. BASOPHILS 0.1 0.0 - 0.1 K/UL    ABS. IMM.  GRANS. 0.0 0.00 - 0.04 K/UL    DF AUTOMATED     HEMOGLOBIN A1C WITH EAG   Result Value Ref Range    Hemoglobin A1c 5.7 (H) 4.0 - 5.6 %    Est. average glucose 117 mg/dL   URINALYSIS W/ RFLX MICROSCOPIC   Result Value Ref Range    Color YELLOW/STRAW      Appearance CLEAR CLEAR      Specific gravity 1.010 1.003 - 1.030      pH (UA) 6.0 5.0 - 8.0      Protein Negative Negative mg/dL    Glucose Negative Negative mg/dL    Ketone Negative Negative mg/dL    Bilirubin Negative Negative      Blood Negative Negative      Urobilinogen 0.2 0.2 - 1.0 EU/dL Nitrites Negative Negative      Leukocyte Esterase Negative Negative     TSH 3RD GENERATION   Result Value Ref Range    TSH 1.51 0.36 - 3.74 uIU/mL   MICROALBUMIN, UR, RAND W/ MICROALB/CREAT RATIO   Result Value Ref Range    Microalbumin,urine random 0.64 MG/DL    Creatinine, urine random 34.00 mg/dL    Microalbumin/Creat ratio (mg/g creat) 19 0 - 30 mg/g   METABOLIC PANEL, COMPREHENSIVE   Result Value Ref Range    Sodium 138 136 - 145 mmol/L    Potassium 5.0 3.5 - 5.1 mmol/L    Chloride 104 97 - 108 mmol/L    CO2 27 21 - 32 mmol/L    Anion gap 7 5 - 15 mmol/L    Glucose 95 65 - 100 mg/dL    BUN 30 (H) 6 - 20 MG/DL    Creatinine 1.10 (H) 0.55 - 1.02 MG/DL    BUN/Creatinine ratio 27 (H) 12 - 20      GFR est AA 59 (L) >60 ml/min/1.73m2    GFR est non-AA 49 (L) >60 ml/min/1.73m2    Calcium 10.5 (H) 8.5 - 10.1 MG/DL    Bilirubin, total 0.7 0.2 - 1.0 MG/DL    ALT (SGPT) 20 12 - 78 U/L    AST (SGOT) 23 15 - 37 U/L    Alk.  phosphatase 103 45 - 117 U/L    Protein, total 8.2 6.4 - 8.2 g/dL    Albumin 4.8 3.5 - 5.0 g/dL    Globulin 3.4 2.0 - 4.0 g/dL    A-G Ratio 1.4 1.1 - 2.2     LIPID PANEL   Result Value Ref Range    Cholesterol, total 138 <200 MG/DL    Triglyceride 69 <150 MG/DL    HDL Cholesterol 74 MG/DL    LDL, calculated 50.2 0 - 100 MG/DL    VLDL, calculated 13.8 MG/DL    CHOL/HDL Ratio 1.9 0.0 - 5.0

## 2022-10-18 NOTE — PROGRESS NOTES
1. Have you been to the ER, urgent care clinic since your last visit? Hospitalized since your last visit? No    2. Have you seen or consulted any other health care providers outside of the [address] [place] [address] since your last visit? Include any pap smears or colon screening. No    Chief Complaint   Patient presents with    Follow-up    Medication Refill    Concern For COVID-19 (Coronavirus)    Ear Fullness     Health Maintenance Due   Topic Date Due    COVID-19 Vaccine (3 - Booster for Moderna series) 08/31/2021    Flu Vaccine (1) 08/01/2022    Medicare Yearly Exam  08/12/2022    Depression Screen  10/28/2022     3 most recent PHQ Screens 10/18/2022   Little interest or pleasure in doing things Not at all   Feeling down, depressed, irritable, or hopeless Not at all   Total Score PHQ 2 0     Abuse Screening Questionnaire 10/18/2022   Do you ever feel afraid of your partner? N   Are you in a relationship with someone who physically or mentally threatens you? N   Is it safe for you to go home? Y     Fall Risk Assessment, last 12 mths 10/18/2022   Able to walk? Yes   Fall in past 12 months? 0   Do you feel unsteady?  0   Are you worried about falling 0     Visit Vitals  /79 (BP 1 Location: Left upper arm, BP Patient Position: Sitting, BP Cuff Size: Small adult)   Pulse 81   Temp 97.1 °F (36.2 °C) (Skin)   Resp 16   Ht 5' (1.524 m)   Wt 165 lb (74.8 kg)   SpO2 96%   BMI 32.22 kg/m²

## 2022-11-06 LAB — SARS-COV-2: NOT DETECTED

## 2022-11-30 RX ORDER — METOPROLOL TARTRATE 25 MG/1
TABLET, FILM COATED ORAL
Qty: 180 TABLET | Refills: 1 | Status: SHIPPED | OUTPATIENT
Start: 2022-11-30

## 2022-12-02 ENCOUNTER — TRANSCRIBE ORDER (OUTPATIENT)
Dept: SCHEDULING | Age: 72
End: 2022-12-02

## 2022-12-02 DIAGNOSIS — Z12.31 VISIT FOR SCREENING MAMMOGRAM: Primary | ICD-10-CM

## 2022-12-14 RX ORDER — EZETIMIBE 10 MG/1
TABLET ORAL
Qty: 90 TABLET | Refills: 1 | Status: SHIPPED | OUTPATIENT
Start: 2022-12-14

## 2023-01-04 DIAGNOSIS — M81.0 OSTEOPOROSIS, UNSPECIFIED OSTEOPOROSIS TYPE, UNSPECIFIED PATHOLOGICAL FRACTURE PRESENCE: ICD-10-CM

## 2023-01-04 RX ORDER — ALENDRONATE SODIUM 70 MG/1
TABLET ORAL
Qty: 12 TABLET | Refills: 1 | Status: SHIPPED | OUTPATIENT
Start: 2023-01-04

## 2023-01-18 ENCOUNTER — OFFICE VISIT (OUTPATIENT)
Dept: FAMILY MEDICINE CLINIC | Age: 73
End: 2023-01-18
Payer: MEDICARE

## 2023-01-18 VITALS
TEMPERATURE: 97 F | BODY MASS INDEX: 34.04 KG/M2 | WEIGHT: 173.4 LBS | DIASTOLIC BLOOD PRESSURE: 77 MMHG | HEART RATE: 69 BPM | RESPIRATION RATE: 20 BRPM | SYSTOLIC BLOOD PRESSURE: 130 MMHG | HEIGHT: 60 IN | OXYGEN SATURATION: 96 %

## 2023-01-18 DIAGNOSIS — G47.00 INSOMNIA, UNSPECIFIED TYPE: ICD-10-CM

## 2023-01-18 DIAGNOSIS — Z00.00 MEDICARE ANNUAL WELLNESS VISIT, SUBSEQUENT: Primary | ICD-10-CM

## 2023-01-18 DIAGNOSIS — N18.31 STAGE 3A CHRONIC KIDNEY DISEASE (HCC): ICD-10-CM

## 2023-01-18 DIAGNOSIS — M81.0 OSTEOPOROSIS, UNSPECIFIED OSTEOPOROSIS TYPE, UNSPECIFIED PATHOLOGICAL FRACTURE PRESENCE: ICD-10-CM

## 2023-01-18 DIAGNOSIS — F41.9 ANXIETY: ICD-10-CM

## 2023-01-18 PROBLEM — N18.30 CHRONIC RENAL DISEASE, STAGE III (HCC): Status: ACTIVE | Noted: 2023-01-18

## 2023-01-18 PROCEDURE — 3075F SYST BP GE 130 - 139MM HG: CPT | Performed by: FAMILY MEDICINE

## 2023-01-18 PROCEDURE — 1123F ACP DISCUSS/DSCN MKR DOCD: CPT | Performed by: FAMILY MEDICINE

## 2023-01-18 PROCEDURE — G0439 PPPS, SUBSEQ VISIT: HCPCS | Performed by: FAMILY MEDICINE

## 2023-01-18 PROCEDURE — 3078F DIAST BP <80 MM HG: CPT | Performed by: FAMILY MEDICINE

## 2023-01-18 PROCEDURE — 99213 OFFICE O/P EST LOW 20 MIN: CPT | Performed by: FAMILY MEDICINE

## 2023-01-18 RX ORDER — CLONAZEPAM 0.5 MG/1
0.5 TABLET ORAL
Qty: 30 TABLET | Refills: 2 | Status: SHIPPED | OUTPATIENT
Start: 2023-01-18

## 2023-01-18 NOTE — PROGRESS NOTES
Patient stated name &   Chief Complaint   Patient presents with    Medication Refill     Zolpidem    Migraine     10 days now        Health Maintenance Due   Topic    COVID-19 Vaccine (3 - Booster for Moderna series)    Medicare Yearly Exam        Wt Readings from Last 3 Encounters:   23 173 lb 6.4 oz (78.7 kg)   10/18/22 165 lb (74.8 kg)   22 157 lb (71.2 kg)     Temp Readings from Last 3 Encounters:   23 97 °F (36.1 °C) (Temporal)   10/18/22 97.1 °F (36.2 °C) (Skin)   22 97.8 °F (36.6 °C) (Temporal)     BP Readings from Last 3 Encounters:   23 (!) 148/93   10/18/22 124/79   22 125/80     Pulse Readings from Last 3 Encounters:   23 69   10/18/22 81   22 66         Learning Assessment:  :     Learning Assessment 3/8/2019 2018   PRIMARY LEARNER Patient Patient   PRIMARY LANGUAGE ENGLISH ENGLISH   LEARNER PREFERENCE PRIMARY READING READING   ANSWERED BY patient patient   RELATIONSHIP SELF SELF       Depression Screening:  :     3 most recent PHQ Screens 2023   Little interest or pleasure in doing things Not at all   Feeling down, depressed, irritable, or hopeless Not at all   Total Score PHQ 2 0       Fall Risk Assessment:  :     Fall Risk Assessment, last 12 mths 2023   Able to walk? Yes   Fall in past 12 months? 0   Do you feel unsteady? 0   Are you worried about falling 0       Abuse Screening:  :     Abuse Screening Questionnaire 2023 10/18/2022 10/12/2020 2018   Do you ever feel afraid of your partner? N N N N   Are you in a relationship with someone who physically or mentally threatens you? N N N N   Is it safe for you to go home? Vernia Dubin       Coordination of Care Questionnaire:  :   1. \"Have you been to the ER, urgent care clinic since your last visit? Hospitalized since your last visit? \" Yes Where: Urgent Care [facility]     Possible UTI & URI    2.  \"Have you seen or consulted any other health care providers outside of the [facility] since your last visit? \"      3. For patients aged 39-70: Has the patient had a colonoscopy / FIT/ Cologuard? No      If the patient is female:    4. For patients aged 41-77: Has the patient had a mammogram within the past 2 years? No      5. For patients aged 21-65: Has the patient had a pap smear? No     3) Do you have an Advance Directive on file? no  Are you interested in receiving information about Advance Directives? no    Patient is accompanied by self I have received verbal consent from Laurann Siemens to discuss any/all medical information while they are present in the room.

## 2023-01-18 NOTE — PROGRESS NOTES
Domi Donohue  74 y.o. female  1950  GQP:[id number]  [facility] MEDICINE  Progress Note     Encounter Date: 1/18/2023    Assessment and Plan:     Encounter Diagnoses     ICD-10-CM ICD-9-CM   1. Medicare annual wellness visit, subsequent  Z00.00 V70.0   2. Osteoporosis, unspecified osteoporosis type, unspecified pathological fracture presence  M81.0 733.00   3. Insomnia, unspecified type  G47.00 780.52   4. Anxiety  F41.9 300.00   5. Stage 3a chronic kidney disease (HCC)  N18.31 585.3       1. Medicare annual wellness visit, subsequent  updated    2. Osteoporosis, unspecified osteoporosis type, unspecified pathological fracture presence  DEXA next visit    3. Insomnia, unspecified type  DC ambien  Major component of anxiety  Change to clonazepam  - clonazePAM (KlonoPIN) 0.5 mg tablet; Take 1 Tablet by mouth nightly as needed for Anxiety. Max Daily Amount: 0.5 mg.  Dispense: 30 Tablet; Refill: 2    4. Anxiety  As above  FU 3 months  - clonazePAM (KlonoPIN) 0.5 mg tablet; Take 1 Tablet by mouth nightly as needed for Anxiety. Max Daily Amount: 0.5 mg.  Dispense: 30 Tablet; Refill: 2    5. Stage 3a chronic kidney disease ([address]  Discussed. I have discussed the diagnosis with the patient and the intended plan as seen in the above orders. she has expressed understanding. The patient has received an after-visit summary and questions were answered concerning future plans. I have discussed medication side effects and warnings with the patient as well. Electronically Signed: Diana Helton MD    Current Medications after this visit     Current Outpatient Medications   Medication Sig    clonazePAM (KlonoPIN) 0.5 mg tablet Take 1 Tablet by mouth nightly as needed for Anxiety.  Max Daily Amount: 0.5 mg.    alendronate (FOSAMAX) 70 mg tablet TAKE 1 TABLET BY MOUTH EVERY 7 DAYS    metoprolol tartrate (LOPRESSOR) 25 mg tablet TAKE 1 TABLET BY MOUTH TWICE DAILY    nystatin (MYCOSTATIN) powder Apply to affected area four (4) times daily. glycerin-min oil-polycarbophil (Replens) gel Use nightly. Topical use only. Put thin layer in vulvar area. REPATHA SURECLICK pen injection 707 mg Once every 2 weeks. KRILL OIL PO Take 500 mg by mouth daily. multivitamin (MULTIPLE VITAMINS PO) Take 1 Tab by mouth daily. Lactobacillus acidophilus (ACIDOPHILUS PO) Take 1 Cap by mouth daily. cholecalciferol, vitamin D3, 2,000 unit tab Take 2,000 Units by mouth daily. turmeric (CURCUMIN) Take 500 mg by mouth daily. acetaminophen (TYLENOL EXTRA STRENGTH PO) Take 500 mg by mouth as needed. Takes 2 po as needed for leg or back pain. cetirizine (ZYRTEC) 10 mg tablet Take 10 mg by mouth daily. No current facility-administered medications for this visit. Medications Discontinued During This Encounter   Medication Reason    gemfibroziL (LOPID) 600 mg tablet Not A Current Medication    ezetimibe (ZETIA) 10 mg tablet Not A Current Medication    zolpidem (AMBIEN) 5 mg tablet Not A Current Medication     ~~~~~~~~~~~~~~~~~~~~~~~~~~~~~~~~~~~~~~~~~~~~~~~~~~~~~~~~~~~    Chief Complaint   Patient presents with    Medication Refill     Zolpidem    Migraine     10 days now       History provided by patient  History of Present Illness   Erin Chiu is a 74 y.o. female who presents to clinic today for:  Medication Refill (Zolpidem) and Migraine (10 days now)    Not sleeping long  Takes zolpidem  Not as effective at the lower dose  History of PTSD. Alcoholic parents  Wonders if she should go up. Takes warm bath at night. BP has gone back up some  Migraine past ten days. Uses Risotryptan for migraine    Hyperlipidemia  Repatha  Stopped all other meds  Still on olive and krill oil.             Health Maintenance  Completed [facility] at today's visit  Health Maintenance Due   Topic Date Due    COVID-19 Vaccine (3 - Booster for Moderna series) 05/26/2021     Review of Systems   Review of Systems   Respiratory: Negative for shortness of breath. Cardiovascular:  Negative for chest pain and leg swelling. Gastrointestinal:  Negative for blood in stool. Genitourinary:  Negative for hematuria. Psychiatric/Behavioral: Negative. Vitals/Objective:     Vitals:    01/18/23 1052 01/18/23 1058 01/18/23 1128   BP: (!) 148/93 (!) 147/77 130/77   Pulse: 69     Resp: 20     Temp: 97 °F (36.1 °C)     TempSrc: Temporal     SpO2: 96%     Weight: 173 lb 6.4 oz (78.7 kg)     Height: 5' (1.524 m)       Body mass index is 33.86 kg/m². Wt Readings from Last 3 Encounters:   01/18/23 173 lb 6.4 oz (78.7 kg)   10/18/22 165 lb (74.8 kg)   07/18/22 157 lb (71.2 kg)         Objective  Physical Exam  Vitals and nursing note reviewed. Constitutional:       Appearance: Normal appearance. She is not toxic-appearing. Comments: Walks with cane   HENT:      Head: Normocephalic and atraumatic. Cardiovascular:      Rate and Rhythm: Normal rate and regular rhythm. Heart sounds: Normal heart sounds. No murmur heard. No gallop. Pulmonary:      Effort: Pulmonary effort is normal. No respiratory distress. Breath sounds: Normal breath sounds. No wheezing, rhonchi or rales. Musculoskeletal:      Cervical back: No muscular tenderness. Lymphadenopathy:      Cervical: No cervical adenopathy. Neurological:      Mental Status: She is alert. Psychiatric:         Mood and Affect: Mood normal.         Behavior: Behavior normal.         Thought Content: Thought content normal.         Judgment: Judgment normal.       No results found for this or any previous visit (from the past 24 hour(s)). Disposition     Follow-up and Dispositions    Return in about 3 months (around 4/18/2023) for Medication follow up. Future Appointments   Date Time Provider Jhon Orona   1/24/2023  2:45 PM [facility]       History   Patient's past medical, surgical and family histories were reviewed and updated.     Past Medical History:   Diagnosis Date    Aneurysm ([facility] [address]     splenic artery aneurysm - no longer needs to be followed up - will never be a problem    Chronic pain     back    CKD (chronic kidney disease) stage 3, GFR 30-59 ml/min ([facility])     s/p embolization or left renal artery for bleeding after staghorn calculus surgery 2/2022    COVID-19     Hypoxic respiratory failure. Hospitalized 12/26/2020-12/29/2020. Gastrointestinal bleeding     GI bleed - stomach - NSAID use    History of vascular access device 12/28/2020    4 Fr midilne, 10 cm L basilic, poor access    Hyperlipidemia     Hypertension     Migraine     Osteoporosis     By DEXA 2019    Other ill-defined conditions(799.89)     wheezing with allergies    Overactive bladder     PUD (peptic ulcer disease)     Spinal stenosis of lumbar region     Thromboembolus [facility])     PE's in her 42's     Past Surgical History:   Procedure Laterality Date    COLONOSCOPY N/A 10/1/2018    COLONOSCOPY performed by Scott Tate MD at [address]      HX ORTHOPAEDIC  2009    spinal fusion/has not had a lumbar laminectomy    HX ORTHOPAEDIC Bilateral     bunionectomy - bilateral once and unilateral once    HX ORTHOPAEDIC Bilateral     carpal tunnel release    HX TONSILLECTOMY      T & A    HX UROLOGICAL  2012    bladder repair    HX UROLOGICAL  02/07/2022 2/7/2022 removal staghorn calculus. Subsequent hemorrhage requiring transfusion and embolization of left renal arteries.      Family History   Problem Relation Age of Onset    Cancer Father         bladder    Cancer Brother         BCCA    Stroke Mother     Cancer Maternal Uncle         leukemia    Breast Cancer Paternal Aunt     Stroke Maternal Grandmother     Colon Cancer Paternal Grandfather     Cancer Maternal Uncle         throat     Social History     Tobacco Use    Smoking status: Never    Smokeless tobacco: Never   Vaping Use    Vaping Use: Never used   Substance Use Topics    Alcohol use: Not Currently     Comment: very, very rare    Drug use: Never       Allergies     Allergies   Allergen Reactions    Ciprofloxacin Unknown (comments)     Joint inflammation      Ketorolac Unable to Obtain    Nitrofurantoin Other (comments)     LOW WBC - 2000    Nitrofurantoin Macrocrystal Unable to Obtain    Nsaids (Non-Steroidal Anti-Inflammatory Drug) Other (comments)     GI bleed. Other Plant, Animal, Environmental Other (comments)     Mold, dust, cats, dog allergies. ENVIRONMENTAL ALLERGIES. Statins-Hmg-Coa Reductase Inhibitors Other (comments)     LFT increases    Tolmetin Unknown (comments)    Toradol [Ketorolac Tromethamine] Rash                     This is the Subsequent Medicare Annual Wellness Exam, performed 12 months or more after the Initial AWV or the last Subsequent AWV    I have reviewed the patient's medical history in detail and updated the computerized patient record. Assessment/Plan   Education and counseling provided:  Are appropriate based on today's review and evaluation  End-of-Life planning (with patient's consent)    1. Medicare annual wellness visit, subsequent  2. Osteoporosis, unspecified osteoporosis type, unspecified pathological fracture presence  3. Insomnia, unspecified type  -     clonazePAM (KlonoPIN) 0.5 mg tablet; Take 1 Tablet by mouth nightly as needed for Anxiety. Max Daily Amount: 0.5 mg., Normal, Disp-30 Tablet, R-2  4. Anxiety  -     clonazePAM (KlonoPIN) 0.5 mg tablet; Take 1 Tablet by mouth nightly as needed for Anxiety. Max Daily Amount: 0.5 mg., Normal, Disp-30 Tablet, R-2  5.  Stage 3a chronic kidney disease (HCC)     Depression Risk Factor Screening     3 most recent PHQ Screens 1/18/2023   Little interest or pleasure in doing things Not at all   Feeling down, depressed, irritable, or hopeless Not at all   Total Score PHQ 2 0       Alcohol & Drug Abuse Risk Screen    Do you average more than 1 drink per night or more than 7 drinks a week:  No    On any one occasion in the past three months have you have had more than 3 drinks containing alcohol:  No    Non smoker        Functional Ability and Level of Safety    Hearing: Hearing is good. Vision:  Needs exam and has it scheduled. Dental: Up to date. Activities of Daily Living: The home contains: handrails and grab bars  Patient does total self care      Ambulation: with mild difficulty back and leg      Fall Risk:  Fall Risk Assessment, last 12 mths 1/18/2023   Able to walk? Yes   Fall in past 12 months? 0   Do you feel unsteady?  0   Are you worried about falling 0      Abuse Screen:  Patient is not abused       Cognitive Screening    Has your family/caregiver stated any concerns about your memory: no     Cognitive Screening: Normal - interview    Health Maintenance Due     Health Maintenance Due   Topic Date Due    COVID-19 Vaccine (3 - Booster for Moderna series) 05/26/2021       Patient Care Team   Patient Care Team:  Satish Garcia MD as PCP - General (Family Medicine)  Satish Garcia MD as PCP - [facility] Empaneled Provider  Lisandro Swain MD (Neurology)    History     Patient Active Problem List   Diagnosis Code    HTN (hypertension) I10    Hyperlipidemia E78.5    Chronic pain G89.29    Leukocytosis, unspecified D72.829    Anemia, unspecified D64.9    Dehydration E86.0    Renal failure, acute ([address] N17.9    Altered mental status R41.82    Stenosis of lumbosacral spine M48.07    Hyponatremia E87.1    Hyperkalemia E87.5    Acidosis C03.03    Metabolic encephalopathy D73.24    Insomnia G47.00    Acute hypoxemic respiratory failure due to COVID-19 ([address] U07.1, J96.01    UTI (urinary tract infection) N39.0    Migraine G43.909    Osteoporosis M81.0    Obesity E66.9    Acute respiratory failure with hypoxia ([address] J96.01    COVID-19 U07.1    Chronic renal disease, stage III N18.30     Past Medical History:   Diagnosis Date    Aneurysm ([address]     splenic artery aneurysm - no longer needs to be followed up - will never be a problem    Chronic pain     back    CKD (chronic kidney disease) stage 3, GFR 30-59 ml/min ([facility])     s/p embolization or left renal artery for bleeding after staghorn calculus surgery 2/2022    COVID-19     Hypoxic respiratory failure. Hospitalized 12/26/2020-12/29/2020. Gastrointestinal bleeding     GI bleed - stomach - NSAID use    History of vascular access device 12/28/2020    4 Fr midilne, 10 cm L basilic, poor access    Hyperlipidemia     Hypertension     Migraine     Osteoporosis     By DEXA 2019    Other ill-defined conditions(799.89)     wheezing with allergies    Overactive bladder     PUD (peptic ulcer disease)     Spinal stenosis of lumbar region     Thromboembolus [facility])     PE's in her 42's      Past Surgical History:   Procedure Laterality Date    COLONOSCOPY N/A 10/1/2018    COLONOSCOPY performed by Greg Baez MD at [address] [facility]      HX ORTHOPAEDIC  2009    spinal fusion/has not had a lumbar laminectomy    HX ORTHOPAEDIC Bilateral     bunionectomy - bilateral once and unilateral once    HX ORTHOPAEDIC Bilateral     carpal tunnel release    HX TONSILLECTOMY      T & A    HX UROLOGICAL  2012    bladder repair    HX UROLOGICAL  02/07/2022 2/7/2022 removal staghorn calculus. Subsequent hemorrhage requiring transfusion and embolization of left renal arteries. Current Outpatient Medications   Medication Sig Dispense Refill    clonazePAM (KlonoPIN) 0.5 mg tablet Take 1 Tablet by mouth nightly as needed for Anxiety. Max Daily Amount: 0.5 mg. 30 Tablet 2    alendronate (FOSAMAX) 70 mg tablet TAKE 1 TABLET BY MOUTH EVERY 7 DAYS 12 Tablet 1    metoprolol tartrate (LOPRESSOR) 25 mg tablet TAKE 1 TABLET BY MOUTH TWICE DAILY 180 Tablet 1    nystatin (MYCOSTATIN) powder Apply  to affected area four (4) times daily. 60 g 2    glycerin-min oil-polycarbophil (Replens) gel Use nightly. Topical use only. Put thin layer in vulvar area.  35 g [address] [facility] pen injection 140 mg Once every 2 weeks. 6    KRILL OIL PO Take 500 mg by mouth daily. multivitamin (MULTIPLE VITAMINS PO) Take 1 Tab by mouth daily. Lactobacillus acidophilus (ACIDOPHILUS PO) Take 1 Cap by mouth daily. cholecalciferol, vitamin D3, 2,000 unit tab Take 2,000 Units by mouth daily. turmeric (CURCUMIN) Take 500 mg by mouth daily. acetaminophen (TYLENOL EXTRA STRENGTH PO) Take 500 mg by mouth as needed. Takes 2 po as needed for leg or back pain. cetirizine (ZYRTEC) 10 mg tablet Take 10 mg by mouth daily. Allergies   Allergen Reactions    Ciprofloxacin Unknown (comments)     Joint inflammation      Ketorolac Unable to Obtain    Nitrofurantoin Other (comments)     LOW WBC - 2000    Nitrofurantoin Macrocrystal Unable to Obtain    Nsaids (Non-Steroidal Anti-Inflammatory Drug) Other (comments)     GI bleed. Other Plant, Animal, Environmental Other (comments)     Mold, dust, cats, dog allergies. ENVIRONMENTAL ALLERGIES.     Statins-Hmg-Coa Reductase Inhibitors Other (comments)     LFT increases    Tolmetin Unknown (comments)    Toradol [Ketorolac Tromethamine] Rash       Family History   Problem Relation Age of Onset    Cancer Father         bladder    Cancer Brother         BCCA    Stroke Mother     Cancer Maternal Uncle         leukemia    Breast Cancer Paternal Aunt     Stroke Maternal Grandmother     Colon Cancer Paternal Grandfather     Cancer Maternal Uncle         throat     Social History     Tobacco Use    Smoking status: Never    Smokeless tobacco: Never   Substance Use Topics    Alcohol use: Not Currently     Comment: very, very rare         Fabricio Hill MD

## 2023-01-18 NOTE — PATIENT INSTRUCTIONS
Medicare Wellness Visit, Female     The best way to live healthy is to have a lifestyle where you eat a well-balanced diet, exercise regularly, limit alcohol use, and quit all forms of tobacco/nicotine, if applicable. Regular preventive services are another way to keep healthy. Preventive services (vaccines, screening tests, monitoring & exams) can help personalize your care plan, which helps you manage your own care. Screening tests can find health problems at the earliest stages, when they are easiest to treat. [facility] follows the current, evidence-based guidelines published by the [facility] Spike Wu ([facility]) when recommending preventive services for our patients. Because we follow these guidelines, sometimes recommendations change over time as research supports it. (For example, mammograms used to be recommended annually. Even though Medicare will still pay for an annual mammogram, the newer guidelines recommend a mammogram every two years for women of average risk). Of course, you and your doctor may decide to screen more often for some diseases, based on your risk and your co-morbidities (chronic disease you are already diagnosed with). Preventive services for you include:  - Medicare offers their members a free annual wellness visit, which is time for you and your primary care provider to discuss and plan for your preventive service needs.  Take advantage of this benefit every year!    -Over the age of 72 should receive the recommended pneumonia vaccines.    -All adults should have a flu vaccine yearly.  -All adults should have a tetanus vaccine every 10 years.   -Over the age 48 should receive the shingles vaccines.        -All adults should be screened once for Hepatitis C.  -All adults age 38-68 who are overweight should have a diabetes screening test once every three years.   -Other screening tests and preventive services for persons with diabetes include: an eye exam to screen for diabetic retinopathy, a kidney function test, a foot exam, and stricter control over your cholesterol.   -Cardiovascular screening for adults with routine risk involves an electrocardiogram (ECG) at intervals determined by your doctor.     -Colorectal cancer screenings should be done for adults age 39-70 with no increased risk factors for colorectal cancer. There are a number of acceptable methods of screening for this type of cancer. Each test has its own benefits and drawbacks. Discuss with your doctor what is most appropriate for you during your annual wellness visit. The different tests include: colonoscopy (considered the best screening method), a fecal occult blood test, a fecal DNA test, and sigmoidoscopy.    -Lung cancer screening is recommended annually with a low dose CT scan for adults between age 54 and 68, who have smoked at least 30 pack years (equivalent of 1 pack per day for 30 days), and who is a current smoker or quit less than 15 years ago.    -A bone mass density test is recommended when a woman turns 65 to screen for osteoporosis. This test is only recommended one time, as a screening. Some providers will use this same test as a disease monitoring tool if you already have osteoporosis. -Breast cancer screenings are recommended every other year for women of normal risk, age 54-69.    -Cervical cancer screenings for women over age 72 are only recommended with certain risk factors.      Here is a list of your current Health Maintenance items (your personalized list of preventive services) with a due date:  Health Maintenance Due   Topic Date Due    COVID-19 Vaccine (3 - Booster for [place] series) 05/26/2021

## 2023-01-24 ENCOUNTER — HOSPITAL ENCOUNTER (OUTPATIENT)
Dept: MAMMOGRAPHY | Age: 73
Discharge: HOME | End: 2023-01-24
Attending: FAMILY MEDICINE
Payer: MEDICARE

## 2023-01-24 DIAGNOSIS — Z12.31 VISIT FOR SCREENING MAMMOGRAM: ICD-10-CM

## 2023-01-24 PROCEDURE — 77067 SCR MAMMO BI INCL CAD: CPT

## 2023-03-07 ENCOUNTER — TELEPHONE (OUTPATIENT)
Dept: FAMILY MEDICINE CLINIC | Age: 73
End: 2023-03-07

## 2023-03-07 NOTE — TELEPHONE ENCOUNTER
----- Message from Marguerite Jessica sent at 3/3/2023  1:04 PM EST -----  Subject: Message to Provider    QUESTIONS  Information for Provider? New sleeping meds are not working. Would like   the old one prescribed again. Wants whole, dose not half. [facility]   pharmacy. Please call patient. Leaving at 3:15pm  ---------------------------------------------------------------------------  --------------  Jamison Duane INFO  [phone]; OK to leave message on voicemail  ---------------------------------------------------------------------------  --------------  SCRIPT ANSWERS  Relationship to Patient?  Self

## 2023-03-08 ENCOUNTER — VIRTUAL VISIT (OUTPATIENT)
Dept: FAMILY MEDICINE CLINIC | Age: 73
End: 2023-03-08
Payer: MEDICARE

## 2023-03-08 DIAGNOSIS — G47.00 INSOMNIA, UNSPECIFIED TYPE: Primary | ICD-10-CM

## 2023-03-08 PROCEDURE — 1101F PT FALLS ASSESS-DOCD LE1/YR: CPT | Performed by: FAMILY MEDICINE

## 2023-03-08 PROCEDURE — 99442 PR PHYS/QHP TELEPHONE EVALUATION 11-20 MIN: CPT | Performed by: FAMILY MEDICINE

## 2023-03-08 RX ORDER — ZOLPIDEM TARTRATE 10 MG/1
10 TABLET ORAL
Qty: 30 TABLET | Refills: 2 | Status: SHIPPED | OUTPATIENT
Start: 2023-03-08

## 2023-03-08 NOTE — PROGRESS NOTES
Gerhard Carter is a 74 y.o. female evaluated via telephone on 3/8/2023. Consent:  She and/or health care decision maker is aware that she may receive a bill for this telephone service, depending on her insurance coverage, and has provided verbal consent to proceed: Yes      Documentation:  I communicated with the patient and/or health care decision maker about insomnia. Details of this discussion including any medical advice provided:     Insomnia  Did not feel clonazepam worked  Did not work as well as Yuri Allen. Has been having migraines now for about a year  Previously on 10 mg of ambien from [address] [place] [address] for insomnia associated with migraines. Has AM migraines without the md    Assessment and Plan:    1. Insomnia, unspecified type  STOP clonazepam  Resume zolpidem   is OK  Warned of amnestic episodes  Do not mix with other sleep aids  - zolpidem (AMBIEN) 10 mg tablet; Take 1 Tablet by mouth nightly as needed for Sleep. Max Daily Amount: 10 mg. Dispense: 30 Tablet; Refill: 2    Follow-up and Dispositions    Return in about 3 months (around 6/8/2023) for Medication follow up. I affirm this is a Patient Initiated Episode with a Patient who has not had a related appointment within my department in the past 7 days or scheduled within the next 24 hours. Total Time: minutes: 11-20 minutes    Note: not billable if this call serves to triage the patient into an appointment for the relevant concern    The patient was at home and I was in office for this phone visit.     Mark Melchor MD

## 2023-03-08 NOTE — PROGRESS NOTES
1. Have you been to the ER, urgent care clinic since your last visit? Hospitalized since your last visit? No    2. Have you seen or consulted any other health care providers outside of the [address] [place] [address] since your last visit? Include any pap smears or colon screening. No    Chief Complaint   Patient presents with    Discuss Medications    Sleep Problem     3 most recent PHQ Screens 3/8/2023   Little interest or pleasure in doing things Not at all   Feeling down, depressed, irritable, or hopeless Not at all   Total Score PHQ 2 0     Abuse Screening Questionnaire 3/8/2023   Do you ever feel afraid of your partner? N   Are you in a relationship with someone who physically or mentally threatens you? N   Is it safe for you to go home?  Y     Health Maintenance Due   Topic Date Due    COVID-19 Vaccine (3 - Booster for Moderna series) 05/26/2021     Patient-Reported Vitals 3/8/2023   Patient-Reported Weight 173lb   Patient-Reported Pulse -   Patient-Reported Temperature -   Patient-Reported SpO2 -   Patient-Reported Systolic  -   Patient-Reported Diastolic -   Patient-Reported Peak Flow -     Learning Assessment 3/8/2019   PRIMARY LEARNER Patient   PRIMARY LANGUAGE ENGLISH   LEARNER PREFERENCE PRIMARY READING   ANSWERED BY patient   RELATIONSHIP SELF

## 2023-05-24 ENCOUNTER — OFFICE VISIT (OUTPATIENT)
Facility: CLINIC | Age: 73
End: 2023-05-24
Payer: MEDICARE

## 2023-05-24 ENCOUNTER — TELEPHONE (OUTPATIENT)
Dept: FAMILY MEDICINE CLINIC | Age: 73
End: 2023-05-24

## 2023-05-24 VITALS
BODY MASS INDEX: 33.77 KG/M2 | DIASTOLIC BLOOD PRESSURE: 75 MMHG | OXYGEN SATURATION: 95 % | RESPIRATION RATE: 16 BRPM | HEIGHT: 60 IN | HEART RATE: 73 BPM | WEIGHT: 172 LBS | SYSTOLIC BLOOD PRESSURE: 126 MMHG | TEMPERATURE: 97.8 F

## 2023-05-24 DIAGNOSIS — G47.00 INSOMNIA, UNSPECIFIED TYPE: ICD-10-CM

## 2023-05-24 DIAGNOSIS — G43.909 MIGRAINE WITHOUT STATUS MIGRAINOSUS, NOT INTRACTABLE, UNSPECIFIED MIGRAINE TYPE: Primary | ICD-10-CM

## 2023-05-24 PROCEDURE — 1123F ACP DISCUSS/DSCN MKR DOCD: CPT | Performed by: NURSE PRACTITIONER

## 2023-05-24 PROCEDURE — G8399 PT W/DXA RESULTS DOCUMENT: HCPCS | Performed by: NURSE PRACTITIONER

## 2023-05-24 PROCEDURE — 3074F SYST BP LT 130 MM HG: CPT | Performed by: NURSE PRACTITIONER

## 2023-05-24 PROCEDURE — 4004F PT TOBACCO SCREEN RCVD TLK: CPT | Performed by: NURSE PRACTITIONER

## 2023-05-24 PROCEDURE — 99213 OFFICE O/P EST LOW 20 MIN: CPT | Performed by: NURSE PRACTITIONER

## 2023-05-24 PROCEDURE — 1090F PRES/ABSN URINE INCON ASSESS: CPT | Performed by: NURSE PRACTITIONER

## 2023-05-24 PROCEDURE — G8417 CALC BMI ABV UP PARAM F/U: HCPCS | Performed by: NURSE PRACTITIONER

## 2023-05-24 PROCEDURE — 3078F DIAST BP <80 MM HG: CPT | Performed by: NURSE PRACTITIONER

## 2023-05-24 PROCEDURE — 3017F COLORECTAL CA SCREEN DOC REV: CPT | Performed by: NURSE PRACTITIONER

## 2023-05-24 PROCEDURE — G8427 DOCREV CUR MEDS BY ELIG CLIN: HCPCS | Performed by: NURSE PRACTITIONER

## 2023-05-24 RX ORDER — BACILLUS COAGULANS/INULIN 1B-250 MG
CAPSULE ORAL
COMMUNITY

## 2023-05-24 RX ORDER — RIZATRIPTAN BENZOATE 5 MG/1
10 TABLET, ORALLY DISINTEGRATING ORAL
Qty: 30 TABLET | Refills: 2 | Status: SHIPPED | OUTPATIENT
Start: 2023-05-24 | End: 2023-05-24

## 2023-05-24 RX ORDER — ZOLPIDEM TARTRATE 10 MG/1
10 TABLET ORAL NIGHTLY PRN
Qty: 30 TABLET | Refills: 2 | Status: SHIPPED | OUTPATIENT
Start: 2023-05-24 | End: 2023-06-23

## 2023-05-24 RX ORDER — RIZATRIPTAN BENZOATE 5 MG/1
10 TABLET, ORALLY DISINTEGRATING ORAL
COMMUNITY
Start: 2018-06-15 | End: 2023-05-24 | Stop reason: SDUPTHER

## 2023-05-24 RX ORDER — UBIDECARENONE 100 MG
200 CAPSULE ORAL
COMMUNITY

## 2023-05-24 SDOH — ECONOMIC STABILITY: FOOD INSECURITY: WITHIN THE PAST 12 MONTHS, YOU WORRIED THAT YOUR FOOD WOULD RUN OUT BEFORE YOU GOT MONEY TO BUY MORE.: NEVER TRUE

## 2023-05-24 SDOH — ECONOMIC STABILITY: INCOME INSECURITY: HOW HARD IS IT FOR YOU TO PAY FOR THE VERY BASICS LIKE FOOD, HOUSING, MEDICAL CARE, AND HEATING?: NOT HARD AT ALL

## 2023-05-24 SDOH — ECONOMIC STABILITY: FOOD INSECURITY: WITHIN THE PAST 12 MONTHS, THE FOOD YOU BOUGHT JUST DIDN'T LAST AND YOU DIDN'T HAVE MONEY TO GET MORE.: NEVER TRUE

## 2023-05-24 SDOH — ECONOMIC STABILITY: HOUSING INSECURITY
IN THE LAST 12 MONTHS, WAS THERE A TIME WHEN YOU DID NOT HAVE A STEADY PLACE TO SLEEP OR SLEPT IN A SHELTER (INCLUDING NOW)?: NO

## 2023-05-24 ASSESSMENT — PATIENT HEALTH QUESTIONNAIRE - PHQ9
SUM OF ALL RESPONSES TO PHQ QUESTIONS 1-9: 0
SUM OF ALL RESPONSES TO PHQ QUESTIONS 1-9: 0
2. FEELING DOWN, DEPRESSED OR HOPELESS: 0
SUM OF ALL RESPONSES TO PHQ9 QUESTIONS 1 & 2: 0
1. LITTLE INTEREST OR PLEASURE IN DOING THINGS: 0
SUM OF ALL RESPONSES TO PHQ QUESTIONS 1-9: 0
SUM OF ALL RESPONSES TO PHQ QUESTIONS 1-9: 0

## 2023-05-24 ASSESSMENT — ENCOUNTER SYMPTOMS
GASTROINTESTINAL NEGATIVE: 1
EYES NEGATIVE: 1
RESPIRATORY NEGATIVE: 1
ALLERGIC/IMMUNOLOGIC NEGATIVE: 1

## 2023-05-24 NOTE — PROGRESS NOTES
Assessment/Plan:     Getachew Singer was seen today for medication refill. Diagnoses and all orders for this visit:    Migraine without status migrainosus, not intractable, unspecified migraine type  -     rizatriptan (MAXALT-MLT) 5 MG disintegrating tablet; Take 2 tablets by mouth once as needed for Migraine (max one daily as needed)  Seen in office for medication refill. Reports doing well migraines are controlled with medication at current dose. Insomnia, unspecified type  -     zolpidem (AMBIEN) 10 MG tablet; Take 1 tablet by mouth nightly as needed for Sleep for up to 30 days. Max Daily Amount: 10 mg  Medication refill. PDMP checked. Patient has been prescribed this medication long-term. Reports doing well at dose. Advised no driving and use caution when ambulating after taking medication due to fall risk. Patient understands risks with medication and has had no complications. No follow-up provider specified. Discussed expected course/resolution/complications of diagnosis in detail with patient. Medication risks/benefits/costs/interactions/alternatives discussed with patient. Pt was given after visit summary which includes diagnoses, current medications & vitals. Pt expressed understanding with the diagnosis and plan        Subjective:      Aura Rangel is a 74 y.o. female who presents for had concerns including Medication Refill ([facility] 6/4/2023). Current Outpatient Medications   Medication Sig Dispense Refill    coenzyme Q10 100 MG CAPS capsule Take 2 capsules by mouth      Bacillus Coagulans-Inulin (PROBIOTIC) 1-250 BILLION-MG CAPS Probiotic      rizatriptan (MAXALT-MLT) 5 MG disintegrating tablet Take 2 tablets by mouth once as needed for Migraine (max one daily as needed) 30 tablet 2    zolpidem (AMBIEN) 10 MG tablet Take 1 tablet by mouth nightly as needed for Sleep for up to 30 days.  Max Daily Amount: 10 mg 30 tablet 2    KRILL OIL PO Take by mouth daily      alendronate

## 2023-05-24 NOTE — PROGRESS NOTES
1. Have you been to the ER, urgent care clinic since your last visit? Hospitalized since your last visit? No    2. Have you seen or consulted any other health care providers outside of the [address] [place] [address] since your last visit? Include any pap smears or colon screening. No  Chief Complaint   Patient presents with    Medication Refill     Leaving [facility] 6/4/2023       Health Maintenance Due   Topic Date Due    COVID-19 Vaccine (3 - Booster for Moderna series) 05/26/2021     PHQ-9 Total Score: 0 (5/24/2023 11:03 AM)    AMB Abuse Screening 5/24/2023   Do you ever feel afraid of your partner? N   Are you in a relationship with someone who physically or mentally threatens you? N   Is it safe for you to go home? Y     Vitals:    05/24/23 1108   BP: 126/75   Pulse: 73   Resp: 16   Temp: 97.8 °F (36.6 °C)   SpO2: 95%     Amb Fall Risk Assessment and TUG Test 5/24/2023   Do you feel unsteady or are you worried about falling?  no   2 or more falls in past year? no   Fall with injury in past year? no   Fall in past 12 months? -   Able to walk?  -

## 2023-05-26 ENCOUNTER — TELEPHONE (OUTPATIENT)
Facility: CLINIC | Age: 73
End: 2023-05-26

## 2023-06-19 NOTE — TELEPHONE ENCOUNTER
Farhat Chang MD      Orders Only on 05/16/2023   Component Date Value Ref Range Status    SARS-CoV-2 11/05/2022 Not Detected   Final     Health Maintenance Due   Topic Date Due    COVID-19 Vaccine (3 - Booster for Moderna series) 05/26/2021

## 2023-10-17 ENCOUNTER — TELEPHONE (OUTPATIENT)
Facility: CLINIC | Age: 73
End: 2023-10-17

## 2023-10-17 NOTE — TELEPHONE ENCOUNTER
Received phone call about medication concerns with patient. Called patient back. Patient states the pharmacy needed clarification on the rizatriptan medication due to the way the prescription was written. Patient currently having a migraine. She states it is her typical migraine presentation. She denies any neurological signs or symptoms. She is aware of ED precautions. Pharmacy called and clarified that rizatriptan will be 2 tablets of the 5 mg tabs by mouth once daily as needed for migraine.   This is to total 10 mg dosage which patient has been on in the past.

## 2023-10-25 RX ORDER — ALENDRONATE SODIUM 70 MG/1
TABLET ORAL
Qty: 12 TABLET | Refills: 0 | Status: SHIPPED | OUTPATIENT
Start: 2023-10-25

## 2023-10-25 NOTE — TELEPHONE ENCOUNTER
Call patient. I refilled their medication but they are due to be seen in the office. Reason:  Due for bone density testing.

## 2023-10-25 NOTE — TELEPHONE ENCOUNTER
PCP: Stephenie Bang MD    Last appt: [unfilled]  Patient was last seen on 5/24/2023  Future Appointments   Date Time Provider [address]   2/29/2024 12:00 PM Robbie, Jodie Oliver MD NEUROWTCR[facility] BS AMB       Requested Prescriptions     Pending Prescriptions Disp Refills    alendronate (FOSAMAX) 70 MG tablet [Pharmacy Med Name: ALENDRONATE 70MG TABLETS] 12 tablet      Sig: TAKE 1 TABLET BY MOUTH EVERY 7 DAYS       Prior labs and Blood pressures:  BP Readings from Last 3 Encounters:   05/24/23 126/75   01/18/23 130/77   10/18/22 124/79     Lab Results   Component Value Date/Time     07/25/2022 09:03 AM    K 5.0 07/25/2022 09:03 AM     07/25/2022 09:03 AM    CO2 27 07/25/2022 09:03 AM    BUN 30 07/25/2022 09:03 AM    GFRAA 59 07/25/2022 09:03 AM     No results found for: \"HBA1C\", \"PPP5UVOS\"  Lab Results   Component Value Date/Time    CHOL 138 07/25/2022 09:03 AM    HDL 74 07/25/2022 09:03 AM     No results found for: \"VITD3\", \"VD3RIA\"    Lab Results   Component Value Date/Time    TSH 1.51 07/25/2022 09:03 AM

## 2024-01-09 ENCOUNTER — TELEPHONE (OUTPATIENT)
Age: 74
End: 2024-01-09

## 2024-01-09 NOTE — TELEPHONE ENCOUNTER
Call placed to pt twice and left VM for call back to confirm appt on 1/11 is for a new patient appt.

## 2024-01-11 ENCOUNTER — OFFICE VISIT (OUTPATIENT)
Age: 74
End: 2024-01-11
Payer: MEDICARE

## 2024-01-11 VITALS
SYSTOLIC BLOOD PRESSURE: 143 MMHG | HEART RATE: 71 BPM | BODY MASS INDEX: 33.57 KG/M2 | OXYGEN SATURATION: 98 % | RESPIRATION RATE: 17 BRPM | DIASTOLIC BLOOD PRESSURE: 80 MMHG | TEMPERATURE: 98 F | HEIGHT: 60 IN | WEIGHT: 171 LBS

## 2024-01-11 DIAGNOSIS — R73.03 PRE-DIABETES: ICD-10-CM

## 2024-01-11 DIAGNOSIS — N18.31 CHRONIC KIDNEY DISEASE, STAGE 3A (HCC): ICD-10-CM

## 2024-01-11 DIAGNOSIS — Z00.00 MEDICARE ANNUAL WELLNESS VISIT, SUBSEQUENT: ICD-10-CM

## 2024-01-11 DIAGNOSIS — N18.31 CHRONIC KIDNEY DISEASE, STAGE 3A (HCC): Primary | ICD-10-CM

## 2024-01-11 DIAGNOSIS — Z00.00 ENCOUNTER FOR MEDICAL EXAMINATION TO ESTABLISH CARE: ICD-10-CM

## 2024-01-11 DIAGNOSIS — I10 HYPERTENSION, UNSPECIFIED TYPE: ICD-10-CM

## 2024-01-11 DIAGNOSIS — E55.9 VITAMIN D DEFICIENCY: ICD-10-CM

## 2024-01-11 DIAGNOSIS — G47.00 INSOMNIA, UNSPECIFIED TYPE: ICD-10-CM

## 2024-01-11 DIAGNOSIS — F41.9 ANXIETY: ICD-10-CM

## 2024-01-11 DIAGNOSIS — E78.2 MIXED HYPERLIPIDEMIA: ICD-10-CM

## 2024-01-11 PROCEDURE — G0439 PPPS, SUBSEQ VISIT: HCPCS | Performed by: INTERNAL MEDICINE

## 2024-01-11 PROCEDURE — 99203 OFFICE O/P NEW LOW 30 MIN: CPT | Performed by: INTERNAL MEDICINE

## 2024-01-11 PROCEDURE — G8417 CALC BMI ABV UP PARAM F/U: HCPCS | Performed by: INTERNAL MEDICINE

## 2024-01-11 PROCEDURE — G8399 PT W/DXA RESULTS DOCUMENT: HCPCS | Performed by: INTERNAL MEDICINE

## 2024-01-11 PROCEDURE — 3017F COLORECTAL CA SCREEN DOC REV: CPT | Performed by: INTERNAL MEDICINE

## 2024-01-11 PROCEDURE — 1036F TOBACCO NON-USER: CPT | Performed by: INTERNAL MEDICINE

## 2024-01-11 PROCEDURE — G8484 FLU IMMUNIZE NO ADMIN: HCPCS | Performed by: INTERNAL MEDICINE

## 2024-01-11 PROCEDURE — 1123F ACP DISCUSS/DSCN MKR DOCD: CPT | Performed by: INTERNAL MEDICINE

## 2024-01-11 PROCEDURE — 3077F SYST BP >= 140 MM HG: CPT | Performed by: INTERNAL MEDICINE

## 2024-01-11 PROCEDURE — G8427 DOCREV CUR MEDS BY ELIG CLIN: HCPCS | Performed by: INTERNAL MEDICINE

## 2024-01-11 PROCEDURE — 1090F PRES/ABSN URINE INCON ASSESS: CPT | Performed by: INTERNAL MEDICINE

## 2024-01-11 PROCEDURE — 3079F DIAST BP 80-89 MM HG: CPT | Performed by: INTERNAL MEDICINE

## 2024-01-11 RX ORDER — DULOXETIN HYDROCHLORIDE 20 MG/1
20 CAPSULE, DELAYED RELEASE ORAL DAILY
Qty: 60 CAPSULE | Refills: 1 | Status: SHIPPED | OUTPATIENT
Start: 2024-01-11

## 2024-01-11 RX ORDER — ZOLPIDEM TARTRATE 10 MG/1
TABLET ORAL NIGHTLY PRN
COMMUNITY
End: 2024-01-11 | Stop reason: SDUPTHER

## 2024-01-11 RX ORDER — ESTRADIOL 0.1 MG/G
2 CREAM VAGINAL DAILY
COMMUNITY

## 2024-01-11 RX ORDER — ZOLPIDEM TARTRATE 10 MG/1
10 TABLET ORAL NIGHTLY PRN
Qty: 30 TABLET | Refills: 0 | Status: SHIPPED | OUTPATIENT
Start: 2024-01-11 | End: 2024-02-10

## 2024-01-11 ASSESSMENT — PATIENT HEALTH QUESTIONNAIRE - PHQ9
SUM OF ALL RESPONSES TO PHQ QUESTIONS 1-9: 2
SUM OF ALL RESPONSES TO PHQ QUESTIONS 1-9: 2
SUM OF ALL RESPONSES TO PHQ9 QUESTIONS 1 & 2: 2
1. LITTLE INTEREST OR PLEASURE IN DOING THINGS: 1
SUM OF ALL RESPONSES TO PHQ QUESTIONS 1-9: 2
2. FEELING DOWN, DEPRESSED OR HOPELESS: 1
SUM OF ALL RESPONSES TO PHQ QUESTIONS 1-9: 2

## 2024-01-11 ASSESSMENT — ENCOUNTER SYMPTOMS
RESPIRATORY NEGATIVE: 1
GASTROINTESTINAL NEGATIVE: 1
BACK PAIN: 1

## 2024-01-11 NOTE — PROGRESS NOTES
1. \"Have you been to the ER, urgent care clinic since your last visit?  Hospitalized since your last visit?\" No    2. \"Have you seen or consulted any other health care providers outside of the [facility] since your last visit?\" New patient    3. For patients aged 45-75: Has the patient had a colonoscopy / FIT/ Cologuard? Recommendation: Colonoscopy every 10y or annual FIT test from 50-75 or every 3 year stool DNA based test with consideration of ongoing screening from 76-85. and Up to date or Completed      If the patient is female:    4. For patients aged 40-74: Has the patient had a mammogram within the past 2 years? Yes    5. For patients aged 21-65: Has the patient had a pap smear? No    
Hemoglobin A1C; Future  -     CBC with Auto Differential; Future    Vitamin D deficiency  -     Vitamin D 25 Hydroxy; Future    Anxiety  -     DULoxetine (CYMBALTA) 20 MG extended release capsule; Take 1 capsule by mouth daily      Reviewed with patient medication side effects in detail   I answered all patient questions and concerns  Labs and testing done and/or upcoming labs/test were reviewed and discussed   Reviewed and discussed daily activity, exercise and diet      Return in about 6 weeks (around 2/22/2024) for follow up if symptoms persist, follow up for routine visit or before if needed.     Parisa Lucero, APRN - NP

## 2024-02-07 LAB
BASOPHILS # BLD AUTO: 0.1 X10E3/UL (ref 0–0.2)
BASOPHILS NFR BLD AUTO: 1 %
EOSINOPHIL # BLD AUTO: 0.4 X10E3/UL (ref 0–0.4)
EOSINOPHIL NFR BLD AUTO: 3 %
ERYTHROCYTE [DISTWIDTH] IN BLOOD BY AUTOMATED COUNT: 12.4 % (ref 11.7–15.4)
HBA1C MFR BLD: 5.7 % (ref 4.8–5.6)
HCT VFR BLD AUTO: 41.6 % (ref 34–46.6)
HGB BLD-MCNC: 13.8 G/DL (ref 11.1–15.9)
IMM GRANULOCYTES # BLD AUTO: 0 X10E3/UL (ref 0–0.1)
IMM GRANULOCYTES NFR BLD AUTO: 0 %
LYMPHOCYTES # BLD AUTO: 6.3 X10E3/UL (ref 0.7–3.1)
LYMPHOCYTES NFR BLD AUTO: 53 %
MCH RBC QN AUTO: 29.1 PG (ref 26.6–33)
MCHC RBC AUTO-ENTMCNC: 33.2 G/DL (ref 31.5–35.7)
MCV RBC AUTO: 88 FL (ref 79–97)
MONOCYTES # BLD AUTO: 0.6 X10E3/UL (ref 0.1–0.9)
MONOCYTES NFR BLD AUTO: 5 %
NEUTROPHILS # BLD AUTO: 4.6 X10E3/UL (ref 1.4–7)
NEUTROPHILS NFR BLD AUTO: 38 %
PLATELET # BLD AUTO: 267 X10E3/UL (ref 150–450)
RBC # BLD AUTO: 4.75 X10E6/UL (ref 3.77–5.28)
WBC # BLD AUTO: 12 X10E3/UL (ref 3.4–10.8)

## 2024-02-08 LAB
25(OH)D3+25(OH)D2 SERPL-MCNC: 51.5 NG/ML (ref 30–100)
TSH SERPL DL<=0.005 MIU/L-ACNC: 1.04 UIU/ML (ref 0.45–4.5)

## 2024-02-10 LAB
ALBUMIN/CREAT UR: 17 MG/G CREAT (ref 0–29)
CREAT UR-MCNC: 55.1 MG/DL
MICROALBUMIN UR-MCNC: 9.4 UG/ML

## 2024-02-12 ENCOUNTER — TELEPHONE (OUTPATIENT)
Age: 74
End: 2024-02-12

## 2024-02-12 NOTE — TELEPHONE ENCOUNTER
Pt would like his previous medical records requested and faxed over to  from Dr Jermaine Sheppard office  [facility]. [address], [address]. [place] [address]. Call Office. Phone: [phone]

## 2024-02-12 NOTE — TELEPHONE ENCOUNTER
Most recent records faxed to Dr Sheppard's office for patient's  for patient's upcoming appointment.

## 2024-02-13 ENCOUNTER — OFFICE VISIT (OUTPATIENT)
Age: 74
End: 2024-02-13
Payer: MEDICARE

## 2024-02-13 VITALS
OXYGEN SATURATION: 96 % | RESPIRATION RATE: 18 BRPM | DIASTOLIC BLOOD PRESSURE: 84 MMHG | HEIGHT: 60 IN | BODY MASS INDEX: 32.43 KG/M2 | SYSTOLIC BLOOD PRESSURE: 136 MMHG | HEART RATE: 75 BPM | WEIGHT: 165.2 LBS

## 2024-02-13 DIAGNOSIS — I70.90 ATHEROSCLEROSIS: ICD-10-CM

## 2024-02-13 DIAGNOSIS — E78.5 DYSLIPIDEMIA: ICD-10-CM

## 2024-02-13 DIAGNOSIS — I10 PRIMARY HYPERTENSION: Primary | ICD-10-CM

## 2024-02-13 PROCEDURE — 93010 ELECTROCARDIOGRAM REPORT: CPT | Performed by: SPECIALIST

## 2024-02-13 PROCEDURE — 3017F COLORECTAL CA SCREEN DOC REV: CPT | Performed by: SPECIALIST

## 2024-02-13 PROCEDURE — G8427 DOCREV CUR MEDS BY ELIG CLIN: HCPCS | Performed by: SPECIALIST

## 2024-02-13 PROCEDURE — 3079F DIAST BP 80-89 MM HG: CPT | Performed by: SPECIALIST

## 2024-02-13 PROCEDURE — 1036F TOBACCO NON-USER: CPT | Performed by: SPECIALIST

## 2024-02-13 PROCEDURE — 99204 OFFICE O/P NEW MOD 45 MIN: CPT | Performed by: SPECIALIST

## 2024-02-13 PROCEDURE — 3075F SYST BP GE 130 - 139MM HG: CPT | Performed by: SPECIALIST

## 2024-02-13 PROCEDURE — 1123F ACP DISCUSS/DSCN MKR DOCD: CPT | Performed by: SPECIALIST

## 2024-02-13 PROCEDURE — G8399 PT W/DXA RESULTS DOCUMENT: HCPCS | Performed by: SPECIALIST

## 2024-02-13 PROCEDURE — G8417 CALC BMI ABV UP PARAM F/U: HCPCS | Performed by: SPECIALIST

## 2024-02-13 PROCEDURE — G8484 FLU IMMUNIZE NO ADMIN: HCPCS | Performed by: SPECIALIST

## 2024-02-13 PROCEDURE — 93005 ELECTROCARDIOGRAM TRACING: CPT | Performed by: SPECIALIST

## 2024-02-13 PROCEDURE — 1090F PRES/ABSN URINE INCON ASSESS: CPT | Performed by: SPECIALIST

## 2024-02-13 RX ORDER — EZETIMIBE 10 MG/1
10 TABLET ORAL DAILY
Qty: 90 TABLET | Refills: 3 | Status: SHIPPED | OUTPATIENT
Start: 2024-02-13

## 2024-02-13 RX ORDER — ZOLPIDEM TARTRATE 10 MG/1
TABLET ORAL
COMMUNITY
End: 2024-02-16

## 2024-02-13 NOTE — PROGRESS NOTES
Chief Complaint   Patient presents with    New Patient     Vitals:    02/13/24 1307   BP: 136/84   Site: Left Upper Arm   Position: Sitting   Cuff Size: Medium Adult   Pulse: 75   Resp: 18   SpO2: 96%   Weight: 74.9 kg (165 lb 3.2 oz)   Height: 1.524 m (5')     No active chest pain or cardiac issues noted  No recent hospitalizations/urgent care visits  No refills needed    Records requested from Dr Sheppard (prior cardiologist)

## 2024-02-13 NOTE — PROGRESS NOTES
Jenna Hill MD. [facility]          Patient: Shreya WORTHY Jaime  : 1950      Today's Date: 2024        HISTORY OF PRESENT ILLNESS:     History of Present Illness:  Switching from Dr. Sheppard     Has had atherosclerosis and dyslipidemia.   Denies CP or sig SOB.  Class 1 SEGURA.    She feels fine.         PAST MEDICAL HISTORY:     Past Medical History:   Diagnosis Date    Allergic rhinitis childhood    Aneurysm (HCC)     splenic artery aneurysm - no longer needs to be followed up - will never be a problem    Anxiety childhood    CAD (coronary artery disease) approx age 63    mild    Chronic back pain injury age 59    Chronic pain     back    CKD (chronic kidney disease) stage 3, GFR 30-59 ml/min ([facility])     s/p embolization or left renal artery for bleeding after staghorn calculus surgery 2022    COVID-19     Hypoxic respiratory failure.  Hospitalized 2020-2020.    Depression approx age 19    mild    Gastrointestinal bleeding     GI bleed - stomach - NSAID use    History of vascular access device 2020    4 Fr midilne, 10 cm L basilic, poor access    Hyperlipidemia     Hypertension     Migraine     Obesity childhood    off and on    Osteoarthritis approx age 38    Osteoporosis     By DEXA     Other ill-defined conditions(799.89)     wheezing with allergies    Overactive bladder     PUD (peptic ulcer disease)     Spinal stenosis of lumbar region     Thromboembolus (HCC)     PE's in her 40's    Urinary incontinence age 60    moderate at present time wear pads       Past Surgical History:   Procedure Laterality Date    COLONOSCOPY N/A 10/1/2018    COLONOSCOPY performed by Carlos Frausto MD at [facility] ENDOSCOPY    EYE SURGERY  cataract removal bilat. age 68    HYSTERECTOMY (CERVIX STATUS UNKNOWN)      JOINT REPLACEMENT  spinal fusion after injury    /not effective    ORTHOPEDIC SURGERY Bilateral     carpal tunnel release    ORTHOPEDIC SURGERY Bilateral     bunionectomy - bilateral once and

## 2024-02-13 NOTE — PROGRESS NOTES
Chief Complaint   Patient presents with    New Patient     Vitals:    02/13/24 1307   Height: 1.524 m (5')      Ht 1.524 m (5')   BMI 33.40 kg/m²        Chief Complaint   Patient presents with    New Patient     Vitals:    02/13/24 1307   Height: 1.524 m (5')      Ht 1.524 m (5')   BMI 33.40 kg/m²

## 2024-02-15 ENCOUNTER — TELEPHONE (OUTPATIENT)
Age: 74
End: 2024-02-15

## 2024-02-15 NOTE — TELEPHONE ENCOUNTER
CALLED 02:49 PM and M  Regarding labs    ----- Message from SAHARA Lion - NP sent at 2/15/2024  8:24 AM EST -----  A1c is still with a pre DM. Please watch carbs and sugar intake     WBC elevated. I see this has happen in the past. Was she feeling ok?     All other labs stable

## 2024-02-16 DIAGNOSIS — G47.00 INSOMNIA, UNSPECIFIED TYPE: Primary | ICD-10-CM

## 2024-02-16 RX ORDER — ZOLPIDEM TARTRATE 10 MG/1
10 TABLET ORAL NIGHTLY PRN
Qty: 30 TABLET | Refills: 0 | Status: SHIPPED | OUTPATIENT
Start: 2024-02-16 | End: 2024-03-17

## 2024-02-20 ENCOUNTER — OFFICE VISIT (OUTPATIENT)
Age: 74
End: 2024-02-20
Payer: MEDICARE

## 2024-02-20 VITALS
WEIGHT: 166 LBS | DIASTOLIC BLOOD PRESSURE: 78 MMHG | HEIGHT: 60 IN | RESPIRATION RATE: 17 BRPM | OXYGEN SATURATION: 95 % | TEMPERATURE: 98 F | BODY MASS INDEX: 32.59 KG/M2 | HEART RATE: 71 BPM | SYSTOLIC BLOOD PRESSURE: 133 MMHG

## 2024-02-20 DIAGNOSIS — D72.829 LEUKOCYTOSIS, UNSPECIFIED TYPE: ICD-10-CM

## 2024-02-20 DIAGNOSIS — Z98.1 HX OF SPINAL FUSION: ICD-10-CM

## 2024-02-20 DIAGNOSIS — M62.89 MUSCLE TIGHTNESS: ICD-10-CM

## 2024-02-20 DIAGNOSIS — N18.31 CHRONIC KIDNEY DISEASE, STAGE 3A (HCC): Primary | ICD-10-CM

## 2024-02-20 PROCEDURE — 99213 OFFICE O/P EST LOW 20 MIN: CPT | Performed by: INTERNAL MEDICINE

## 2024-02-20 PROCEDURE — 3017F COLORECTAL CA SCREEN DOC REV: CPT | Performed by: INTERNAL MEDICINE

## 2024-02-20 PROCEDURE — 3075F SYST BP GE 130 - 139MM HG: CPT | Performed by: INTERNAL MEDICINE

## 2024-02-20 PROCEDURE — 1123F ACP DISCUSS/DSCN MKR DOCD: CPT | Performed by: INTERNAL MEDICINE

## 2024-02-20 PROCEDURE — G8427 DOCREV CUR MEDS BY ELIG CLIN: HCPCS | Performed by: INTERNAL MEDICINE

## 2024-02-20 PROCEDURE — 1090F PRES/ABSN URINE INCON ASSESS: CPT | Performed by: INTERNAL MEDICINE

## 2024-02-20 PROCEDURE — 1036F TOBACCO NON-USER: CPT | Performed by: INTERNAL MEDICINE

## 2024-02-20 PROCEDURE — G8417 CALC BMI ABV UP PARAM F/U: HCPCS | Performed by: INTERNAL MEDICINE

## 2024-02-20 PROCEDURE — G8399 PT W/DXA RESULTS DOCUMENT: HCPCS | Performed by: INTERNAL MEDICINE

## 2024-02-20 PROCEDURE — G8484 FLU IMMUNIZE NO ADMIN: HCPCS | Performed by: INTERNAL MEDICINE

## 2024-02-20 PROCEDURE — 3078F DIAST BP <80 MM HG: CPT | Performed by: INTERNAL MEDICINE

## 2024-02-20 RX ORDER — CYCLOBENZAPRINE HCL 10 MG
10 TABLET ORAL 3 TIMES DAILY PRN
Qty: 30 TABLET | Refills: 0 | Status: SHIPPED | OUTPATIENT
Start: 2024-02-20 | End: 2024-03-01

## 2024-02-20 ASSESSMENT — PATIENT HEALTH QUESTIONNAIRE - PHQ9
SUM OF ALL RESPONSES TO PHQ QUESTIONS 1-9: 0
2. FEELING DOWN, DEPRESSED OR HOPELESS: 0
SUM OF ALL RESPONSES TO PHQ9 QUESTIONS 1 & 2: 0
1. LITTLE INTEREST OR PLEASURE IN DOING THINGS: 0

## 2024-02-20 ASSESSMENT — ENCOUNTER SYMPTOMS
RESPIRATORY NEGATIVE: 1
GASTROINTESTINAL NEGATIVE: 1

## 2024-02-20 NOTE — PROGRESS NOTES
Subjective    Shreya Sandoval is a 73 y.o. female who presents today for the following: patient was seen for a follow up.     Reports that she has had some increase stressed due to family concerns. This has increased her anxiety and depression. Was placed on Cymbalta last visit what she says had helped some.     Her labs that she just had done showed that her WBC and neutrophils were elevated. Per her results this has happened a few times in the past. Once time [place] when she was suffering from kidney stones. No fever.     Has ongoing neck pain and tightness. Asking for a refill on her muscle relaxer. History of a spinal fusion    Chief Complaint   Patient presents with    Follow-up Chronic Condition    Hypertension    Osteoporosis           PMH/PSH/Allergies/Social History/medication list and most recent studies reviewed with patient.     reports that she has never smoked. She has never used smokeless tobacco.    reports current alcohol use of about 5.0 standard drinks of alcohol per week.     Vitals:    02/20/24 1047   BP: 133/78   Pulse: 71   Resp: 17   Temp: 98 °F (36.7 °C)   SpO2: 95%     Body mass index is 32.42 kg/m².      2/20/2024    10:47 AM 2/13/2024     1:07 PM 1/11/2024    10:30 AM 5/24/2023    11:08 AM 1/18/2023    10:52 AM 10/18/2022    11:02 AM 7/18/2022     9:21 AM   Weight Metrics   Weight 166 lb 165 lb 3.2 oz 171 lb 172 lb 173 lb 6.4 oz 165 lb 157 lb   BMI (Calculated) 32.5 kg/m2 32.3 kg/m2 33.5 kg/m2 33.7 kg/m2 33.9 kg/m2 32.3 kg/m2 30.7 kg/m2       Past Medical History:   Diagnosis Date    Allergic rhinitis childhood    Aneurysm (HCC)     splenic artery aneurysm - no longer needs to be followed up - will never be a problem    Anxiety childhood    CAD (coronary artery disease)     CAC    Chronic back pain injury age 59    Chronic pain     back    CKD (chronic kidney disease) stage 3, GFR 30-59 ml/min ([facility])     s/p embolization or left renal artery for bleeding after staghorn calculus surgery 2/2022

## 2024-02-20 NOTE — PROGRESS NOTES
Pre-surgery ministry of presence & prayer to demonstrate caring & concern, convey emotional & spiritual support.     rajesh De Paz, MDiv,ThM,PhD \"Have you been to the ER, urgent care clinic since your last visit?  Hospitalized since your last visit?\"    NO    “Have you seen or consulted any other health care providers outside of [facility] since your last visit?”    NO

## 2024-02-29 ENCOUNTER — OFFICE VISIT (OUTPATIENT)
Age: 74
End: 2024-02-29
Payer: MEDICARE

## 2024-02-29 VITALS
WEIGHT: 164 LBS | RESPIRATION RATE: 16 BRPM | HEIGHT: 60 IN | SYSTOLIC BLOOD PRESSURE: 126 MMHG | DIASTOLIC BLOOD PRESSURE: 76 MMHG | BODY MASS INDEX: 32.2 KG/M2 | OXYGEN SATURATION: 98 % | HEART RATE: 73 BPM

## 2024-02-29 DIAGNOSIS — G43.111 INTRACTABLE MIGRAINE WITH AURA WITH STATUS MIGRAINOSUS: Primary | ICD-10-CM

## 2024-02-29 PROCEDURE — 1036F TOBACCO NON-USER: CPT | Performed by: PSYCHIATRY & NEUROLOGY

## 2024-02-29 PROCEDURE — G8399 PT W/DXA RESULTS DOCUMENT: HCPCS | Performed by: PSYCHIATRY & NEUROLOGY

## 2024-02-29 PROCEDURE — 3074F SYST BP LT 130 MM HG: CPT | Performed by: PSYCHIATRY & NEUROLOGY

## 2024-02-29 PROCEDURE — 3017F COLORECTAL CA SCREEN DOC REV: CPT | Performed by: PSYCHIATRY & NEUROLOGY

## 2024-02-29 PROCEDURE — 99204 OFFICE O/P NEW MOD 45 MIN: CPT | Performed by: PSYCHIATRY & NEUROLOGY

## 2024-02-29 PROCEDURE — G8484 FLU IMMUNIZE NO ADMIN: HCPCS | Performed by: PSYCHIATRY & NEUROLOGY

## 2024-02-29 PROCEDURE — 1123F ACP DISCUSS/DSCN MKR DOCD: CPT | Performed by: PSYCHIATRY & NEUROLOGY

## 2024-02-29 PROCEDURE — G8417 CALC BMI ABV UP PARAM F/U: HCPCS | Performed by: PSYCHIATRY & NEUROLOGY

## 2024-02-29 PROCEDURE — G8427 DOCREV CUR MEDS BY ELIG CLIN: HCPCS | Performed by: PSYCHIATRY & NEUROLOGY

## 2024-02-29 PROCEDURE — 3078F DIAST BP <80 MM HG: CPT | Performed by: PSYCHIATRY & NEUROLOGY

## 2024-02-29 PROCEDURE — 1090F PRES/ABSN URINE INCON ASSESS: CPT | Performed by: PSYCHIATRY & NEUROLOGY

## 2024-02-29 RX ORDER — RIZATRIPTAN BENZOATE 10 MG/1
TABLET, ORALLY DISINTEGRATING ORAL
Qty: 9 TABLET | Refills: 5 | Status: SHIPPED | OUTPATIENT
Start: 2024-02-29

## 2024-02-29 RX ORDER — RIMEGEPANT SULFATE 75 MG/75MG
TABLET, ORALLY DISINTEGRATING ORAL
Qty: 16 TABLET | Refills: 5 | Status: SHIPPED | OUTPATIENT
Start: 2024-02-29

## 2024-02-29 ASSESSMENT — PATIENT HEALTH QUESTIONNAIRE - PHQ9
1. LITTLE INTEREST OR PLEASURE IN DOING THINGS: 0
2. FEELING DOWN, DEPRESSED OR HOPELESS: 0
SUM OF ALL RESPONSES TO PHQ QUESTIONS 1-9: 0
SUM OF ALL RESPONSES TO PHQ9 QUESTIONS 1 & 2: 0

## 2024-02-29 NOTE — PROGRESS NOTES
[facility] at [facility] Neurology Clinics at [address] [place] [address] [address] [place] [address] [phone] Office  [phone] Facsimile           Referring:     Chief Complaint   Patient presents with    New Patient     migraines     73-year-old lady presents today for neurologic consultation regarding worsening migraine.  She started having migraine headaches when she was 24.  She was hoping they would get better when she went through the change but that occurred at 51 and she has continued to have them.  She previously followed with Dr. Barr prior to his [facility].  She notes are typically one-sided the other intense pain.  She has an aura of jagged lights preceding most of them.  She will sometimes get the aura without the headache.  She has photophobia phonophobia and nausea.  Triggers include stress, alternating between light and shadow, chocolate and decreased sleep.  She does have insomnia.  In average months she will have 7 headaches or so.  They can last days to weeks.  She uses Maxalt which is typically effective.  In the past she tried Relpax and Imitrex.  For preventatives she was on Topamax up to a dose of 200 mg twice daily and that caused renal calculus and subsequent hemorrhage.  She has also been on propranolol.  In addition she is currently on beta-blocker Lopressor and she is also on Cymbalta.  No focal deficits with migraine.  Her brother and her grandsons have them as well.      Past Medical History:   Diagnosis Date    Allergic rhinitis childhood    Aneurysm ([facility])     splenic artery aneurysm - no longer needs to be followed up - will never be a problem    Anxiety childhood    CAD (coronary artery disease)     CAC    Chronic back pain injury age 59    Chronic pain     back    CKD (chronic kidney disease) stage 3, GFR 30-59 ml/min ([facility])     s/p

## 2024-03-13 ENCOUNTER — TELEPHONE (OUTPATIENT)
Age: 74
End: 2024-03-13

## 2024-03-13 DIAGNOSIS — G47.00 INSOMNIA, UNSPECIFIED TYPE: ICD-10-CM

## 2024-03-13 LAB
BASOPHILS # BLD AUTO: 0.1 X10E3/UL (ref 0–0.2)
BASOPHILS NFR BLD AUTO: 1 %
EOSINOPHIL # BLD AUTO: 0.5 X10E3/UL (ref 0–0.4)
EOSINOPHIL NFR BLD AUTO: 4 %
ERYTHROCYTE [DISTWIDTH] IN BLOOD BY AUTOMATED COUNT: 13 % (ref 11.7–15.4)
HCT VFR BLD AUTO: 42.6 % (ref 34–46.6)
HGB BLD-MCNC: 13.7 G/DL (ref 11.1–15.9)
IMM GRANULOCYTES # BLD AUTO: 0 X10E3/UL (ref 0–0.1)
IMM GRANULOCYTES NFR BLD AUTO: 0 %
LYMPHOCYTES # BLD AUTO: 6.3 X10E3/UL (ref 0.7–3.1)
LYMPHOCYTES NFR BLD AUTO: 51 %
MCH RBC QN AUTO: 29.3 PG (ref 26.6–33)
MCHC RBC AUTO-ENTMCNC: 32.2 G/DL (ref 31.5–35.7)
MCV RBC AUTO: 91 FL (ref 79–97)
MONOCYTES # BLD AUTO: 0.5 X10E3/UL (ref 0.1–0.9)
MONOCYTES NFR BLD AUTO: 4 %
NEUTROPHILS # BLD AUTO: 4.9 X10E3/UL (ref 1.4–7)
NEUTROPHILS NFR BLD AUTO: 40 %
PLATELET # BLD AUTO: 245 X10E3/UL (ref 150–450)
RBC # BLD AUTO: 4.68 X10E6/UL (ref 3.77–5.28)
WBC # BLD AUTO: 12.4 X10E3/UL (ref 3.4–10.8)

## 2024-03-13 NOTE — TELEPHONE ENCOUNTER
Re: [facility] RUTH [place], Key# [id number] per felix: The drug you asked for is not listed in your preferred drug list (formulary). The preferred drug(s), you may not have tried, are: Aimovig Autoinjector subcutaneous auto-injector Emgality Pen subcutaneous pen injector Qulipta tablet Your provider needs to give us medical reasons why the preferred drug(s) would not work for you and/or would have bad side effects. Sometimes a preferred drug needs more review for approval. Additionally, some preferred drugs listed may be the same drugs with different strengths or forms. [employer] may only require one strength or form of that drug to be tried. This decision was from [id number] Non-Formulary Exceptions Coverage Policy

## 2024-03-14 ENCOUNTER — TELEPHONE (OUTPATIENT)
Age: 74
End: 2024-03-14

## 2024-03-14 LAB
ALBUMIN SERPL-MCNC: 4.8 G/DL (ref 3.8–4.8)
ALBUMIN/GLOB SERPL: 2.1 {RATIO} (ref 1.2–2.2)
ALP SERPL-CCNC: 67 IU/L (ref 44–121)
ALT SERPL-CCNC: 16 IU/L (ref 0–32)
APPEARANCE UR: ABNORMAL
AST SERPL-CCNC: 28 IU/L (ref 0–40)
BACTERIA #/AREA URNS HPF: ABNORMAL /[HPF]
BILIRUB SERPL-MCNC: 0.6 MG/DL (ref 0–1.2)
BILIRUB UR QL STRIP: NEGATIVE
BUN SERPL-MCNC: 18 MG/DL (ref 8–27)
BUN/CREAT SERPL: 17 (ref 12–28)
CALCIUM SERPL-MCNC: 10.4 MG/DL (ref 8.7–10.3)
CASTS URNS QL MICRO: ABNORMAL /LPF
CHLORIDE SERPL-SCNC: 98 MMOL/L (ref 96–106)
CO2 SERPL-SCNC: 25 MMOL/L (ref 20–29)
COLOR UR: YELLOW
CREAT SERPL-MCNC: 1.04 MG/DL (ref 0.57–1)
EGFRCR SERPLBLD CKD-EPI 2021: 56 ML/MIN/1.73
EPI CELLS #/AREA URNS HPF: ABNORMAL /HPF (ref 0–10)
GLOBULIN SER CALC-MCNC: 2.3 G/DL (ref 1.5–4.5)
GLUCOSE SERPL-MCNC: 91 MG/DL (ref 70–99)
GLUCOSE UR QL STRIP: NEGATIVE
HGB UR QL STRIP: NEGATIVE
KETONES UR QL STRIP: NEGATIVE
LEUKOCYTE ESTERASE UR QL STRIP: ABNORMAL
MICRO URNS: ABNORMAL
NITRITE UR QL STRIP: NEGATIVE
PH UR STRIP: 6.5 [PH] (ref 5–7.5)
POTASSIUM SERPL-SCNC: 4.7 MMOL/L (ref 3.5–5.2)
PROT SERPL-MCNC: 7.1 G/DL (ref 6–8.5)
PROT UR QL STRIP: NEGATIVE
RBC #/AREA URNS HPF: ABNORMAL /HPF (ref 0–2)
SODIUM SERPL-SCNC: 137 MMOL/L (ref 134–144)
SP GR UR STRIP: 1.01 (ref 1–1.03)
UROBILINOGEN UR STRIP-MCNC: 0.2 MG/DL (ref 0.2–1)
WBC #/AREA URNS HPF: ABNORMAL /HPF (ref 0–5)

## 2024-03-14 RX ORDER — ZOLPIDEM TARTRATE 10 MG/1
10 TABLET ORAL NIGHTLY PRN
Qty: 30 TABLET | Refills: 1 | Status: SHIPPED | OUTPATIENT
Start: 2024-03-14 | End: 2024-05-13

## 2024-03-14 NOTE — TELEPHONE ENCOUNTER
Called 09:05 am and [facility]  Regarding labs    ----- Message from SAHARA Lion NP sent at 3/14/2024  8:13 AM EDT -----  WBC continues to be slightly elevated alongside lymphs. This appears to a trend for the last few years.     Can consider seeing hematology for an opinion

## 2024-03-14 NOTE — TELEPHONE ENCOUNTER
LVM with insurance preferences.  Requested call back or mychart msg with either okay to send alternative or let us know if she has issue with self injectables and we can resubmit for Nurtec.

## 2024-03-15 LAB
CHOLEST SERPL-MCNC: 151 MG/DL (ref 100–199)
HDL SERPL-SCNC: 39.9 UMOL/L
HDLC SERPL-MCNC: 56 MG/DL
LDL SERPL QN: 20.6 NM
LDL SERPL-SCNC: 1354 NMOL/L
LDL SMALL SERPL-SCNC: 960 NMOL/L
LDLC SERPL CALC-MCNC: 74 MG/DL (ref 0–99)
LP-IR SCORE SERPL: 55
TRIGL SERPL-MCNC: 116 MG/DL (ref 0–149)

## 2024-04-02 RX ORDER — ESTRADIOL 0.1 MG/G
2 CREAM VAGINAL DAILY
Qty: 42.5 G | Refills: 1 | Status: SHIPPED | OUTPATIENT
Start: 2024-04-02

## 2024-04-02 RX ORDER — ALENDRONATE SODIUM 70 MG/1
70 TABLET ORAL
Qty: 12 TABLET | Refills: 0 | Status: SHIPPED | OUTPATIENT
Start: 2024-04-02

## 2024-04-02 NOTE — TELEPHONE ENCOUNTER
Last appt 2/20/2024      Next Apt:     None       [facility] [id number] - [place] - [address] - P [phone] - F [phone] [address] [place] [address]  Phone: [phone] Fax: [phone]

## 2024-04-30 DIAGNOSIS — Z98.1 HX OF SPINAL FUSION: ICD-10-CM

## 2024-04-30 DIAGNOSIS — M62.89 MUSCLE TIGHTNESS: ICD-10-CM

## 2024-04-30 RX ORDER — CYCLOBENZAPRINE HCL 10 MG
10 TABLET ORAL 3 TIMES DAILY PRN
Qty: 30 TABLET | Refills: 0 | Status: SHIPPED | OUTPATIENT
Start: 2024-04-30

## 2024-05-08 DIAGNOSIS — F41.9 ANXIETY: ICD-10-CM

## 2024-05-08 RX ORDER — DULOXETIN HYDROCHLORIDE 20 MG/1
20 CAPSULE, DELAYED RELEASE ORAL DAILY
Qty: 60 CAPSULE | Refills: 1 | Status: SHIPPED | OUTPATIENT
Start: 2024-05-08

## 2024-05-13 DIAGNOSIS — G47.00 INSOMNIA, UNSPECIFIED TYPE: ICD-10-CM

## 2024-05-13 RX ORDER — ZOLPIDEM TARTRATE 10 MG/1
TABLET ORAL
Qty: 30 TABLET | Refills: 1 | Status: SHIPPED | OUTPATIENT
Start: 2024-05-13 | End: 2024-07-12

## 2024-06-07 ENCOUNTER — TRANSCRIBE ORDERS (OUTPATIENT)
Facility: HOSPITAL | Age: 74
End: 2024-06-07

## 2024-06-07 DIAGNOSIS — Z12.31 SCREENING MAMMOGRAM FOR HIGH-RISK PATIENT: Primary | ICD-10-CM

## 2024-07-02 ENCOUNTER — OFFICE VISIT (OUTPATIENT)
Age: 74
End: 2024-07-02
Payer: MEDICARE

## 2024-07-02 ENCOUNTER — HOSPITAL ENCOUNTER (OUTPATIENT)
Facility: HOSPITAL | Age: 74
Discharge: HOME | End: 2024-07-05
Payer: MEDICARE

## 2024-07-02 VITALS
SYSTOLIC BLOOD PRESSURE: 116 MMHG | HEIGHT: 60 IN | RESPIRATION RATE: 17 BRPM | DIASTOLIC BLOOD PRESSURE: 62 MMHG | HEART RATE: 79 BPM | OXYGEN SATURATION: 98 % | WEIGHT: 160.1 LBS | BODY MASS INDEX: 31.43 KG/M2 | TEMPERATURE: 98 F

## 2024-07-02 DIAGNOSIS — Z12.31 SCREENING MAMMOGRAM FOR HIGH-RISK PATIENT: ICD-10-CM

## 2024-07-02 DIAGNOSIS — M81.6 LOCALIZED OSTEOPOROSIS WITHOUT CURRENT PATHOLOGICAL FRACTURE: ICD-10-CM

## 2024-07-02 DIAGNOSIS — R73.03 PRE-DIABETES: ICD-10-CM

## 2024-07-02 DIAGNOSIS — D72.829 LEUKOCYTOSIS, UNSPECIFIED TYPE: ICD-10-CM

## 2024-07-02 DIAGNOSIS — E78.2 MIXED HYPERLIPIDEMIA: ICD-10-CM

## 2024-07-02 DIAGNOSIS — G89.4 CHRONIC PAIN SYNDROME: ICD-10-CM

## 2024-07-02 DIAGNOSIS — N18.31 CHRONIC KIDNEY DISEASE, STAGE 3A (HCC): ICD-10-CM

## 2024-07-02 DIAGNOSIS — I10 HYPERTENSION, UNSPECIFIED TYPE: ICD-10-CM

## 2024-07-02 DIAGNOSIS — N18.31 CHRONIC KIDNEY DISEASE, STAGE 3A (HCC): Primary | ICD-10-CM

## 2024-07-02 PROBLEM — J96.01 ACUTE HYPOXEMIC RESPIRATORY FAILURE DUE TO COVID-19 (HCC): Status: RESOLVED | Noted: 2020-12-26 | Resolved: 2024-07-02

## 2024-07-02 PROBLEM — J96.01 ACUTE RESPIRATORY FAILURE WITH HYPOXIA (HCC): Status: RESOLVED | Noted: 2020-12-31 | Resolved: 2024-07-02

## 2024-07-02 PROBLEM — U07.1 ACUTE HYPOXEMIC RESPIRATORY FAILURE DUE TO COVID-19 (HCC): Status: RESOLVED | Noted: 2020-12-26 | Resolved: 2024-07-02

## 2024-07-02 PROCEDURE — 99214 OFFICE O/P EST MOD 30 MIN: CPT | Performed by: INTERNAL MEDICINE

## 2024-07-02 PROCEDURE — 3078F DIAST BP <80 MM HG: CPT | Performed by: INTERNAL MEDICINE

## 2024-07-02 PROCEDURE — 1123F ACP DISCUSS/DSCN MKR DOCD: CPT | Performed by: INTERNAL MEDICINE

## 2024-07-02 PROCEDURE — G8417 CALC BMI ABV UP PARAM F/U: HCPCS | Performed by: INTERNAL MEDICINE

## 2024-07-02 PROCEDURE — 1036F TOBACCO NON-USER: CPT | Performed by: INTERNAL MEDICINE

## 2024-07-02 PROCEDURE — 3074F SYST BP LT 130 MM HG: CPT | Performed by: INTERNAL MEDICINE

## 2024-07-02 PROCEDURE — 1090F PRES/ABSN URINE INCON ASSESS: CPT | Performed by: INTERNAL MEDICINE

## 2024-07-02 PROCEDURE — 77063 BREAST TOMOSYNTHESIS BI: CPT

## 2024-07-02 PROCEDURE — G8427 DOCREV CUR MEDS BY ELIG CLIN: HCPCS | Performed by: INTERNAL MEDICINE

## 2024-07-02 PROCEDURE — G8399 PT W/DXA RESULTS DOCUMENT: HCPCS | Performed by: INTERNAL MEDICINE

## 2024-07-02 RX ORDER — DULOXETIN HYDROCHLORIDE 30 MG/1
30 CAPSULE, DELAYED RELEASE ORAL DAILY
Qty: 90 CAPSULE | Refills: 1 | Status: SHIPPED | OUTPATIENT
Start: 2024-07-02

## 2024-07-02 RX ORDER — SEMAGLUTIDE 0.68 MG/ML
0.25 INJECTION, SOLUTION SUBCUTANEOUS
Qty: 5 ML | Refills: 0 | Status: SHIPPED | OUTPATIENT
Start: 2024-07-02

## 2024-07-02 SDOH — ECONOMIC STABILITY: FOOD INSECURITY: WITHIN THE PAST 12 MONTHS, THE FOOD YOU BOUGHT JUST DIDN'T LAST AND YOU DIDN'T HAVE MONEY TO GET MORE.: NEVER TRUE

## 2024-07-02 SDOH — ECONOMIC STABILITY: INCOME INSECURITY: HOW HARD IS IT FOR YOU TO PAY FOR THE VERY BASICS LIKE FOOD, HOUSING, MEDICAL CARE, AND HEATING?: NOT HARD AT ALL

## 2024-07-02 SDOH — ECONOMIC STABILITY: FOOD INSECURITY: WITHIN THE PAST 12 MONTHS, YOU WORRIED THAT YOUR FOOD WOULD RUN OUT BEFORE YOU GOT MONEY TO BUY MORE.: NEVER TRUE

## 2024-07-02 ASSESSMENT — ENCOUNTER SYMPTOMS
RESPIRATORY NEGATIVE: 1
GASTROINTESTINAL NEGATIVE: 1

## 2024-07-02 NOTE — PROGRESS NOTES
Chief Complaint   Patient presents with    Hypertension    Chronic Kidney Disease            \"Have you been to the ER, urgent care clinic since your last visit?  Hospitalized since your last visit?\"    NO    “Have you seen or consulted any other health care providers outside of [facility] since your last visit?”    NO            Click Here for Release of Records Request

## 2024-07-02 NOTE — PROGRESS NOTES
Subjective    Shreya Sandoval is a 74 y.o. female who presents today for the following:  Chief Complaint   Patient presents with    Hypertension    Chronic Kidney Disease           Discuss Labs       History of Present Illness  The patient presents for a follow-up.    The patient requires a renewal of her Ambien prescription, which is due for the following week.    The patient expresses a desire to undergo laboratory tests due to a decrease in the efficacy of her kidneys, diagnosed as stage 3. She attributes this decrease to her recent illness. She plans to consult with her surgeon post-surgery.    The patient requests a cholesterol check due to her need to schedule an appointment with her cardiologist. She reports no current complaints.    The patient has a longstanding history of elevated white blood count.    The patient reports slight improvement in her back pain with the use of Cymbalta 10 mg.    Also asking about Ozempic for for her weight, HLD, HTN and pre diabetes diagnosis.     PMH/PSH/Allergies/Social History/medication list and most recent studies reviewed with patient.     reports that she has never smoked. She has never used smokeless tobacco.    reports current alcohol use.   Results  Laboratory Studies  Creatinine has been elevated. White blood count was 12.4 in March. Calcium was slightly elevated at 10.4 and 10.5.    Imaging  Bone scan in November 2021 showed osteoporosis.     Vitals:    07/02/24 1305   BP: 116/62   Pulse: 79   Resp: 17   Temp: 98 °F (36.7 °C)   SpO2: 98%     Body mass index is 31.27 kg/m².      7/2/2024     1:05 PM 2/29/2024    12:07 PM 2/20/2024    10:47 AM 2/13/2024     1:07 PM 1/11/2024    10:30 AM 5/24/2023    11:08 AM 1/18/2023    10:52 AM   Weight Metrics   Weight 160 lb 1.6 oz 164 lb 166 lb 165 lb 3.2 oz 171 lb 172 lb 173 lb 6.4 oz   BMI (Calculated) 31.3 kg/m2 32.1 kg/m2 32.5 kg/m2 32.3 kg/m2 33.5 kg/m2 33.7 kg/m2 33.9 kg/m2       Past Medical History:   Diagnosis Date

## 2024-07-04 DIAGNOSIS — M81.6 LOCALIZED OSTEOPOROSIS WITHOUT CURRENT PATHOLOGICAL FRACTURE: Primary | ICD-10-CM

## 2024-07-04 RX ORDER — ALENDRONATE SODIUM 70 MG/1
TABLET ORAL
Qty: 12 TABLET | Refills: 0 | Status: SHIPPED | OUTPATIENT
Start: 2024-07-04

## 2024-07-08 DIAGNOSIS — G47.00 INSOMNIA, UNSPECIFIED TYPE: ICD-10-CM

## 2024-07-08 LAB
BASOPHILS # BLD AUTO: 0.1 X10E3/UL (ref 0–0.2)
BASOPHILS NFR BLD AUTO: 1 %
EOSINOPHIL # BLD AUTO: 0.4 X10E3/UL (ref 0–0.4)
EOSINOPHIL NFR BLD AUTO: 3 %
ERYTHROCYTE [DISTWIDTH] IN BLOOD BY AUTOMATED COUNT: 12.8 % (ref 11.7–15.4)
HCT VFR BLD AUTO: 41.8 % (ref 34–46.6)
HGB BLD-MCNC: 13.5 G/DL (ref 11.1–15.9)
IMM GRANULOCYTES # BLD AUTO: 0 X10E3/UL (ref 0–0.1)
IMM GRANULOCYTES NFR BLD AUTO: 0 %
LYMPHOCYTES # BLD AUTO: 6.5 X10E3/UL (ref 0.7–3.1)
LYMPHOCYTES NFR BLD AUTO: 48 %
MCH RBC QN AUTO: 29.5 PG (ref 26.6–33)
MCHC RBC AUTO-ENTMCNC: 32.3 G/DL (ref 31.5–35.7)
MCV RBC AUTO: 91 FL (ref 79–97)
MONOCYTES # BLD AUTO: 0.7 X10E3/UL (ref 0.1–0.9)
MONOCYTES NFR BLD AUTO: 5 %
NEUTROPHILS # BLD AUTO: 5.9 X10E3/UL (ref 1.4–7)
NEUTROPHILS NFR BLD AUTO: 43 %
PLATELET # BLD AUTO: 294 X10E3/UL (ref 150–450)
RBC # BLD AUTO: 4.58 X10E6/UL (ref 3.77–5.28)
WBC # BLD AUTO: 13.7 X10E3/UL (ref 3.4–10.8)

## 2024-07-08 RX ORDER — ZOLPIDEM TARTRATE 10 MG/1
TABLET ORAL
Qty: 30 TABLET | Refills: 0 | Status: SHIPPED | OUTPATIENT
Start: 2024-07-08 | End: 2024-08-07

## 2024-07-09 LAB
ALBUMIN SERPL-MCNC: 4.5 G/DL (ref 3.8–4.8)
ALP SERPL-CCNC: 87 IU/L (ref 44–121)
ALT SERPL-CCNC: 20 IU/L (ref 0–32)
AST SERPL-CCNC: 31 IU/L (ref 0–40)
BILIRUB SERPL-MCNC: 0.4 MG/DL (ref 0–1.2)
BUN SERPL-MCNC: 16 MG/DL (ref 8–27)
BUN/CREAT SERPL: 19 (ref 12–28)
CALCIUM SERPL-MCNC: 10 MG/DL (ref 8.7–10.3)
CHLORIDE SERPL-SCNC: 95 MMOL/L (ref 96–106)
CHOLEST SERPL-MCNC: 146 MG/DL (ref 100–199)
CO2 SERPL-SCNC: 26 MMOL/L (ref 20–29)
CREAT SERPL-MCNC: 0.84 MG/DL (ref 0.57–1)
EGFRCR SERPLBLD CKD-EPI 2021: 72 ML/MIN/1.73
GLOBULIN SER CALC-MCNC: 2.5 G/DL (ref 1.5–4.5)
GLUCOSE SERPL-MCNC: 86 MG/DL (ref 70–99)
HDLC SERPL-MCNC: 66 MG/DL
LDLC SERPL CALC-MCNC: 53 MG/DL (ref 0–99)
POTASSIUM SERPL-SCNC: 4.8 MMOL/L (ref 3.5–5.2)
PROT SERPL-MCNC: 7 G/DL (ref 6–8.5)
SODIUM SERPL-SCNC: 134 MMOL/L (ref 134–144)
TRIGL SERPL-MCNC: 161 MG/DL (ref 0–149)
VLDLC SERPL CALC-MCNC: 27 MG/DL (ref 5–40)

## 2024-07-10 ENCOUNTER — TELEPHONE (OUTPATIENT)
Age: 74
End: 2024-07-10

## 2024-07-10 DIAGNOSIS — I10 HYPERTENSION, UNSPECIFIED TYPE: ICD-10-CM

## 2024-07-10 NOTE — TELEPHONE ENCOUNTER
Called 11:54 AM  Regarding lab results  Couldn't leave voice message      ----- Message from SAHARA Lion NP sent at 7/10/2024  8:18 AM EDT -----  Triglycerides are elevated. Please be sure her diet is lean and vegetable like. She should still be on Zetia and Repatha     WBC remains elevated and has been for years. Has she ever seen hematology?

## 2024-07-11 ENCOUNTER — TELEPHONE (OUTPATIENT)
Age: 74
End: 2024-07-11

## 2024-07-11 DIAGNOSIS — N39.0 URINARY TRACT INFECTION WITHOUT HEMATURIA, SITE UNSPECIFIED: Primary | ICD-10-CM

## 2024-07-11 RX ORDER — SULFAMETHOXAZOLE AND TRIMETHOPRIM 800; 160 MG/1; MG/1
1 TABLET ORAL 2 TIMES DAILY
Qty: 14 TABLET | Refills: 0 | Status: SHIPPED | OUTPATIENT
Start: 2024-07-11 | End: 2024-07-18

## 2024-07-11 NOTE — TELEPHONE ENCOUNTER
Called 09:07 AM  Spoke with patient after verifying name and . Notified patient of lab results and recommendation from provider. Patient verbalized understanding and given a chance to ask questions.        ----- Message from SAHARA Lion NP sent at 7/10/2024  8:54 PM EDT -----  Call patient. She has a UTI. Send in bactrim  800/160 BID for 5 days

## 2024-07-14 LAB
APPEARANCE UR: CLEAR
BACTERIA #/AREA URNS HPF: NORMAL /[HPF]
BACTERIA UR CULT: ABNORMAL
BACTERIA UR CULT: ABNORMAL
BILIRUB UR QL STRIP: NEGATIVE
CASTS URNS QL MICRO: NORMAL /LPF
COLOR UR: YELLOW
EPI CELLS #/AREA URNS HPF: NORMAL /HPF (ref 0–10)
GLUCOSE UR QL STRIP: NEGATIVE
HGB UR QL STRIP: NEGATIVE
IMP & REVIEW OF LAB RESULTS: NORMAL
KETONES UR QL STRIP: NEGATIVE
LEUKOCYTE ESTERASE UR QL STRIP: ABNORMAL
MICRO URNS: ABNORMAL
NITRITE UR QL STRIP: NEGATIVE
PH UR STRIP: 6.5 [PH] (ref 5–7.5)
PROT UR QL STRIP: NEGATIVE
RBC #/AREA URNS HPF: NORMAL /HPF (ref 0–2)
SP GR UR STRIP: 1.01 (ref 1–1.03)
URINALYSIS REFLEX: ABNORMAL
UROBILINOGEN UR STRIP-MCNC: 0.2 MG/DL (ref 0.2–1)
WBC #/AREA URNS HPF: NORMAL /HPF (ref 0–5)

## 2024-07-27 ENCOUNTER — PATIENT MESSAGE (OUTPATIENT)
Age: 74
End: 2024-07-27

## 2024-07-27 DIAGNOSIS — N39.0 URINARY TRACT INFECTION WITHOUT HEMATURIA, SITE UNSPECIFIED: Primary | ICD-10-CM

## 2024-07-29 NOTE — TELEPHONE ENCOUNTER
Department of Anesthesiology  Postprocedure Note    Patient: Zina Colbert  MRN: [id number]  YOB: 1955  Date of evaluation: 4/26/2019  Time:  5:32 PM     Procedure Summary     Date:  04/26/19 Room / Location:  [facility] OR [address] [place] [address] OR    Anesthesia Start:  1873 Anesthesia Stop:  6522    Procedures:       OPEN RECTOPEXY, LYSIS OF ADHESIONS, INTRAOPERATIVE FLEXIBLE SIGMOIDOSCOPY (N/A )      CYSTOSCOPY, BILATERAL URETERAL STENT INSERTION (Bilateral ) Diagnosis:  (RECTAL PROLAPSE)    Surgeon:  Sri Walls MD; Anthony Le MD Responsible Provider:  Carolyn Calvert MD    Anesthesia Type:  general ASA Status:  3          Anesthesia Type: general    Gelnis Phase I: Glenis Score: 8    Glenis Phase II:      Last vitals: Reviewed and per EMR flowsheets.        Anesthesia Post Evaluation    Patient location during evaluation: PACU  Patient participation: complete - patient participated  Level of consciousness: awake and alert  Airway patency: patent  Nausea & Vomiting: no nausea and no vomiting  Cardiovascular status: blood pressure returned to baseline  Respiratory status: acceptable  Hydration status: euvolemic From: Shreya Sandoval  To: Parisa CAMI Lucero  Sent: 7/27/2024 7:02 AM EDT  Subject: Possible Infection    Hi Ms. Lucero. Hope you are well . I think I might need a repeat Urinalysis and C/S as   I started having mild aching in lower abdomen. and some frequency soon after completing antibiotic regime. I have labs done at [facility] in [facility]. Thanks, Shreya Sandoval

## 2024-07-31 ENCOUNTER — HOSPITAL ENCOUNTER (OUTPATIENT)
Facility: HOSPITAL | Age: 74
Discharge: HOME | End: 2024-08-03
Payer: MEDICARE

## 2024-07-31 VITALS — BODY MASS INDEX: 31.8 KG/M2 | WEIGHT: 162 LBS | HEIGHT: 60 IN

## 2024-07-31 DIAGNOSIS — M81.6 LOCALIZED OSTEOPOROSIS WITHOUT CURRENT PATHOLOGICAL FRACTURE: ICD-10-CM

## 2024-07-31 PROCEDURE — 77080 DXA BONE DENSITY AXIAL: CPT

## 2024-08-02 ENCOUNTER — TELEPHONE (OUTPATIENT)
Age: 74
End: 2024-08-02

## 2024-08-02 NOTE — TELEPHONE ENCOUNTER
Pt requesting lab order be sent to [email or web address] on [facility].   Fax number provided: [phone]    Forms faxed multiple times per request.

## 2024-08-05 LAB
APPEARANCE UR: CLEAR
BACTERIA #/AREA URNS HPF: NORMAL /[HPF]
BILIRUB UR QL STRIP: NEGATIVE
CASTS URNS QL MICRO: NORMAL /LPF
COLOR UR: YELLOW
EPI CELLS #/AREA URNS HPF: NORMAL /HPF (ref 0–10)
GLUCOSE UR QL STRIP: NEGATIVE
HGB UR QL STRIP: NEGATIVE
KETONES UR QL STRIP: NEGATIVE
LEUKOCYTE ESTERASE UR QL STRIP: NEGATIVE
MICRO URNS: NORMAL
MICRO URNS: NORMAL
NITRITE UR QL STRIP: NEGATIVE
PH UR STRIP: 7 [PH] (ref 5–7.5)
PROT UR QL STRIP: NEGATIVE
RBC #/AREA URNS HPF: NORMAL /HPF (ref 0–2)
SP GR UR STRIP: 1.01 (ref 1–1.03)
URINALYSIS REFLEX: NORMAL
UROBILINOGEN UR STRIP-MCNC: 0.2 MG/DL (ref 0.2–1)
WBC #/AREA URNS HPF: NORMAL /HPF (ref 0–5)

## 2024-08-08 DIAGNOSIS — G47.00 INSOMNIA, UNSPECIFIED TYPE: ICD-10-CM

## 2024-08-08 RX ORDER — ZOLPIDEM TARTRATE 10 MG/1
TABLET ORAL
Qty: 30 TABLET | Refills: 0 | Status: SHIPPED | OUTPATIENT
Start: 2024-08-08 | End: 2024-09-07

## 2024-09-09 DIAGNOSIS — G47.00 INSOMNIA, UNSPECIFIED TYPE: ICD-10-CM

## 2024-09-09 RX ORDER — ZOLPIDEM TARTRATE 10 MG/1
TABLET ORAL
Qty: 30 TABLET | Refills: 0 | Status: SHIPPED | OUTPATIENT
Start: 2024-09-09 | End: 2024-10-09

## 2024-09-27 DIAGNOSIS — M81.6 LOCALIZED OSTEOPOROSIS WITHOUT CURRENT PATHOLOGICAL FRACTURE: ICD-10-CM

## 2024-09-27 RX ORDER — ALENDRONATE SODIUM 70 MG/1
TABLET ORAL
Qty: 12 TABLET | Refills: 0 | Status: SHIPPED | OUTPATIENT
Start: 2024-09-27

## 2024-10-03 ENCOUNTER — TELEPHONE (OUTPATIENT)
Age: 74
End: 2024-10-03

## 2024-10-03 DIAGNOSIS — I10 HYPERTENSION, UNSPECIFIED TYPE: ICD-10-CM

## 2024-10-03 RX ORDER — METOPROLOL TARTRATE 25 MG/1
25 TABLET, FILM COATED ORAL 2 TIMES DAILY
Qty: 180 TABLET | Refills: 0 | Status: SHIPPED | OUTPATIENT
Start: 2024-10-03

## 2024-10-03 NOTE — TELEPHONE ENCOUNTER
Attempted to contact patient to schedule appointment, no answer, left a vm to call back to schedule a follow up appointment.

## 2024-10-17 DIAGNOSIS — G47.00 INSOMNIA, UNSPECIFIED TYPE: ICD-10-CM

## 2024-10-17 DIAGNOSIS — I10 HYPERTENSION, UNSPECIFIED TYPE: ICD-10-CM

## 2024-10-17 RX ORDER — ZOLPIDEM TARTRATE 10 MG/1
TABLET ORAL
Qty: 30 TABLET | Refills: 0 | Status: SHIPPED | OUTPATIENT
Start: 2024-10-17 | End: 2024-11-16

## 2024-10-17 RX ORDER — METOPROLOL TARTRATE 25 MG/1
25 TABLET, FILM COATED ORAL 2 TIMES DAILY
Qty: 180 TABLET | Refills: 0 | Status: SHIPPED | OUTPATIENT
Start: 2024-10-17

## 2024-10-17 NOTE — TELEPHONE ENCOUNTER
Last appt 7/2/2024      Next Apt:     Future Appointments   Date Time Provider Department Center   10/25/2024  1:40 PM Nolvia Nascimento, APRN - [facility]   12/12/2024 10:45 AM Sonya Avalos MD NEUR[facility]TCR[facility] [id number] - [place] - [address] - P [phone] - F [phone] [address] [facility] [address]  Phone: [phone] Fax: [phone]

## 2024-10-25 ENCOUNTER — OFFICE VISIT (OUTPATIENT)
Age: 74
End: 2024-10-25
Payer: MEDICARE

## 2024-10-25 VITALS
RESPIRATION RATE: 18 BRPM | BODY MASS INDEX: 32.28 KG/M2 | DIASTOLIC BLOOD PRESSURE: 70 MMHG | SYSTOLIC BLOOD PRESSURE: 126 MMHG | HEIGHT: 60 IN | WEIGHT: 164.4 LBS | HEART RATE: 66 BPM | OXYGEN SATURATION: 96 %

## 2024-10-25 DIAGNOSIS — I70.90 ATHEROSCLEROSIS: ICD-10-CM

## 2024-10-25 DIAGNOSIS — E78.5 DYSLIPIDEMIA: ICD-10-CM

## 2024-10-25 DIAGNOSIS — I10 PRIMARY HYPERTENSION: Primary | ICD-10-CM

## 2024-10-25 PROCEDURE — 3078F DIAST BP <80 MM HG: CPT

## 2024-10-25 PROCEDURE — G8417 CALC BMI ABV UP PARAM F/U: HCPCS

## 2024-10-25 PROCEDURE — 99214 OFFICE O/P EST MOD 30 MIN: CPT

## 2024-10-25 PROCEDURE — G8427 DOCREV CUR MEDS BY ELIG CLIN: HCPCS

## 2024-10-25 PROCEDURE — 1159F MED LIST DOCD IN RCRD: CPT

## 2024-10-25 PROCEDURE — G8484 FLU IMMUNIZE NO ADMIN: HCPCS

## 2024-10-25 PROCEDURE — 3074F SYST BP LT 130 MM HG: CPT

## 2024-10-25 PROCEDURE — 1123F ACP DISCUSS/DSCN MKR DOCD: CPT

## 2024-10-25 PROCEDURE — 1126F AMNT PAIN NOTED NONE PRSNT: CPT

## 2024-10-25 PROCEDURE — 1090F PRES/ABSN URINE INCON ASSESS: CPT

## 2024-10-25 PROCEDURE — G8399 PT W/DXA RESULTS DOCUMENT: HCPCS

## 2024-10-25 PROCEDURE — 1036F TOBACCO NON-USER: CPT

## 2024-10-25 RX ORDER — VIT C/B6/B5/MAGNESIUM/HERB 173 50-5-6-5MG
500 CAPSULE ORAL DAILY
COMMUNITY

## 2024-10-25 RX ORDER — EVOLOCUMAB 140 MG/ML
140 INJECTION, SOLUTION SUBCUTANEOUS
Qty: 6 ADJUSTABLE DOSE PRE-FILLED PEN SYRINGE | Refills: 3 | Status: SHIPPED | OUTPATIENT
Start: 2024-10-25

## 2024-10-25 NOTE — PROGRESS NOTES
had concerns including Coronary Artery Disease, Hypertension, and Cholesterol Problem.    Vitals:    10/25/24 1322   BP: 126/70   Site: Left Upper Arm   Position: Sitting   Pulse: 66   Resp: 18   SpO2: 96%   Weight: 74.6 kg (164 lb 6.4 oz)   Height: 1.524 m (5')        Chest pain No    Refills No        1. Have you been to the ER, urgent care clinic since your last visit? No       Hospitalized since your last visit? No       Where?        Date?  
after injury    2010/not effective    ORTHOPEDIC SURGERY Bilateral     carpal tunnel release    ORTHOPEDIC SURGERY Bilateral     bunionectomy - bilateral once and unilateral once    ORTHOPEDIC SURGERY  2009    spinal fusion/has not had a lumbar laminectomy    PARTIAL HYSTERECTOMY (CERVIX NOT REMOVED)  age39    TONSILLECTOMY      T & A    UPPER GASTROINTESTINAL ENDOSCOPY  approx. age 56    UROLOGICAL SURGERY  02/07/2022 2/7/2022 removal staghorn calculus. Subsequent hemorrhage requiring transfusion and embolization of left renal arteries.    UROLOGICAL SURGERY  2012    bladder repair             CURRENT MEDICATIONS:    .  Current Outpatient Medications   Medication Sig Dispense Refill    turmeric 500 MG CAPS Take 1 capsule by mouth daily      Evolocumab (REPATHA SURECLICK) 140 MG/ML SOAJ Inject 140 mg into the skin every 14 days 6 Adjustable Dose Pre-filled Pen Syringe 3    metoprolol tartrate (LOPRESSOR) 25 MG tablet TAKE 1 TABLET BY MOUTH 2 TIMES A  tablet 0    zolpidem (AMBIEN) 10 MG tablet TAKE 1 TABLET BY MOUTH EVERY NIGHT AT BEDTIME AS NEEDED FOR SLEEP 30 tablet 0    alendronate (FOSAMAX) 70 MG tablet TAKE 1 TABLET BY MOUTH ONCE WEEKLY ON AN EMPTY STOMACH BEFORE BREAKFAST. REMAIN UPRIGHT FOR 30 MINUTES AND TAKE WITH 8 OUNCES OF WATER 12 tablet 0    DULoxetine (CYMBALTA) 30 MG extended release capsule Take 1 capsule by mouth daily 90 capsule 1    cyclobenzaprine (FLEXERIL) 10 MG tablet TAKE ONE TABLET BY MOUTH THREE TIMES A DAY AS NEEDED FOR MUSCLE SPASMS 30 tablet 0    estradiol (ESTRACE VAGINAL) 0.1 MG/GM vaginal cream Place 2 g vaginally daily 42.5 g 1    rizatriptan (MAXALT-MLT) 10 MG disintegrating tablet 1 po at migraine onset and repeat in 2 hours x 1 prn 9 tablet 5    ezetimibe (ZETIA) 10 MG tablet Take 1 tablet by mouth daily 90 tablet 3    coenzyme Q10 100 MG CAPS capsule Take 2 capsules by mouth      Bacillus Coagulans-Inulin (PROBIOTIC) 1-250 BILLION-MG CAPS Probiotic      KRILL OIL PO Take

## 2024-11-20 DIAGNOSIS — G47.00 INSOMNIA, UNSPECIFIED TYPE: ICD-10-CM

## 2024-11-20 RX ORDER — ZOLPIDEM TARTRATE 10 MG/1
TABLET ORAL
Qty: 30 TABLET | Refills: 0 | Status: SHIPPED | OUTPATIENT
Start: 2024-11-20 | End: 2024-12-20

## 2024-12-04 ENCOUNTER — TELEMEDICINE (OUTPATIENT)
Age: 74
End: 2024-12-04

## 2024-12-04 ENCOUNTER — TELEPHONE (OUTPATIENT)
Age: 74
End: 2024-12-04

## 2024-12-04 DIAGNOSIS — G89.4 CHRONIC PAIN SYNDROME: ICD-10-CM

## 2024-12-04 DIAGNOSIS — Z87.440 HISTORY OF UTI: ICD-10-CM

## 2024-12-04 DIAGNOSIS — I10 HYPERTENSION, UNSPECIFIED TYPE: ICD-10-CM

## 2024-12-04 DIAGNOSIS — F41.9 ANXIETY: ICD-10-CM
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
## 2024-12-04 DIAGNOSIS — Z09 HOSPITAL DISCHARGE FOLLOW-UP: Primary | ICD-10-CM

## 2024-12-04 DIAGNOSIS — G47.00 INSOMNIA, UNSPECIFIED TYPE: ICD-10-CM

## 2024-12-04 RX ORDER — DULOXETIN HYDROCHLORIDE 20 MG/1
20 CAPSULE, DELAYED RELEASE ORAL 2 TIMES DAILY
Qty: 120 CAPSULE | Refills: 1 | Status: SHIPPED | OUTPATIENT
Start: 2024-12-04

## 2024-12-04 RX ORDER — ZOLPIDEM TARTRATE 5 MG/1
5 TABLET ORAL NIGHTLY PRN
Qty: 30 TABLET | Refills: 0 | Status: SHIPPED | OUTPATIENT
Start: 2024-12-04 | End: 2025-01-03

## 2024-12-04 ASSESSMENT — ENCOUNTER SYMPTOMS
RESPIRATORY NEGATIVE: 1
BACK PAIN: 1

## 2024-12-04 NOTE — PROGRESS NOTES
Chief Complaint   Patient presents with    Follow-Up from Hospital     \"Have you been to the ER, urgent care clinic since your last visit?  Hospitalized since your last visit?\"    yes    “Have you seen or consulted any other health care providers outside our system since your last visit?”    NO             
  Genitourinary:  Negative for flank pain, frequency and urgency.   Musculoskeletal:  Positive for back pain.   Psychiatric/Behavioral:  Positive for sleep disturbance. The patient is nervous/anxious.        Prior to Visit Medications    Medication Sig Taking? Authorizing Provider   zolpidem (AMBIEN) 5 MG tablet Take 1 tablet by mouth nightly as needed for Sleep for up to 30 days. Take 5 mg for 2 weeks, 2.5 for 2 weeks and 2.5 mg every other day till off Max Daily Amount: 5 mg Yes Parisa Lucero APRN - NP   DULoxetine (CYMBALTA) 20 MG extended release capsule Take 1 capsule by mouth 2 times daily Yes Parisa Lucero APRN - NP   turmeric 500 MG CAPS Take 1 capsule by mouth daily Yes Abdullahi Elizabeth MD   Evolocumab (REPATHA SURECLICK) 140 MG/ML SOAJ Inject 140 mg into the skin every 14 days Yes Nolvia Nascimento APRN - NP   metoprolol tartrate (LOPRESSOR) 25 MG tablet TAKE 1 TABLET BY MOUTH 2 TIMES A DAY Yes Parisa Lucero APRN - NP   alendronate (FOSAMAX) 70 MG tablet TAKE 1 TABLET BY MOUTH ONCE WEEKLY ON AN EMPTY STOMACH BEFORE BREAKFAST. REMAIN UPRIGHT FOR 30 MINUTES AND TAKE WITH 8 OUNCES OF WATER Yes Parisa Lucero APRN - NP   cyclobenzaprine (FLEXERIL) 10 MG tablet TAKE ONE TABLET BY MOUTH THREE TIMES A DAY AS NEEDED FOR MUSCLE SPASMS Yes Parisa Lucero APRN - NP   estradiol (ESTRACE VAGINAL) 0.1 MG/GM vaginal cream Place 2 g vaginally daily Yes Parisa Lucero APRN - NP   rizatriptan (MAXALT-MLT) 10 MG disintegrating tablet 1 po at migraine onset and repeat in 2 hours x 1 prn Yes Sonya Avalos MD   ezetimibe (ZETIA) 10 MG tablet Take 1 tablet by mouth daily Yes Jenna Hill MD   coenzyme Q10 100 MG CAPS capsule Take 2 capsules by mouth Yes Abdullahi Elizabeth MD   Bacillus Coagulans-Inulin (PROBIOTIC) 1-250 BILLION-MG CAPS Probiotic Yes Abdullahi Elizabeth MD   KRILL OIL PO Take by mouth daily Yes Automatic Reconciliation, Ar   cetirizine (ZYRTEC) 10 MG tablet Take by mouth

## 2024-12-05 NOTE — TELEPHONE ENCOUNTER
Called to relay the following message:  Spoke with Dr. Palafox and Dr. Spaulding patient can come off fosomax as she has not been taking as she should.  Is not to get dental work for at least 3-6 months post stopping the medicine. Patient is encouraged to take vitamin d, calcium supplement daily and continue with weight bearing activity.   Patient has been on the medication longer than 5 years. Is to get a DEXA scan in 6 months. Make of note patient did have a fracture recently but she has not been compliant  on medication.  She had hip fracture in September due to fall.   She verbalized understanding and appreciation.

## 2024-12-12 ENCOUNTER — OFFICE VISIT (OUTPATIENT)
Age: 74
End: 2024-12-12
Payer: MEDICARE

## 2024-12-12 VITALS
DIASTOLIC BLOOD PRESSURE: 65 MMHG | TEMPERATURE: 97.7 F | SYSTOLIC BLOOD PRESSURE: 132 MMHG | RESPIRATION RATE: 16 BRPM | OXYGEN SATURATION: 96 % | HEART RATE: 85 BPM

## 2024-12-12 DIAGNOSIS — G43.111 INTRACTABLE MIGRAINE WITH AURA WITH STATUS MIGRAINOSUS: Primary | ICD-10-CM

## 2024-12-12 PROCEDURE — 99214 OFFICE O/P EST MOD 30 MIN: CPT | Performed by: PSYCHIATRY & NEUROLOGY

## 2024-12-12 PROCEDURE — G8399 PT W/DXA RESULTS DOCUMENT: HCPCS | Performed by: PSYCHIATRY & NEUROLOGY

## 2024-12-12 PROCEDURE — 1159F MED LIST DOCD IN RCRD: CPT | Performed by: PSYCHIATRY & NEUROLOGY

## 2024-12-12 PROCEDURE — 1090F PRES/ABSN URINE INCON ASSESS: CPT | Performed by: PSYCHIATRY & NEUROLOGY

## 2024-12-12 PROCEDURE — 1123F ACP DISCUSS/DSCN MKR DOCD: CPT | Performed by: PSYCHIATRY & NEUROLOGY

## 2024-12-12 PROCEDURE — 3075F SYST BP GE 130 - 139MM HG: CPT | Performed by: PSYCHIATRY & NEUROLOGY

## 2024-12-12 PROCEDURE — G8417 CALC BMI ABV UP PARAM F/U: HCPCS | Performed by: PSYCHIATRY & NEUROLOGY

## 2024-12-12 PROCEDURE — 1036F TOBACCO NON-USER: CPT | Performed by: PSYCHIATRY & NEUROLOGY

## 2024-12-12 PROCEDURE — G8484 FLU IMMUNIZE NO ADMIN: HCPCS | Performed by: PSYCHIATRY & NEUROLOGY

## 2024-12-12 PROCEDURE — G8427 DOCREV CUR MEDS BY ELIG CLIN: HCPCS | Performed by: PSYCHIATRY & NEUROLOGY

## 2024-12-12 PROCEDURE — 3078F DIAST BP <80 MM HG: CPT | Performed by: PSYCHIATRY & NEUROLOGY

## 2024-12-12 PROCEDURE — 1126F AMNT PAIN NOTED NONE PRSNT: CPT | Performed by: PSYCHIATRY & NEUROLOGY

## 2024-12-12 RX ORDER — SUMATRIPTAN SUCCINATE 100 MG/1
TABLET ORAL
Qty: 9 TABLET | Refills: 11 | Status: SHIPPED | OUTPATIENT
Start: 2024-12-12

## 2024-12-12 RX ORDER — ATOGEPANT 60 MG/1
TABLET ORAL
Qty: 30 TABLET | Refills: 11 | Status: SHIPPED | OUTPATIENT
Start: 2024-12-12

## 2024-12-12 NOTE — PROGRESS NOTES
[facility] at [facility] at [address] [place] [address] [address] [facility] [address] [place] [address] [phone]              Chief Complaint   Patient presents with    Migraine     Was doing better however, was taken off of sleep aid and now not able to sleep through night.  MHA started to increase.  4-5 in last 2 wks.     Current Outpatient Medications   Medication Sig Dispense Refill    DULoxetine (CYMBALTA) 20 MG extended release capsule Take 1 capsule by mouth 2 times daily 120 capsule 1    turmeric 500 MG CAPS Take 1 capsule by mouth daily      Evolocumab (REPATHA SURECLICK) 140 MG/ML SOAJ Inject 140 mg into the skin every 14 days 6 Adjustable Dose Pre-filled Pen Syringe 3    metoprolol tartrate (LOPRESSOR) 25 MG tablet TAKE 1 TABLET BY MOUTH 2 TIMES A  tablet 0    cyclobenzaprine (FLEXERIL) 10 MG tablet TAKE ONE TABLET BY MOUTH THREE TIMES A DAY AS NEEDED FOR MUSCLE SPASMS 30 tablet 0    estradiol (ESTRACE VAGINAL) 0.1 MG/GM vaginal cream Place 2 g vaginally daily 42.5 g 1    rizatriptan (MAXALT-MLT) 10 MG disintegrating tablet 1 po at migraine onset and repeat in 2 hours x 1 prn 9 tablet 5    ezetimibe (ZETIA) 10 MG tablet Take 1 tablet by mouth daily 90 tablet 3    coenzyme Q10 100 MG CAPS capsule Take 2 capsules by mouth      Bacillus Coagulans-Inulin (PROBIOTIC) 1-250 BILLION-MG CAPS Probiotic      KRILL OIL PO Take by mouth daily      cetirizine (ZYRTEC) 10 MG tablet Take by mouth daily      vitamin D (CHOLECALCIFEROL) 125 MCG (5000 UT) CAPS capsule Take 1 capsule by mouth daily      alendronate (FOSAMAX) 70 MG tablet TAKE 1 TABLET BY MOUTH ONCE WEEKLY ON AN EMPTY STOMACH BEFORE BREAKFAST. REMAIN UPRIGHT FOR 30 MINUTES AND TAKE WITH 8 OUNCES OF WATER (Patient not taking: Reported on 12/12/2024) 12 tablet 0     No current facility-administered medications for this

## 2024-12-15 LAB
APPEARANCE UR: CLEAR
BACTERIA #/AREA URNS HPF: NORMAL /[HPF]
BILIRUB UR QL STRIP: NEGATIVE
CASTS URNS QL MICRO: NORMAL /LPF
COLOR UR: YELLOW
EPI CELLS #/AREA URNS HPF: NORMAL /HPF (ref 0–10)
GLUCOSE UR QL STRIP: NEGATIVE
HGB UR QL STRIP: NEGATIVE
KETONES UR QL STRIP: NEGATIVE
LEUKOCYTE ESTERASE UR QL STRIP: NEGATIVE
MICRO URNS: NORMAL
MICRO URNS: NORMAL
NITRITE UR QL STRIP: NEGATIVE
PH UR STRIP: 6.5 [PH] (ref 5–7.5)
PROT UR QL STRIP: NEGATIVE
RBC #/AREA URNS HPF: NORMAL /HPF (ref 0–2)
SP GR UR STRIP: 1.01 (ref 1–1.03)
URINALYSIS REFLEX: NORMAL
UROBILINOGEN UR STRIP-MCNC: 0.2 MG/DL (ref 0.2–1)
WBC #/AREA URNS HPF: NORMAL /HPF (ref 0–5)

## 2024-12-16 ENCOUNTER — TELEPHONE (OUTPATIENT)
Age: 74
End: 2024-12-16

## 2024-12-16 NOTE — TELEPHONE ENCOUNTER
Re: [email or web address]    Rcvd PA in Epic, Key# [id number], Auth# [id number], effective 01/01/24-12/31/25.  Scanned to chart, sent fyi to nurse. Sent fax to pharmacy.

## 2024-12-28 DIAGNOSIS — M81.6 LOCALIZED OSTEOPOROSIS WITHOUT CURRENT PATHOLOGICAL FRACTURE: ICD-10-CM

## 2024-12-30 RX ORDER — ALENDRONATE SODIUM 70 MG/1
TABLET ORAL
Qty: 12 TABLET | Refills: 0 | Status: SHIPPED | OUTPATIENT
Start: 2024-12-30

## 2025-01-13 ENCOUNTER — TELEPHONE (OUTPATIENT)
Age: 75
End: 2025-01-13

## 2025-01-13 NOTE — TELEPHONE ENCOUNTER
zolpidem (AMBIEN) 5 MG tablet     Please clarify if patient was to stop taking medication due to past incident.     Phone: [phone]

## 2025-01-14 NOTE — TELEPHONE ENCOUNTER
Relayed paola's message per her she should stop Ambien.  SHE VERBALIZED UNDERSTANDING AND APPRECIATION.

## 2025-04-17 ENCOUNTER — TRANSCRIBE ORDERS (OUTPATIENT)
Dept: SCHEDULING | Age: 75
End: 2025-04-17

## 2025-04-17 DIAGNOSIS — M51.362 DEGENERATION OF INTERVERTEBRAL DISC OF LUMBAR REGION WITH DISCOGENIC BACK PAIN AND LOWER EXTREMITY PAIN: Primary | ICD-10-CM

## 2025-04-29 ENCOUNTER — OFFICE VISIT (OUTPATIENT)
Age: 75
End: 2025-04-29
Payer: MEDICARE

## 2025-04-29 VITALS
DIASTOLIC BLOOD PRESSURE: 86 MMHG | HEIGHT: 60 IN | HEART RATE: 76 BPM | OXYGEN SATURATION: 94 % | SYSTOLIC BLOOD PRESSURE: 162 MMHG | BODY MASS INDEX: 31.8 KG/M2 | WEIGHT: 162 LBS

## 2025-04-29 DIAGNOSIS — I70.90 ATHEROSCLEROSIS: ICD-10-CM

## 2025-04-29 DIAGNOSIS — E78.5 DYSLIPIDEMIA: ICD-10-CM

## 2025-04-29 DIAGNOSIS — I10 PRIMARY HYPERTENSION: Primary | ICD-10-CM

## 2025-04-29 PROCEDURE — 1126F AMNT PAIN NOTED NONE PRSNT: CPT | Performed by: SPECIALIST

## 2025-04-29 PROCEDURE — 93005 ELECTROCARDIOGRAM TRACING: CPT | Performed by: SPECIALIST

## 2025-04-29 PROCEDURE — 1123F ACP DISCUSS/DSCN MKR DOCD: CPT | Performed by: SPECIALIST

## 2025-04-29 PROCEDURE — 3077F SYST BP >= 140 MM HG: CPT | Performed by: SPECIALIST

## 2025-04-29 PROCEDURE — 3079F DIAST BP 80-89 MM HG: CPT | Performed by: SPECIALIST

## 2025-04-29 PROCEDURE — 93010 ELECTROCARDIOGRAM REPORT: CPT | Performed by: SPECIALIST

## 2025-04-29 PROCEDURE — 99214 OFFICE O/P EST MOD 30 MIN: CPT | Performed by: SPECIALIST

## 2025-04-29 PROCEDURE — 1159F MED LIST DOCD IN RCRD: CPT | Performed by: SPECIALIST

## 2025-04-29 RX ORDER — OLMESARTAN MEDOXOMIL 20 MG/1
20 TABLET ORAL DAILY
Qty: 90 TABLET | Refills: 3 | Status: SHIPPED | OUTPATIENT
Start: 2025-04-29

## 2025-04-29 NOTE — PATIENT INSTRUCTIONS
Patient Education        Learning About the Mediterranean Diet  What is the Mediterranean diet?     The Mediterranean diet is a style of eating rather than a diet plan. It features foods eaten in [place], [place], [place], and other countries along the [place] Sea. It emphasizes eating foods like fish, fruits, vegetables, beans, high-fiber breads and whole grains, nuts, and olive oil. This style of eating includes limited red meat, cheese, and sweets.  Why choose the Mediterranean diet?  A Mediterranean-style diet may improve heart health. It contains more fat than other heart-healthy diets. But the fats are mainly from nuts, unsaturated oils (such as fish oils and olive oil), and certain nut or seed oils (such as canola, soybean, or flaxseed oil). These fats may help protect the heart and blood vessels.  How can you get started on the Mediterranean diet?  Here are some things you can do to switch to a more Mediterranean way of eating.  What to eat  Eat a variety of fruits and vegetables each day, such as grapes, blueberries, tomatoes, broccoli, peppers, figs, olives, spinach, eggplant, beans, lentils, and chickpeas.  Eat a variety of whole-grain foods each day, such as oats, brown rice, and whole wheat bread, pasta, and couscous.  Eat fish at least 2 times a week. Try tuna, salmon, mackerel, lake trout, herring, or sardines.  Eat moderate amounts of low-fat dairy products, such as milk, cheese, or yogurt.  Eat moderate amounts of poultry and eggs.  Choose healthy (unsaturated) fats, such as nuts, olive oil, and certain nut or seed oils like canola, soybean, and flaxseed.  Limit unhealthy (saturated) fats, such as butter, palm oil, and coconut oil. And limit fats found in animal products, such as meat and dairy products made with whole milk. Try to eat red meat only a few times a month in very small amounts.  Limit sweets and desserts to only a few times a week. This includes sugar-sweetened

## 2025-04-29 NOTE — PROGRESS NOTES
Chief Complaint   Patient presents with    Hypertension    DLD    Atherosclerosis      Vitals:    04/29/25 1142 04/29/25 1155   BP: (!) 170/88 (!) 162/86   BP Site: Left Upper Arm Left Upper Arm   Patient Position: Sitting Sitting   Pulse: 76    SpO2: 94%    Weight: 73.5 kg (162 lb)    Height: 1.524 m (5')          Chest pain: DENIED     Recent hospital stays: DENIED     Refills: DENIED     Patient states that she got very upset and angry during a phone call yesterday. During this time patient states that she noticed numbing in the left side of her face. 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   Patient states that she feels good today.   
   Substance Abuse Mother     Migraines Mother     Cancer Brother         skin    Arthritis Brother     Coronary Art Dis Brother     Depression Brother     Gout Brother     Heart Disease Brother         h/o double bypass    High Blood Pressure Brother     High Cholesterol Brother     Osteoarthritis Brother     Alzheimer's Disease Brother     Migraines Brother     Breast Cancer Paternal Aunt           late 50s    Cancer Father         bladder    Alcohol Abuse Father     Arthritis Father     Coronary Art Dis Father     Depression Father         not treated    High Blood Pressure Father     Osteoarthritis Father     Substance Abuse Father     Cancer Maternal Uncle         leukemia    Colon Cancer Paternal Grandfather     Heart Attack Maternal Grandfather     Heart Disease Maternal Grandfather     Cancer Maternal Uncle         throat         REVIEW OF SYMPTOMS:     Review of Symptoms:  Constitutional: Negative for fever, chills  HEENT: Negative for nosebleeds, tinnitus, and vision changes.   Respiratory: Negative for cough, wheezing  Cardiovascular: Negative for orthopnea, claudication, syncope  Gastrointestinal: Negative for abdominal pain, diarrhea, melena.   Genitourinary: Negative for dysuria  Musculoskeletal: + back and joint pain   Skin: Negative for rash  Heme: No problems bleeding.  Neurological: Negative for speech change and focal weakness.       PHYSICAL EXAM:     Physical Exam:  BP (!) 162/86 (BP Site: Left Upper Arm, Patient Position: Sitting)   Pulse 76   Ht 1.524 m (5')   Wt 73.5 kg (162 lb)   SpO2 94%   BMI 31.64 kg/m²     Patient appears generally well, mood and affect are appropriate and pleasant.  HEENT:  Hearing intact, non-icteric, normocephalic, atraumatic.   Neck Exam: Supple, No JVD   Lung Exam: Clear to auscultation, even breath sounds.   Cardiac Exam: Regular rate and rhythm with no murmur or rub  Abdomen: Soft, non-tender  Extremities: Moves all ext well. No lower extremity

## 2025-04-30 ENCOUNTER — HOSPITAL ENCOUNTER (OUTPATIENT)
Facility: HOSPITAL | Age: 75
Discharge: HOME | End: 2025-05-03
Attending: PHYSICAL MEDICINE & REHABILITATION
Payer: MEDICARE

## 2025-04-30 DIAGNOSIS — M51.362 DEGENERATION OF INTERVERTEBRAL DISC OF LUMBAR REGION WITH DISCOGENIC BACK PAIN AND LOWER EXTREMITY PAIN: ICD-10-CM

## 2025-04-30 PROCEDURE — 72148 MRI LUMBAR SPINE W/O DYE: CPT

## 2025-05-03 DIAGNOSIS — I10 HYPERTENSION, UNSPECIFIED TYPE: ICD-10-CM

## 2025-05-05 RX ORDER — METOPROLOL TARTRATE 25 MG/1
25 TABLET, FILM COATED ORAL 2 TIMES DAILY
Qty: 180 TABLET | Refills: 0 | OUTPATIENT
Start: 2025-05-05

## 2025-05-06 RX ORDER — EZETIMIBE 10 MG/1
10 TABLET ORAL DAILY
Qty: 90 TABLET | Refills: 1 | Status: SHIPPED | OUTPATIENT
Start: 2025-05-06

## 2025-05-13 ENCOUNTER — TELEPHONE (OUTPATIENT)
Age: 75
End: 2025-05-13

## 2025-05-13 RX ORDER — EVOLOCUMAB 140 MG/ML
140 INJECTION, SOLUTION SUBCUTANEOUS
Qty: 6 ADJUSTABLE DOSE PRE-FILLED PEN SYRINGE | Refills: 3 | Status: SHIPPED | OUTPATIENT
Start: 2025-05-13

## 2025-05-13 NOTE — TELEPHONE ENCOUNTER
Patient asking if her insurance approved her repatha medication. Patient also would like a sample of repatha until her prescription is ready. Patient has been without repatha for 2 weeks.     Pharmacy # [phone]  Patient # [phone] okay to [facility]

## 2025-05-13 NOTE — TELEPHONE ENCOUNTER
Writer phoned patient and left a HIPAA approved message that medication was sent to pharmacy.    Refill per VO of Dr. Vincenzo Hill  Last appt: 4/29/2025    Future Appointments   Date Time Provider Department Center   5/16/2025  4:00 PM Rogelio Burton MD SPCPM [facility] ECC DEP   6/12/2025 10:30 AM Sonya Avalos MD [facility] AMB   7/10/2025 11:00 AM Nolvia Nascimento, APRN - NP [facility]       Requested Prescriptions     Signed Prescriptions Disp Refills    Evolocumab (REPATHA SURECLICK) SOAJ pen 6 Adjustable Dose Pre-filled Pen Syringe 3     Sig: Inject 1 mL into the skin every 14 days     Authorizing Provider: VINCENZO HILL     Ordering User: EDER CUENCA

## 2025-05-16 ENCOUNTER — OFFICE VISIT (OUTPATIENT)
Dept: PRIMARY CARE CLINIC | Facility: CLINIC | Age: 75
End: 2025-05-16
Payer: MEDICARE

## 2025-05-16 ENCOUNTER — TELEPHONE (OUTPATIENT)
Age: 75
End: 2025-05-16

## 2025-05-16 VITALS
HEART RATE: 76 BPM | WEIGHT: 163 LBS | BODY MASS INDEX: 32 KG/M2 | HEIGHT: 60 IN | TEMPERATURE: 98 F | DIASTOLIC BLOOD PRESSURE: 78 MMHG | SYSTOLIC BLOOD PRESSURE: 135 MMHG | OXYGEN SATURATION: 94 %

## 2025-05-16 DIAGNOSIS — I10 PRIMARY HYPERTENSION: Primary | ICD-10-CM

## 2025-05-16 DIAGNOSIS — M48.07 STENOSIS OF LUMBOSACRAL SPINE: ICD-10-CM

## 2025-05-16 DIAGNOSIS — I10 HYPERTENSION, UNSPECIFIED TYPE: ICD-10-CM

## 2025-05-16 DIAGNOSIS — G43.E09 CHRONIC MIGRAINE WITH AURA WITHOUT STATUS MIGRAINOSUS, NOT INTRACTABLE: ICD-10-CM

## 2025-05-16 DIAGNOSIS — E78.5 HYPERLIPIDEMIA, UNSPECIFIED HYPERLIPIDEMIA TYPE: ICD-10-CM

## 2025-05-16 DIAGNOSIS — N18.31 CHRONIC KIDNEY DISEASE, STAGE 3A (HCC): ICD-10-CM

## 2025-05-16 DIAGNOSIS — N95.2 ATROPHIC VAGINITIS: ICD-10-CM

## 2025-05-16 PROBLEM — N39.0 UTI (URINARY TRACT INFECTION): Status: RESOLVED | Noted: 2020-12-27 | Resolved: 2025-05-16

## 2025-05-16 PROBLEM — U07.1 COVID-19: Status: RESOLVED | Noted: 2020-12-31 | Resolved: 2025-05-16

## 2025-05-16 PROCEDURE — 1159F MED LIST DOCD IN RCRD: CPT | Performed by: FAMILY MEDICINE

## 2025-05-16 PROCEDURE — 1123F ACP DISCUSS/DSCN MKR DOCD: CPT | Performed by: FAMILY MEDICINE

## 2025-05-16 PROCEDURE — 1160F RVW MEDS BY RX/DR IN RCRD: CPT | Performed by: FAMILY MEDICINE

## 2025-05-16 PROCEDURE — 99204 OFFICE O/P NEW MOD 45 MIN: CPT | Performed by: FAMILY MEDICINE

## 2025-05-16 PROCEDURE — 3078F DIAST BP <80 MM HG: CPT | Performed by: FAMILY MEDICINE

## 2025-05-16 PROCEDURE — 3075F SYST BP GE 130 - 139MM HG: CPT | Performed by: FAMILY MEDICINE

## 2025-05-16 RX ORDER — DULOXETIN HYDROCHLORIDE 60 MG/1
60 CAPSULE, DELAYED RELEASE ORAL DAILY
Qty: 90 CAPSULE | Refills: 1 | Status: SHIPPED | OUTPATIENT
Start: 2025-05-16

## 2025-05-16 RX ORDER — CARISOPRODOL 350 MG/1
350 TABLET ORAL 3 TIMES DAILY PRN
Qty: 270 TABLET | Refills: 1 | Status: SHIPPED | OUTPATIENT
Start: 2025-05-16 | End: 2025-11-12

## 2025-05-16 RX ORDER — ESTRADIOL 0.1 MG/G
2 CREAM VAGINAL DAILY
Qty: 42.5 G | Refills: 1 | Status: SHIPPED | OUTPATIENT
Start: 2025-05-16

## 2025-05-16 RX ORDER — PREGABALIN 50 MG/1
50 CAPSULE ORAL 2 TIMES DAILY
Qty: 60 CAPSULE | Refills: 2 | Status: SHIPPED | OUTPATIENT
Start: 2025-05-16 | End: 2025-08-14

## 2025-05-16 RX ORDER — METOPROLOL SUCCINATE 50 MG/1
50 TABLET, EXTENDED RELEASE ORAL DAILY
Qty: 90 TABLET | Refills: 1 | Status: SHIPPED | OUTPATIENT
Start: 2025-05-16

## 2025-05-16 RX ORDER — SUMATRIPTAN SUCCINATE 100 MG/1
TABLET ORAL
Qty: 9 TABLET | Refills: 11 | Status: SHIPPED | OUTPATIENT
Start: 2025-05-16

## 2025-05-16 SDOH — ECONOMIC STABILITY: FOOD INSECURITY: WITHIN THE PAST 12 MONTHS, THE FOOD YOU BOUGHT JUST DIDN'T LAST AND YOU DIDN'T HAVE MONEY TO GET MORE.: NEVER TRUE

## 2025-05-16 SDOH — ECONOMIC STABILITY: FOOD INSECURITY: WITHIN THE PAST 12 MONTHS, YOU WORRIED THAT YOUR FOOD WOULD RUN OUT BEFORE YOU GOT MONEY TO BUY MORE.: NEVER TRUE

## 2025-05-16 SDOH — HEALTH STABILITY: PHYSICAL HEALTH: ON AVERAGE, HOW MANY MINUTES DO YOU ENGAGE IN EXERCISE AT THIS LEVEL?: 30 MIN

## 2025-05-16 SDOH — HEALTH STABILITY: PHYSICAL HEALTH: ON AVERAGE, HOW MANY DAYS PER WEEK DO YOU ENGAGE IN MODERATE TO STRENUOUS EXERCISE (LIKE A BRISK WALK)?: 1 DAY

## 2025-05-16 ASSESSMENT — PATIENT HEALTH QUESTIONNAIRE - PHQ9
2. FEELING DOWN, DEPRESSED OR HOPELESS: NOT AT ALL
SUM OF ALL RESPONSES TO PHQ QUESTIONS 1-9: 0
1. LITTLE INTEREST OR PLEASURE IN DOING THINGS: NOT AT ALL

## 2025-05-16 NOTE — TELEPHONE ENCOUNTER
Ann Marie from [facility] called to request more clinical information for prior auth for repatha.       [facility]  [phone]  Ref# [id number]

## 2025-05-16 NOTE — PROGRESS NOTES
Subjective  Chief Complaint   Patient presents with    New Patient     Establish/ medication refills     HPI:  Shreya Sandoval is a 75 y.o. female.    History of Present Illness  The patient presents for evaluation of back pain and medication management.    She has not had a primary care physician recently and is uncertain about her last consultation with a nurse practitioner, whether it was earlier this year or the previous one. She requires refills for all her medications. She has discontinued cyclobenzaprine and is seeking a prescription for Soma, which she finds more effective.     She has been diagnosed with hypertension, which is currently managed with olmesartan. Her blood pressure was recorded at 170 during her last visit to Dr. Hill. She has been informed that her blood pressure tends to elevate when she is excited or nervous, but it subsequently normalizes. However, it has been consistently higher than her usual levels.    She experienced a hip fracture on 09/11/2024, which was managed with a dolores insertion in her femur. She reports no current hip pain but has been experiencing increased back pain. She consulted Dr. Riggins for this issue, but he did not prescribe any medication and instead recommended a referral to [facility] beds at the [facility]. She has declined this referral due to a previous unsatisfactory experience with them following a spinal fusion surgery after a work-related injury. She has been managing her back pain without medication for the past 12 to 15 years, as her physicians were reluctant to prescribe tramadol. She has been self-medicating with tramadol provided by her . She has tried gabapentin, which was prescribed by her neurologist, but it did not alleviate her pain. She has never tried Lyrica. She has undergone extensive physical therapy without significant benefit. She used to walk a mile daily, but this has been limited due to her hip and back pain. She is requesting a

## 2025-06-20 ENCOUNTER — TELEPHONE (OUTPATIENT)
Dept: PRIMARY CARE CLINIC | Facility: CLINIC | Age: 75
End: 2025-06-20

## 2025-06-20 NOTE — TELEPHONE ENCOUNTER
Patient called stating she has an appt in July, but patient wants to be seen earlier \" only with dr. Burton\" for swelling of left ankle, patient only has one kidney and is concerned.       I tried to schedule patient with another provider but patient only wants to see her provider. I informed patient that I will send a TE back to the nurse. Call patient back at [phone]

## 2025-06-24 ENCOUNTER — OFFICE VISIT (OUTPATIENT)
Dept: PRIMARY CARE CLINIC | Facility: CLINIC | Age: 75
End: 2025-06-24
Payer: MEDICARE

## 2025-06-24 VITALS
RESPIRATION RATE: 16 BRPM | HEART RATE: 67 BPM | HEIGHT: 60 IN | SYSTOLIC BLOOD PRESSURE: 147 MMHG | OXYGEN SATURATION: 97 % | WEIGHT: 165.8 LBS | TEMPERATURE: 98 F | DIASTOLIC BLOOD PRESSURE: 93 MMHG | BODY MASS INDEX: 32.55 KG/M2

## 2025-06-24 DIAGNOSIS — N18.31 CHRONIC KIDNEY DISEASE, STAGE 3A (HCC): ICD-10-CM

## 2025-06-24 DIAGNOSIS — E78.5 HYPERLIPIDEMIA, UNSPECIFIED HYPERLIPIDEMIA TYPE: ICD-10-CM

## 2025-06-24 DIAGNOSIS — R73.03 PREDIABETES: ICD-10-CM

## 2025-06-24 DIAGNOSIS — M75.82 TENDINITIS OF LEFT ROTATOR CUFF: ICD-10-CM

## 2025-06-24 DIAGNOSIS — M48.07 STENOSIS OF LUMBOSACRAL SPINE: ICD-10-CM

## 2025-06-24 DIAGNOSIS — F51.01 PRIMARY INSOMNIA: Primary | ICD-10-CM

## 2025-06-24 PROCEDURE — 1123F ACP DISCUSS/DSCN MKR DOCD: CPT | Performed by: FAMILY MEDICINE

## 2025-06-24 PROCEDURE — 3077F SYST BP >= 140 MM HG: CPT | Performed by: FAMILY MEDICINE

## 2025-06-24 PROCEDURE — 3080F DIAST BP >= 90 MM HG: CPT | Performed by: FAMILY MEDICINE

## 2025-06-24 PROCEDURE — 1159F MED LIST DOCD IN RCRD: CPT | Performed by: FAMILY MEDICINE

## 2025-06-24 PROCEDURE — 99214 OFFICE O/P EST MOD 30 MIN: CPT | Performed by: FAMILY MEDICINE

## 2025-06-24 RX ORDER — ZOLPIDEM TARTRATE 10 MG/1
10 TABLET ORAL NIGHTLY PRN
Qty: 30 TABLET | Refills: 5 | Status: SHIPPED | OUTPATIENT
Start: 2025-06-24 | End: 2025-12-21

## 2025-06-24 NOTE — PROGRESS NOTES
Subjective  Chief Complaint   Patient presents with    Other     swelling of ankles/ back pain/ no sleep     HPI:  Shreya Sandoval is a 75 y.o. female.    History of Present Illness  The patient presents for evaluation of back pain, sleep disturbance, foot swelling, diabetes, osteoporosis, and rotator cuff injury.    She reports persistent back pain that has not been alleviated by her current medication, pregabalin. She has not experienced any lightheadedness with pregabalin and has not attempted to take 2 doses simultaneously. She expresses reluctance to increase the dosage of pregabalin due to a previous unsuccessful trial of gabapentin, which she took 3 times daily at a dose of 300 mg. She discontinued Lyrica 3 days ago and has noticed an improvement in her foot swelling since then.    She also reports sleep disturbances, often waking up at 4:00 AM and struggling to fall asleep until 11:00 PM or later, which she attributes to her back pain.  She was previously on Ambien 10 mg for several years, but it was discontinued following a fall. She clarifies that she was not taking Ambien at the time of the fall. She has previously taken lorazepam and temazepam, which were effective.    She has a history of osteoporosis and has been managing her condition with diet and supplements since discontinuing Fosamax. She consumes yogurt and Ensure daily and takes 2 calcium chews, although she notes that calcium causes her constipation. She has a history of kidney stones and is concerned about the potential for calcium supplementation to cause kidney stones.    She has a history of rotator cuff injury in her left arm, which she believes was exacerbated by a fall on 12/31/2024. She reports that her left arm is better than it was initially, but she still experiences pain. She also reports mild pain in her right arm, but it is not as severe as the left. She has been engaging in water exercises, which she finds beneficial, although they

## 2025-06-24 NOTE — ASSESSMENT & PLAN NOTE
Lyrica caused edema.  Will stop Lyrica.  Will try to see if controlling insomnia will help with overall pain control.  Continue other medications.

## 2025-07-09 ENCOUNTER — CLINICAL DOCUMENTATION (OUTPATIENT)
Age: 75
End: 2025-07-09

## 2025-07-09 NOTE — PROGRESS NOTES
Pt dropped off documentation:    \"1)  In my 30's was found to have cholesterol of 350+ and it was assumed to be familial.  2)  Lost 40+ pounds  3)  Started Lipitor and many other statins through the years.  Liver enzymes all elevated.  4)  Continued to maintain lower weight, but could never get LDL down; was around 160+.  5)  Placed on zetia and med for triglycerides in my 50's.  6)  LDL would only go down to 110 or so with STRICT dieting which I could not maintain.  7)  Dr. CLIFFORD Wells diagnosed me with heart disease and put me on Repatha.  Said I ABSOLUTELY would have a heart attack without it.  It worked great and I remain on it, LDL remains around 55 or 60.\"        Insurance PA not required; cost $109.87 per 28 days per pharmacy.

## 2025-07-23 ENCOUNTER — TRANSCRIBE ORDERS (OUTPATIENT)
Facility: HOSPITAL | Age: 75
End: 2025-07-23

## 2025-07-23 DIAGNOSIS — Z12.31 ENCOUNTER FOR SCREENING MAMMOGRAM FOR MALIGNANT NEOPLASM OF BREAST: Primary | ICD-10-CM

## 2025-08-08 ENCOUNTER — HOSPITAL ENCOUNTER (OUTPATIENT)
Facility: HOSPITAL | Age: 75
Discharge: HOME | End: 2025-08-11
Payer: MEDICARE

## 2025-08-08 VITALS — BODY MASS INDEX: 32.22 KG/M2 | WEIGHT: 165 LBS

## 2025-08-08 DIAGNOSIS — Z12.31 ENCOUNTER FOR SCREENING MAMMOGRAM FOR MALIGNANT NEOPLASM OF BREAST: ICD-10-CM

## 2025-08-08 PROCEDURE — 77067 SCR MAMMO BI INCL CAD: CPT

## 2025-08-15 ENCOUNTER — TELEPHONE (OUTPATIENT)
Dept: PRIMARY CARE CLINIC | Facility: CLINIC | Age: 75
End: 2025-08-15

## 2025-08-15 DIAGNOSIS — M48.07 STENOSIS OF LUMBOSACRAL SPINE: ICD-10-CM

## 2025-08-15 LAB
ALBUMIN SERPL-MCNC: 4.3 G/DL (ref 3.8–4.8)
ALP SERPL-CCNC: 84 IU/L (ref 44–121)
ALT SERPL-CCNC: 15 IU/L (ref 0–32)
AST SERPL-CCNC: 26 IU/L (ref 0–40)
BASOPHILS # BLD AUTO: 0.1 X10E3/UL (ref 0–0.2)
BASOPHILS NFR BLD AUTO: 1 %
BILIRUB SERPL-MCNC: 0.5 MG/DL (ref 0–1.2)
BUN SERPL-MCNC: 21 MG/DL (ref 8–27)
BUN/CREAT SERPL: 21 (ref 12–28)
CALCIUM SERPL-MCNC: 9.5 MG/DL (ref 8.7–10.3)
CHLORIDE SERPL-SCNC: 89 MMOL/L (ref 96–106)
CHOLEST SERPL-MCNC: 133 MG/DL (ref 100–199)
CO2 SERPL-SCNC: 21 MMOL/L (ref 20–29)
CREAT SERPL-MCNC: 1.01 MG/DL (ref 0.57–1)
EGFRCR SERPLBLD CKD-EPI 2021: 57 ML/MIN/1.73
EOSINOPHIL # BLD AUTO: 0.5 X10E3/UL (ref 0–0.4)
EOSINOPHIL NFR BLD AUTO: 3 %
ERYTHROCYTE [DISTWIDTH] IN BLOOD BY AUTOMATED COUNT: 12.5 % (ref 11.7–15.4)
GLOBULIN SER CALC-MCNC: 2.3 G/DL (ref 1.5–4.5)
GLUCOSE SERPL-MCNC: 78 MG/DL (ref 70–99)
HCT VFR BLD AUTO: 36.5 % (ref 34–46.6)
HDLC SERPL-MCNC: 60 MG/DL
HGB BLD-MCNC: 11.5 G/DL (ref 11.1–15.9)
IMM GRANULOCYTES # BLD AUTO: 0 X10E3/UL (ref 0–0.1)
IMM GRANULOCYTES NFR BLD AUTO: 0 %
LDLC SERPL CALC-MCNC: 50 MG/DL (ref 0–99)
LYMPHOCYTES # BLD AUTO: 9.5 X10E3/UL (ref 0.7–3.1)
LYMPHOCYTES NFR BLD AUTO: 59 %
MCH RBC QN AUTO: 29 PG (ref 26.6–33)
MCHC RBC AUTO-ENTMCNC: 31.5 G/DL (ref 31.5–35.7)
MCV RBC AUTO: 92 FL (ref 79–97)
MONOCYTES # BLD AUTO: 0.8 X10E3/UL (ref 0.1–0.9)
MONOCYTES NFR BLD AUTO: 5 %
NEUTROPHILS # BLD AUTO: 5 X10E3/UL (ref 1.4–7)
NEUTROPHILS NFR BLD AUTO: 32 %
PLATELET # BLD AUTO: 341 X10E3/UL (ref 150–450)
POTASSIUM SERPL-SCNC: 4.8 MMOL/L (ref 3.5–5.2)
PROT SERPL-MCNC: 6.6 G/DL (ref 6–8.5)
RBC # BLD AUTO: 3.96 X10E6/UL (ref 3.77–5.28)
SODIUM SERPL-SCNC: 127 MMOL/L (ref 134–144)
TRIGL SERPL-MCNC: 133 MG/DL (ref 0–149)
VLDLC SERPL CALC-MCNC: 23 MG/DL (ref 5–40)
WBC # BLD AUTO: 16 X10E3/UL (ref 3.4–10.8)

## 2025-08-15 RX ORDER — CARISOPRODOL 350 MG/1
350 TABLET ORAL 3 TIMES DAILY PRN
Qty: 270 TABLET | Refills: 1 | Status: SHIPPED | OUTPATIENT
Start: 2025-08-15 | End: 2025-08-17 | Stop reason: SDUPTHER

## 2025-08-16 ENCOUNTER — TELEPHONE (OUTPATIENT)
Dept: PRIMARY CARE CLINIC | Facility: CLINIC | Age: 75
End: 2025-08-16

## 2025-08-16 DIAGNOSIS — M48.07 STENOSIS OF LUMBOSACRAL SPINE: Primary | ICD-10-CM

## 2025-08-17 LAB
EST. AVERAGE GLUCOSE BLD GHB EST-MCNC: 114 MG/DL
HBA1C MFR BLD: 5.6 %HB

## 2025-08-17 RX ORDER — CARISOPRODOL 350 MG/1
350 TABLET ORAL 3 TIMES DAILY PRN
Qty: 270 TABLET | Refills: 0 | Status: SHIPPED | OUTPATIENT
Start: 2025-08-17 | End: 2025-11-15

## 2025-08-29 ENCOUNTER — OFFICE VISIT (OUTPATIENT)
Dept: PRIMARY CARE CLINIC | Facility: CLINIC | Age: 75
End: 2025-08-29

## 2025-08-29 VITALS
SYSTOLIC BLOOD PRESSURE: 112 MMHG | HEART RATE: 75 BPM | HEIGHT: 60 IN | OXYGEN SATURATION: 94 % | WEIGHT: 170 LBS | BODY MASS INDEX: 33.38 KG/M2 | RESPIRATION RATE: 16 BRPM | TEMPERATURE: 98.3 F | DIASTOLIC BLOOD PRESSURE: 70 MMHG

## 2025-08-29 DIAGNOSIS — F51.01 PRIMARY INSOMNIA: ICD-10-CM

## 2025-08-29 DIAGNOSIS — R05.1 ACUTE COUGH: ICD-10-CM

## 2025-08-29 DIAGNOSIS — D72.829 LEUKOCYTOSIS, UNSPECIFIED TYPE: ICD-10-CM

## 2025-08-29 DIAGNOSIS — Z00.00 MEDICARE ANNUAL WELLNESS VISIT, SUBSEQUENT: Primary | ICD-10-CM

## 2025-08-29 RX ORDER — PREDNISONE 20 MG/1
20 TABLET ORAL 2 TIMES DAILY
Qty: 10 TABLET | Refills: 0 | Status: SHIPPED | OUTPATIENT
Start: 2025-08-29 | End: 2025-09-03

## 2025-08-29 ASSESSMENT — LIFESTYLE VARIABLES
HOW OFTEN DO YOU HAVE A DRINK CONTAINING ALCOHOL: NEVER
HOW MANY STANDARD DRINKS CONTAINING ALCOHOL DO YOU HAVE ON A TYPICAL DAY: PATIENT DOES NOT DRINK

## 2025-08-29 ASSESSMENT — PATIENT HEALTH QUESTIONNAIRE - PHQ9
2. FEELING DOWN, DEPRESSED OR HOPELESS: SEVERAL DAYS
SUM OF ALL RESPONSES TO PHQ QUESTIONS 1-9: 2
1. LITTLE INTEREST OR PLEASURE IN DOING THINGS: SEVERAL DAYS

## (undated) DEVICE — ENDO CARRY-ON PROCEDURE KIT INCLUDES ENZYMATIC SPONGE, GAUZE, BIOHAZARD LABEL, TRAY, LUBRICANT, DIRTY SCOPE LABEL, WATER LABEL, TRAY, DRAWSTRING PAD, AND DEFENDO 4-PIECE KIT.: Brand: ENDO CARRY-ON PROCEDURE KIT

## (undated) DEVICE — CANN NASAL O2 CAPNOGRAPHY AD -- FILTERLINE

## (undated) DEVICE — SYR 50ML SLIP TIP NSAF LF STRL --

## (undated) DEVICE — KIT IV STRT W CHLORAPREP PD 1ML

## (undated) DEVICE — 1200 GUARD II KIT W/5MM TUBE W/O VAC TUBE: Brand: GUARDIAN

## (undated) DEVICE — SOLIDIFIER FLUID 3000 CC ABSORB

## (undated) DEVICE — WRISTBAND ID AD W1XL11.5IN RED POLY ALRG PREPRINTED PERM

## (undated) DEVICE — Z DISCONTINUED USE 2751540 TUBING IRRIG L10IN DISP PMP ENDOGATOR

## (undated) DEVICE — KENDALL RADIOLUCENT FOAM MONITORING ELECTRODE -RECTANGULAR SHAPE: Brand: KENDALL

## (undated) DEVICE — AIRLIFE™ U/CONNECT-IT OXYGEN TUBING 7 FEET (2.1 M) CRUSH-RESISTANT OXYGEN TUBING, VINYL TIPPED: Brand: AIRLIFE™

## (undated) DEVICE — CONNECTOR TBNG AUX H2O JET DISP FOR OLY 160/180 SER

## (undated) DEVICE — SYRINGE MED 20ML STD CLR PLAS LUERLOCK TIP N CTRL DISP

## (undated) DEVICE — Z DISCONTINUED NO SUB IDED SET EXTN W/ 4 W STPCOCK M SPIN LOK 36IN

## (undated) DEVICE — NEEDLE HYPO 18GA L1.5IN PNK S STL HUB POLYPR SHLD REG BVL

## (undated) DEVICE — CATH IV AUTOGRD BC BLU 22GA 25 -- INSYTE

## (undated) DEVICE — BW-412T DISP COMBO CLEANING BRUSH: Brand: SINGLE USE COMBINATION CLEANING BRUSH

## (undated) DEVICE — NEONATAL-ADULT SPO2 SENSOR: Brand: NELLCOR

## (undated) DEVICE — SET ADMIN 16ML TBNG L100IN 2 Y INJ SITE IV PIGGY BK DISP

## (undated) DEVICE — BAG BELONG PT PERS CLEAR HANDL

## (undated) DEVICE — QUILTED PREMIUM COMFORT UNDERPAD,EXTRA HEAVY: Brand: WINGS